# Patient Record
Sex: FEMALE | Race: WHITE | NOT HISPANIC OR LATINO | Employment: FULL TIME | URBAN - METROPOLITAN AREA
[De-identification: names, ages, dates, MRNs, and addresses within clinical notes are randomized per-mention and may not be internally consistent; named-entity substitution may affect disease eponyms.]

---

## 2017-01-04 ENCOUNTER — ROUTINE PRENATAL (OUTPATIENT)
Dept: OBGYN | Facility: CLINIC | Age: 32
End: 2017-01-04
Payer: COMMERCIAL

## 2017-01-04 VITALS — SYSTOLIC BLOOD PRESSURE: 106 MMHG | DIASTOLIC BLOOD PRESSURE: 66 MMHG | BODY MASS INDEX: 19.9 KG/M2 | WEIGHT: 116 LBS

## 2017-01-04 DIAGNOSIS — Z34.82 PRENATAL CARE, SUBSEQUENT PREGNANCY, SECOND TRIMESTER: ICD-10-CM

## 2017-01-04 PROCEDURE — 90040 PR PRENATAL FOLLOW UP: CPT | Performed by: OBSTETRICS & GYNECOLOGY

## 2017-01-04 NOTE — MR AVS SNAPSHOT
Rhea Baum   2017 3:45 PM   Routine Prenatal   MRN: [id number]    Department:  [facility]   Dept Phone:  [phone]    Description:  Female : 1985   Provider:  Joelle Stallings M.D.           Allergies as of 2017     No Known Allergies      You were diagnosed with     Prenatal care, subsequent pregnancy, second trimester   [658707]         Vital Signs     Blood Pressure Weight Last Menstrual Period Smoking Status          106/66 mmHg 52.617 kg (116 lb) 2016 Former Smoker        Basic Information     Date Of Birth Sex Race Ethnicity Preferred Language    1985 Female White Non- English      Your appointments     2017 10:30 AM   OB Follow Up with Joelle Stallings M.D.   [facility] ([address])    [address], [address]  [place] [address] [phone]            Mar 23, 2017  8:00 AM   Follow Up Visit with Neeraj Greer M.D.   [facility] (--)    [address], [address]  [place] [address] [phone]           You will be receiving a confirmation call a few days before your appointment from our automated call confirmation system.            May 30, 2017  3:40 PM   FOLLOW UP with Olaf Umana M.D.   [facility] (--)    [address]  [place] [address] [phone]              Problem List              ICD-10-CM Priority Class Noted - Resolved    Multiple sclerosis affecting pregnancy (HCC) O99.350, G35   2016 - Present    PSVT (paroxysmal supraventricular tachycardia) (HCC) I47.1   2016 - Present    Constipation due to neurogenic bowel K59.00   2016 - Present      Health Maintenance        Date Due Completion Dates    IMM DTaP/Tdap/Td Vaccine (1 - Tdap) 2005 ---    PAP SMEAR 2019            Current Immunizations     Influenza Vaccine Quad Inj (Pf) 10/6/2016  7:53 AM      Below and/or attached are the  medications your provider expects you to take. Review all of your home medications and newly ordered medications with your provider and/or pharmacist. Follow medication instructions as directed by your provider and/or pharmacist. Please keep your medication list with you and share with your provider. Update the information when medications are discontinued, doses are changed, or new medications (including over-the-counter products) are added; and carry medication information at all times in the event of emergency situations     Allergies:  No Known Allergies          Medications  Valid as of: January 04, 2017 -  3:47 PM    Generic Name Brand Name Tablet Size Instructions for use    Corn Dextrin   Take  by mouth.        Polyethylene Glycol 3350   Take  by mouth.        Prenatal Vit-Fe Fumarate-FA   Take  by mouth.        Probiotic Product   Take  by mouth.        .                 Medicines prescribed today were sent to:     [facility]/PHARMACY #[id number] - [place], [place] - [address] AT [address] [address]    Phone: [phone] Fax: [phone]    Open 24 Hours?: No      Medication refill instructions:       If your prescription bottle indicates you have medication refills left, it is not necessary to call your provider’s office. Please contact your pharmacy and they will refill your medication.    If your prescription bottle indicates you do not have any refills left, you may request refills at any time through one of the following ways: The online [employer] system (except Urgent Care), by calling your provider’s office, or by asking your pharmacy to contact your provider’s office with a refill request. Medication refills are processed only during regular business hours and may not be available until the next business day. Your provider may request additional information or to have a follow-up visit with you prior to refilling your medication.   *Please Note: Medication refills  are assigned a new Rx number when refilled electronically. Your pharmacy may indicate that no refills were authorized even though a new prescription for the same medication is available at the pharmacy. Please request the medicine by name with the pharmacy before contacting your provider for a refill.        Your To Do List     Future Labs/Procedures Complete By Expires    GLUCOSE 1HR GESTATIONAL  As directed 1/4/2018    HCT  As directed 1/5/2018    HGB  As directed 1/5/2018    T.PALLIDUM AB EIA  As directed 1/5/2018         MyChart Access Code: Activation code not generated  Current [employer] Status: Active

## 2017-01-05 NOTE — PROGRESS NOTES
31 y.o.  24w0d The patient is here for routine obstetrical followup. She is without complaints and reports good fetal movement. She denies contractions, vaginal bleeding, or leaking of fluid.  MS is stable, no flares.  Constipation doing well on Miralax.  Seen by cardiology and holter showed sinus tach, no PSVT, no meds required.     The patient's pregnancy is complicated by   Patient Active Problem List    Diagnosis Date Noted   • Multiple sclerosis affecting pregnancy ([facility]) 2016   • PSVT (paroxysmal supraventricular tachycardia) ([facility]) 2016   • Constipation due to neurogenic bowel 2016     Has f/u with Dr. Lu scheduled in March.    28 wk labs ordered.     Followup in 4 weeks   labor precautions were discussed with patient  Fetal kick counts were discussed with patient

## 2017-01-09 ENCOUNTER — TELEPHONE (OUTPATIENT)
Dept: OBGYN | Facility: CLINIC | Age: 32
End: 2017-01-09

## 2017-01-09 NOTE — TELEPHONE ENCOUNTER
Called pt back   C/o a cold   Advised pt that she can take tylenol, dimetapp, mucinex, robitussin   Pt verbalized understanding

## 2017-01-21 ENCOUNTER — HOSPITAL ENCOUNTER (OUTPATIENT)
Dept: LAB | Facility: MEDICAL CENTER | Age: 32
End: 2017-01-21
Attending: OBSTETRICS & GYNECOLOGY
Payer: COMMERCIAL

## 2017-01-21 DIAGNOSIS — Z34.82 PRENATAL CARE, SUBSEQUENT PREGNANCY, SECOND TRIMESTER: ICD-10-CM

## 2017-01-21 LAB
GLUCOSE 1H P 50 G GLC PO SERPL-MCNC: 135 MG/DL (ref 70–139)
HCT VFR BLD AUTO: 31.3 % (ref 37–47)
HGB BLD-MCNC: 10.1 G/DL (ref 12–16)
TREPONEMA PALLIDUM IGG+IGM AB [PRESENCE] IN SERUM OR PLASMA BY IMMUNOASSAY: NON REACTIVE

## 2017-01-21 PROCEDURE — 36415 COLL VENOUS BLD VENIPUNCTURE: CPT

## 2017-01-21 PROCEDURE — 85018 HEMOGLOBIN: CPT

## 2017-01-21 PROCEDURE — 86780 TREPONEMA PALLIDUM: CPT

## 2017-01-21 PROCEDURE — 85014 HEMATOCRIT: CPT

## 2017-01-21 PROCEDURE — 82950 GLUCOSE TEST: CPT

## 2017-02-06 ENCOUNTER — ROUTINE PRENATAL (OUTPATIENT)
Dept: OBGYN | Facility: CLINIC | Age: 32
End: 2017-02-06
Payer: COMMERCIAL

## 2017-02-06 VITALS — BODY MASS INDEX: 20.42 KG/M2 | DIASTOLIC BLOOD PRESSURE: 60 MMHG | WEIGHT: 119 LBS | SYSTOLIC BLOOD PRESSURE: 90 MMHG

## 2017-02-06 DIAGNOSIS — O99.350 MULTIPLE SCLEROSIS AFFECTING PREGNANCY (HCC): ICD-10-CM

## 2017-02-06 DIAGNOSIS — G35 MULTIPLE SCLEROSIS AFFECTING PREGNANCY (HCC): ICD-10-CM

## 2017-02-06 DIAGNOSIS — K59.00 CONSTIPATION DUE TO NEUROGENIC BOWEL: ICD-10-CM

## 2017-02-06 DIAGNOSIS — I47.10 PSVT (PAROXYSMAL SUPRAVENTRICULAR TACHYCARDIA): ICD-10-CM

## 2017-02-06 DIAGNOSIS — K59.2 CONSTIPATION DUE TO NEUROGENIC BOWEL: ICD-10-CM

## 2017-02-06 PROCEDURE — 90040 PR PRENATAL FOLLOW UP: CPT | Performed by: OBSTETRICS & GYNECOLOGY

## 2017-02-06 NOTE — Clinical Note
"Count Your Baby's Movements  Another step to a healthy delivery              Dept: [phone]    How Many Weeks Pregnant? 28    Date to Begin Countin17              How to use this chart    One way for your physician to keep track of your baby's health is by knowing how often the baby moves (or \"kicks\") in your womb.  You can help your physician to do this by using this chart every day.    Every day, you should see how many hours it takes for your baby to move 10 times.  Start in the morning, as soon as you get up.    · First, write down the time your baby moves until you get to 10.  · Check off one box every time your baby moves until you get to 10.  · Write down the time you finished counting in the last column.  · Total how long it took to count up all 10 movements.  · Finally, fill in the box that shows how long this took.  After counting 10 movements, you no longer have to count any more that day.  The next morning, just start counting again as soon as you get up.    What should you call a \"movement\"?  It is hard to say, because it will feel different from one mother to another and from one pregnancy to the next.  The important thing is that you count the movements the same way throughout your pregnancy.  If you have more questions, you should ask your physician.    Count carefully every day!  SAMPLE:  Week 28    How many hours did it take to feel 10 movements?       Start  Time     1     2     3     4     5     6     7     8     9     10   Finish Time   Mon 8:20 ·  ·  ·  ·  ·  ·  ·  ·  ·  ·  11:40                  Sat               Sun                 IMPORTANT: You should contact your physician if it takes more than two hours for you to feel 10 movements.  Each morning, write down the time and start to count the movements of your baby.  Keep track by checking off one box every time you feel one movement.  When you have felt 10 \"kicks\", write " down the time you finished counting in the last column.  Then fill in the   box (over the check aura) for the number of hours it took.  Be sure to read the complete instructions on the previous page.

## 2017-02-06 NOTE — MR AVS SNAPSHOT
Rhea Bautista   2017 3:45 PM   Routine Prenatal   MRN: [id number]    [facility]  [facility]   Dept Phone:  [phone]    Description:  Female : 1985   Provider:  Joelle Stallings M.D.           Allergies as of 2017     No Known Allergies      Vital Signs     Blood Pressure Weight Last Menstrual Period Smoking Status          90/60 mmHg 53.978 kg (119 lb) 2016 Former Smoker        Basic Information     Date Of Birth Sex Race Ethnicity Preferred Language    1985 Female White Non- English      Your appointments     Mar 09, 2017  3:00 PM   OB Follow Up with Joelle Stallings M.D.   [facility] ([address])    [address]  [place] [address] [phone]            Mar 23, 2017  8:00 AM   Follow Up Visit with Neeraj Greer M.D.   [facility] Neu[facility] (--)    [address], [address]  [place] [address] [phone]           You will be receiving a confirmation call a few days before your appointment from our automated call confirmation system.            May 30, 2017  3:40 PM   FOLLOW UP with Olaf Umana M.D.   [facility] (--)    [address]  [place] [address] [phone]              Problem List              ICD-10-CM Priority Class Noted - Resolved    Multiple sclerosis affecting pregnancy (CMS-HCC) O99.350, G35   2016 - Present    PSVT (paroxysmal supraventricular tachycardia) (CMS-HCC) I47.1   2016 - Present    Constipation due to neurogenic bowel K59.00   2016 - Present      Health Maintenance        Date Due Completion Dates    IMM DTaP/Tdap/Td Vaccine (1 - Tdap) 2005 ---    PAP SMEAR 2019            Current Immunizations     Influenza Vaccine Quad Inj (Pf) 10/6/2016  7:53 AM      Below and/or attached are the medications your provider expects you to take. Review all of your home medications and newly ordered  medications with your provider and/or pharmacist. Follow medication instructions as directed by your provider and/or pharmacist. Please keep your medication list with you and share with your provider. Update the information when medications are discontinued, doses are changed, or new medications (including over-the-counter products) are added; and carry medication information at all times in the event of emergency situations     Allergies:  No Known Allergies          Medications  Valid as of: February 06, 2017 -  4:28 PM    Generic Name Brand Name Tablet Size Instructions for use    Corn Dextrin   Take  by mouth.        Polyethylene Glycol 3350   Take  by mouth.        Prenatal Vit-Fe Fumarate-FA   Take  by mouth.        Probiotic Product   Take  by mouth.        .                 Medicines prescribed today were sent to:     [facility]/PHARMACY #[id number] - [place], [place] - [address] AT [address] [address]    Phone: [phone] Fax: [phone]    Open 24 Hours?: No      Medication refill instructions:       If your prescription bottle indicates you have medication refills left, it is not necessary to call your provider’s office. Please contact your pharmacy and they will refill your medication.    If your prescription bottle indicates you do not have any refills left, you may request refills at any time through one of the following ways: The online [employer] system (except Urgent Care), by calling your provider’s office, or by asking your pharmacy to contact your provider’s office with a refill request. Medication refills are processed only during regular business hours and may not be available until the next business day. Your provider may request additional information or to have a follow-up visit with you prior to refilling your medication.   *Please Note: Medication refills are assigned a new Rx number when refilled electronically. Your pharmacy may indicate that no refills  were authorized even though a new prescription for the same medication is available at the pharmacy. Please request the medicine by name with the pharmacy before contacting your provider for a refill.           MyChart Access Code: Activation code not generated  Current [employer] Status: Active

## 2017-02-07 NOTE — PROGRESS NOTES
31 y.o.  28w5d The patient is here for routine obstetrical followup. She is without complaints and reports good fetal movement. She denies contractions, vaginal bleeding, or leaking of fluid.  MS stable.  Constipation well controlled. Minimal tachycardia symptoms.     The patient's pregnancy is complicated by   Patient Active Problem List    Diagnosis Date Noted   • Multiple sclerosis affecting pregnancy (CMS-HCC) 2016   • PSVT (paroxysmal supraventricular tachycardia) (CMS-HCC) 2016   • Constipation due to neurogenic bowel 2016     S<<D.  Will call Dr. Lu's office to move up growth scan (scheduled 3/7).  Minimal weight gain - reviewed diet and iron intake.   28 wk labs reviewed - normal.          Followup in 2 weeks   labor precautions were discussed with patient  Fetal kick counts were discussed with patient

## 2017-02-07 NOTE — PROGRESS NOTES
Pt here for OB F/V. Good fetal movement. Denies LOF, VB.  28 week labs done  Info given on tdap  Kick count sheet given

## 2017-03-13 ENCOUNTER — APPOINTMENT (OUTPATIENT)
Dept: OTHER | Facility: IMAGING CENTER | Age: 32
End: 2017-03-13

## 2017-03-23 ENCOUNTER — HOSPITAL ENCOUNTER (OUTPATIENT)
Facility: MEDICAL CENTER | Age: 32
End: 2017-03-23
Attending: OBSTETRICS & GYNECOLOGY
Payer: COMMERCIAL

## 2017-03-23 ENCOUNTER — ROUTINE PRENATAL (OUTPATIENT)
Dept: OBGYN | Facility: CLINIC | Age: 32
End: 2017-03-23
Payer: COMMERCIAL

## 2017-03-23 ENCOUNTER — APPOINTMENT (OUTPATIENT)
Dept: RADIOLOGY | Facility: MEDICAL CENTER | Age: 32
End: 2017-03-23
Attending: OBSTETRICS & GYNECOLOGY
Payer: COMMERCIAL

## 2017-03-23 ENCOUNTER — HOSPITAL ENCOUNTER (OUTPATIENT)
Facility: MEDICAL CENTER | Age: 32
End: 2017-03-23
Attending: OBSTETRICS & GYNECOLOGY | Admitting: OBSTETRICS & GYNECOLOGY
Payer: COMMERCIAL

## 2017-03-23 VITALS — DIASTOLIC BLOOD PRESSURE: 66 MMHG | WEIGHT: 121 LBS | BODY MASS INDEX: 20.76 KG/M2 | SYSTOLIC BLOOD PRESSURE: 98 MMHG

## 2017-03-23 VITALS
RESPIRATION RATE: 16 BRPM | TEMPERATURE: 98 F | DIASTOLIC BLOOD PRESSURE: 69 MMHG | SYSTOLIC BLOOD PRESSURE: 112 MMHG | BODY MASS INDEX: 20.76 KG/M2 | WEIGHT: 121 LBS | HEART RATE: 71 BPM

## 2017-03-23 DIAGNOSIS — G35 MULTIPLE SCLEROSIS AFFECTING PREGNANCY (HCC): ICD-10-CM

## 2017-03-23 DIAGNOSIS — O26.843 UTERINE SIZE DATE DISCREPANCY PREGNANCY, THIRD TRIMESTER: ICD-10-CM

## 2017-03-23 DIAGNOSIS — O99.350 MULTIPLE SCLEROSIS AFFECTING PREGNANCY (HCC): ICD-10-CM

## 2017-03-23 LAB
NST ACOUSTIC STIMULATION: ABNORMAL
NST ACTION NECESSARY: ABNORMAL
NST ASSESSMENT: ABNORMAL
NST BASELINE: ABNORMAL
NST INDICATIONS: ABNORMAL
NST OTHER DATA: ABNORMAL
NST READ BY: ABNORMAL
NST RETURN: ABNORMAL
NST UTERINE ACTIVITY: ABNORMAL

## 2017-03-23 PROCEDURE — 59025 FETAL NON-STRESS TEST: CPT | Performed by: OBSTETRICS & GYNECOLOGY

## 2017-03-23 PROCEDURE — 87653 STREP B DNA AMP PROBE: CPT

## 2017-03-23 PROCEDURE — 76805 OB US >/= 14 WKS SNGL FETUS: CPT

## 2017-03-23 PROCEDURE — 76819 FETAL BIOPHYS PROFIL W/O NST: CPT

## 2017-03-23 PROCEDURE — 90040 PR PRENATAL FOLLOW UP: CPT | Performed by: OBSTETRICS & GYNECOLOGY

## 2017-03-23 NOTE — MR AVS SNAPSHOT
Rhea Baum   3/23/2017 4:00 PM   Routine Prenatal   MRN: [id number]    [facility]  [facility]   Dept Phone:  [phone]    Description:  Female : 1985   Provider:  ASHLIE CABRALES ROOM           Allergies as of 3/23/2017     No Known Allergies      You were diagnosed with     Uterine size date discrepancy pregnancy, third trimester   [694163]         Vital Signs     Last Menstrual Period Smoking Status                2016 Former Smoker          Basic Information     Date Of Birth Sex Race Ethnicity Preferred Language    1985 Female White Non- English      Your appointments     Mar 29, 2017 10:00 AM   OB Follow Up with Joelle Stallings M.D.   [facility] ([address])    [address] [place] [address], [address]  [facility] [address] [phone]            2017  9:00 AM   OB Follow Up with Joelle Stallings M.D.   [facility] ([address])    [address]  [place] [address] [phone]            2017  9:00 AM   OB Follow Up with Joelle Stallings M.D.   [facility] ([address])    [address]  [place] [address] [phone]            2017  9:00 AM   OB Follow Up with Joelle Stallings M.D.   [facility] ([address])    [address] [place] [address], [address]  [place] [address] [phone]            May 30, 2017  3:40 PM   FOLLOW UP with Olaf Umana M.D.   [facility] (--)    [address]  [place] [address] [phone]              Problem List              ICD-10-CM Priority Class Noted - Resolved    Multiple sclerosis affecting pregnancy (CMS-HCC) O99.350, G35   2016 - Present    PSVT (paroxysmal supraventricular tachycardia) (CMS-HCC) I47.1   2016 - Present    Constipation due to neurogenic bowel K59.00   2016 - Present    SGA (small for gestational age) P05.00    3/23/2017 - Present      Health Maintenance        Date Due Completion Dates    IMM DTaP/Tdap/Td Vaccine (1 - Tdap) 11/17/2005 ---    PAP SMEAR 11/4/2019 11/4/2016            Results       Current Immunizations     Influenza Vaccine Quad Inj (Pf) 10/6/2016  7:53 AM      Below and/or attached are the medications your provider expects you to take. Review all of your home medications and newly ordered medications with your provider and/or pharmacist. Follow medication instructions as directed by your provider and/or pharmacist. Please keep your medication list with you and share with your provider. Update the information when medications are discontinued, doses are changed, or new medications (including over-the-counter products) are added; and carry medication information at all times in the event of emergency situations     Allergies:  No Known Allergies          Medications  Valid as of: March 23, 2017 -  4:39 PM    Generic Name Brand Name Tablet Size Instructions for use    Corn Dextrin   Take  by mouth.        Polyethylene Glycol 3350   Take  by mouth.        Prenatal Vit-Fe Fumarate-FA   Take  by mouth.        Probiotic Product   Take  by mouth.        .                 Medicines prescribed today were sent to:     [facility]/PHARMACY #[id number] - [place], [place] - [address] [place] [address] AT [address] [address]    Phone: [phone] Fax: [phone]    Open 24 Hours?: No      Medication refill instructions:       If your prescription bottle indicates you have medication refills left, it is not necessary to call your provider’s office. Please contact your pharmacy and they will refill your medication.    If your prescription bottle indicates you do not have any refills left, you may request refills at any time through one of the following ways: The online [employer] system (except Urgent Care), by calling your provider’s office, or by asking your pharmacy to contact your provider’s office with  a refill request. Medication refills are processed only during regular business hours and may not be available until the next business day. Your provider may request additional information or to have a follow-up visit with you prior to refilling your medication.   *Please Note: Medication refills are assigned a new Rx number when refilled electronically. Your pharmacy may indicate that no refills were authorized even though a new prescription for the same medication is available at the pharmacy. Please request the medicine by name with the pharmacy before contacting your provider for a refill.           [employer] Access Code: Activation code not generated  Current [employer] Status: Active

## 2017-03-23 NOTE — PROGRESS NOTES
31 y.o.  35w1d The patient is here for routine obstetrical followup. She reports good fetal movement. She denies contractions, vaginal bleeding, or leaking of fluid.  She missed several appointments due to not having a ride.  She reports intermittent abdominal pains, which is usual for her MS.  No constipation.  No cardiac symptoms.    The patient's pregnancy is complicated by   Patient Active Problem List    Diagnosis Date Noted   • Multiple sclerosis affecting pregnancy (CMS-HCC) 2016   • PSVT (paroxysmal supraventricular tachycardia) (CMS-HCC) 2016   • Constipation due to neurogenic bowel 2016      Weight gain only 12 lbs (2 lbs in last 7 wks).      Limited US performed for PAULIE.  PAULIE = 11.1 cm  NST today.   Has F/U US scheduled with Dr. Lu in 2 weeks.  GBS done.    NST today  [place] - intermittent contractions  EFM - 120s, moderate variability, not reactive despite VAS.    To L&D for BPP, growth scan, and dopplers.

## 2017-03-23 NOTE — MR AVS SNAPSHOT
Rhea Baum   3/23/2017 3:30 PM   Routine Prenatal   MRN: [id number]    [facility]  [facility]   Dept Phone:  [phone]    Description:  Female : 1985   Provider:  Joelle Stallings M.D.           Allergies as of 3/23/2017     No Known Allergies      You were diagnosed with     Multiple sclerosis affecting pregnancy (CMS-HCC)   [3818756]       SGA (small for gestational age)   [670308]         Vital Signs     Blood Pressure Weight Last Menstrual Period Smoking Status          98/66 mmHg 54.885 kg (121 lb) 2016 Former Smoker        Basic Information     Date Of Birth Sex Race Ethnicity Preferred Language    1985 Female White Non- English      Your appointments     Mar 29, 2017 10:00 AM   OB Follow Up with Joelle Stallings M.D.   [facility] ([address])    [address] [place] [address], [facility] [address]  [facility] [address] [phone]            2017  9:00 AM   OB Follow Up with Joelle Stallings M.D.   [facility] ([address])    [address] [place] [address], [facility] [address]  [facility] [address] [phone]            2017  9:00 AM   OB Follow Up with Joelle Stallings M.D.   [facility] ([address])    [address] [place] [address], [address]  [facility] [address]   [phone]            2017  9:00 AM   OB Follow Up with Joelle Stallings M.D.   [facility] ([address])    [address] [place] [address], [address]  [facility] [address]   [phone]            May 30, 2017  3:40 PM   FOLLOW UP with Olaf Umana M.D.   [facility] (--)    [address]  [place] [address] [phone]              Problem List              ICD-10-CM Priority Class Noted - Resolved    Multiple sclerosis affecting pregnancy (CMS-HCC) O99.350, G35   2016 - Present    PSVT (paroxysmal supraventricular tachycardia) (CMS-HCC) I47.1   2016 - Present    Constipation due to  neurogenic bowel K59.00   12/1/2016 - Present    SGA (small for gestational age) P05.00   3/23/2017 - Present      Health Maintenance        Date Due Completion Dates    IMM DTaP/Tdap/Td Vaccine (1 - Tdap) 11/17/2005 ---    PAP SMEAR 11/4/2019 11/4/2016            Current Immunizations     Influenza Vaccine Quad Inj (Pf) 10/6/2016  7:53 AM      Below and/or attached are the medications your provider expects you to take. Review all of your home medications and newly ordered medications with your provider and/or pharmacist. Follow medication instructions as directed by your provider and/or pharmacist. Please keep your medication list with you and share with your provider. Update the information when medications are discontinued, doses are changed, or new medications (including over-the-counter products) are added; and carry medication information at all times in the event of emergency situations     Allergies:  No Known Allergies          Medications  Valid as of: March 23, 2017 -  4:40 PM    Generic Name Brand Name Tablet Size Instructions for use    Corn Dextrin   Take  by mouth.        Polyethylene Glycol 3350   Take  by mouth.        Prenatal Vit-Fe Fumarate-FA   Take  by mouth.        Probiotic Product   Take  by mouth.        .                 Medicines prescribed today were sent to:     [facility]/PHARMACY #[id number] - [place], [place] - [address] [place] [address]    Phone: [phone] Fax: [phone]    Open 24 Hours?: No      Medication refill instructions:       If your prescription bottle indicates you have medication refills left, it is not necessary to call your provider’s office. Please contact your pharmacy and they will refill your medication.    If your prescription bottle indicates you do not have any refills left, you may request refills at any time through one of the following ways: The online [employer] system (except Urgent Care), by calling your  provider’s office, or by asking your pharmacy to contact your provider’s office with a refill request. Medication refills are processed only during regular business hours and may not be available until the next business day. Your provider may request additional information or to have a follow-up visit with you prior to refilling your medication.   *Please Note: Medication refills are assigned a new Rx number when refilled electronically. Your pharmacy may indicate that no refills were authorized even though a new prescription for the same medication is available at the pharmacy. Please request the medicine by name with the pharmacy before contacting your provider for a refill.        Your To Do List     Future Labs/Procedures Complete By Expires    GRP B STREP, BY PCR (MEYER BROTH)  As directed 3/24/2018         [employer] Access Code: Activation code not generated  Current [employer] Status: Active

## 2017-03-24 NOTE — PROGRESS NOTES
1850 Report received from Jovanna CORONA, POC discussed, will updated Dr. Jesus on pt status ASAP.    1918 Dr. Ruiz updated on pt status, test results reviewed. Orders to discharge pt home with instructions to follow up with Dr. Stallings at [facility] for biweekly NST.     1930 Pt discharged home in stable condition, all questions answered, pt verbalized understanding and will follow up with Dr. Stallings for NST.

## 2017-03-24 NOTE — PROGRESS NOTES
EDC- , EGA- 35.1    1715- Pt arrived to L&D from Dr. Stallings's office for non-reactive tracing.  Mak for BPP, PAULIE, and dopplers received.  Pt denies LOF, VB, UC's, reports +FM.  1745- Ultrasound at bs.  1850- Report to CHLOE Salcido.

## 2017-03-25 LAB — GP B STREP DNA SPEC QL NAA+PROBE: NEGATIVE

## 2017-03-29 ENCOUNTER — ROUTINE PRENATAL (OUTPATIENT)
Dept: OBGYN | Facility: CLINIC | Age: 32
End: 2017-03-29
Payer: COMMERCIAL

## 2017-03-29 VITALS — SYSTOLIC BLOOD PRESSURE: 100 MMHG | BODY MASS INDEX: 20.76 KG/M2 | DIASTOLIC BLOOD PRESSURE: 64 MMHG | WEIGHT: 121 LBS

## 2017-03-29 DIAGNOSIS — G35 MULTIPLE SCLEROSIS AFFECTING PREGNANCY (HCC): ICD-10-CM

## 2017-03-29 DIAGNOSIS — O26.843 UTERINE SIZE DATE DISCREPANCY PREGNANCY, THIRD TRIMESTER: ICD-10-CM

## 2017-03-29 DIAGNOSIS — O99.350 MULTIPLE SCLEROSIS AFFECTING PREGNANCY (HCC): ICD-10-CM

## 2017-03-29 LAB
NST ACOUSTIC STIMULATION: NORMAL
NST ACTION NECESSARY: NORMAL
NST ASSESSMENT: NORMAL
NST BASELINE: NORMAL
NST INDICATIONS: NORMAL
NST OTHER DATA: NORMAL
NST READ BY: NORMAL
NST RETURN: NORMAL
NST UTERINE ACTIVITY: NORMAL

## 2017-03-29 PROCEDURE — 59025 FETAL NON-STRESS TEST: CPT | Performed by: OBSTETRICS & GYNECOLOGY

## 2017-03-29 PROCEDURE — 90040 PR PRENATAL FOLLOW UP: CPT | Performed by: OBSTETRICS & GYNECOLOGY

## 2017-03-29 NOTE — MR AVS SNAPSHOT
Rhea Baum   3/29/2017 10:30 AM   Routine Prenatal   MRN: [id number]    Department:  [facility]   Dept Phone:  [phone]    Description:  Female : 1985   Provider:  ASHLIE VANG NST ROOM           Allergies as of 3/29/2017     No Known Allergies      You were diagnosed with     Uterine size date discrepancy pregnancy, third trimester   [497921]         Vital Signs     Last Menstrual Period Smoking Status                2016 Former Smoker          Basic Information     Date Of Birth Sex Race Ethnicity Preferred Language    1985 Female White Non- English      Your appointments     2017  8:45 AM   Fetal Non-Stress Test with ASHLIE VANG NST ROOM   [facility] ([address])    [address] [place] [address], [address]  [place] [address] [phone]            2017  9:00 AM   OB Follow Up with Joelle Stallings M.D.   [facility] ([address])    [address], [address]  [place] [address] [phone]            2017  9:00 AM   OB Follow Up with Joelle Stallings M.D.   [facility] ([address])    [address], [address]  [place] [address] [phone]            2017  9:00 AM   OB Follow Up with Joelle Stallings M.D.   [facility] ([address])    [address] [place] [address], [address]  [place] [address] [phone]            May 30, 2017  3:40 PM   FOLLOW UP with Olaf Umana M.D.   [facility] (--)    [address]  [place] [address] [phone]              Problem List              ICD-10-CM Priority Class Noted - Resolved    Multiple sclerosis affecting pregnancy (CMS-HCC) O99.350, G35   2016 - Present    PSVT (paroxysmal supraventricular tachycardia) (CMS-HCC) I47.1   2016 - Present    Constipation due to neurogenic bowel K59.00   2016 - Present    SGA (small for gestational age)  P05.00   3/23/2017 - Present      Health Maintenance        Date Due Completion Dates    IMM DTaP/Tdap/Td Vaccine (1 - Tdap) 11/17/2005 ---    PAP SMEAR 11/4/2019 11/4/2016            Results       Current Immunizations     Influenza Vaccine Quad Inj (Pf) 10/6/2016  7:53 AM      Below and/or attached are the medications your provider expects you to take. Review all of your home medications and newly ordered medications with your provider and/or pharmacist. Follow medication instructions as directed by your provider and/or pharmacist. Please keep your medication list with you and share with your provider. Update the information when medications are discontinued, doses are changed, or new medications (including over-the-counter products) are added; and carry medication information at all times in the event of emergency situations     Allergies:  No Known Allergies          Medications  Valid as of: March 29, 2017 - 11:15 AM    Generic Name Brand Name Tablet Size Instructions for use    Corn Dextrin   Take  by mouth.        Polyethylene Glycol 3350   Take  by mouth.        Prenatal Vit-Fe Fumarate-FA   Take  by mouth.        Probiotic Product   Take  by mouth.        .                 Medicines prescribed today were sent to:     [facility]/PHARMACY #[id number] - [place], [place] - [address] [address]    Phone: [phone] Fax: [phone]    Open 24 Hours?: No      Medication refill instructions:       If your prescription bottle indicates you have medication refills left, it is not necessary to call your provider’s office. Please contact your pharmacy and they will refill your medication.    If your prescription bottle indicates you do not have any refills left, you may request refills at any time through one of the following ways: The online [employer] system (except Urgent Care), by calling your provider’s office, or by asking your pharmacy to contact your provider’s  office with a refill request. Medication refills are processed only during regular business hours and may not be available until the next business day. Your provider may request additional information or to have a follow-up visit with you prior to refilling your medication.   *Please Note: Medication refills are assigned a new Rx number when refilled electronically. Your pharmacy may indicate that no refills were authorized even though a new prescription for the same medication is available at the pharmacy. Please request the medicine by name with the pharmacy before contacting your provider for a refill.           [employer] Access Code: Activation code not generated  Current [employer] Status: Active

## 2017-03-29 NOTE — PROGRESS NOTES
31 y.o.  36w0d The patient is here for routine obstetrical followup. She is without complaints and reports good fetal movement. She denies contractions, vaginal bleeding, or leaking of fluid.  No constipation. MS stable.  No cardiac symptoms.     The patient's pregnancy is complicated by   Patient Active Problem List    Diagnosis Date Noted   • SGA (small for gestational age) 2017   • Multiple sclerosis affecting pregnancy (CMS-HCC) 2016   • PSVT (paroxysmal supraventricular tachycardia) (CMS-HCC) 2016   • Constipation due to neurogenic bowel 2016     Seen on L&D for US growth, BPP, dopplers.  Size 19 days less than SHANNAN. Fluid, dopplers, BPP reassuring.    NST reactive today.  Continue with twice weekly NSTs.   Pt states she has f/u with Dr. Lu on .  Requested appointment be moved earlier, as I am concerned about the growth.  Will place order for IOL at or near 39 wks.     Followup in 1 week   labor precautions were discussed with patient  Fetal kick counts were discussed with patient

## 2017-03-29 NOTE — MR AVS SNAPSHOT
Rhea Baum   3/29/2017 10:00 AM   Routine Prenatal   MRN: [id number]    [facility]   Dept Phone:  [phone]    Description:  Female : 1985   Provider:  Joelle Stallings M.D.           Allergies as of 3/29/2017     No Known Allergies      You were diagnosed with     Multiple sclerosis affecting pregnancy (CMS-HCC)   [0470976]         Vital Signs     Blood Pressure Weight Last Menstrual Period Smoking Status          100/64 mmHg 54.885 kg (121 lb) 2016 Former Smoker        Basic Information     Date Of Birth Sex Race Ethnicity Preferred Language    1985 Female White Non- English      Your appointments     2017  8:45 AM   Fetal Non-Stress Test with OBGYN E2 NST ROOM   [facility] ([address])    [address] [place] [address], [facility] [address]  [facility] [address] [phone]            2017  9:00 AM   OB Follow Up with Joelle Stallings M.D.   [facility] ([address])    [address] [place] [address], [address]  [facility] [address] [phone]            2017  9:00 AM   OB Follow Up with Joelle Stallings M.D.   [facility] ([address])    [address] [place] [address], [address]  [facility] [address] [phone]            2017  9:00 AM   OB Follow Up with Joelle Stallings M.D.   [facility] ([address])    [address] [place] [address], [address]  [facility] [address] [phone]            May 30, 2017  3:40 PM   FOLLOW UP with Olaf Umana M.D.   [facility] (--)    [address] [place] [address] [address]  [place] [address] [phone]              Problem List              ICD-10-CM Priority Class Noted - Resolved    Multiple sclerosis affecting pregnancy (CMS-HCC) O99.350, G35   2016 - Present    PSVT (paroxysmal supraventricular tachycardia) (CMS-[facility]) I47.1   2016 - Present    Constipation due to neurogenic bowel K59.00   2016 -  Present    SGA (small for gestational age) P05.00   3/23/2017 - Present      Health Maintenance        Date Due Completion Dates    IMM DTaP/Tdap/Td Vaccine (1 - Tdap) 11/17/2005 ---    PAP SMEAR 11/4/2019 11/4/2016            Current Immunizations     Influenza Vaccine Quad Inj (Pf) 10/6/2016  7:53 AM      Below and/or attached are the medications your provider expects you to take. Review all of your home medications and newly ordered medications with your provider and/or pharmacist. Follow medication instructions as directed by your provider and/or pharmacist. Please keep your medication list with you and share with your provider. Update the information when medications are discontinued, doses are changed, or new medications (including over-the-counter products) are added; and carry medication information at all times in the event of emergency situations     Allergies:  No Known Allergies          Medications  Valid as of: March 29, 2017 - 11:18 AM    Generic Name Brand Name Tablet Size Instructions for use    Corn Dextrin   Take  by mouth.        Polyethylene Glycol 3350   Take  by mouth.        Prenatal Vit-Fe Fumarate-FA   Take  by mouth.        Probiotic Product   Take  by mouth.        .                 Medicines prescribed today were sent to:     [facility]/PHARMACY #[id number] [place], [place] - [address] [place] [address] AT [address] [address]    Phone: [phone] Fax: [phone]    Open 24 Hours?: No      Medication refill instructions:       If your prescription bottle indicates you have medication refills left, it is not necessary to call your provider’s office. Please contact your pharmacy and they will refill your medication.    If your prescription bottle indicates you do not have any refills left, you may request refills at any time through one of the following ways: The online [employer] system (except Urgent Care), by calling your provider’s office, or by asking your pharmacy  to contact your provider’s office with a refill request. Medication refills are processed only during regular business hours and may not be available until the next business day. Your provider may request additional information or to have a follow-up visit with you prior to refilling your medication.   *Please Note: Medication refills are assigned a new Rx number when refilled electronically. Your pharmacy may indicate that no refills were authorized even though a new prescription for the same medication is available at the pharmacy. Please request the medicine by name with the pharmacy before contacting your provider for a refill.           [employer] Access Code: Activation code not generated  Current [employer] Status: Active

## 2017-04-04 ENCOUNTER — ROUTINE PRENATAL (OUTPATIENT)
Dept: OBGYN | Facility: CLINIC | Age: 32
End: 2017-04-04
Payer: COMMERCIAL

## 2017-04-04 DIAGNOSIS — O28.8 NST (NON-STRESS TEST) NONREACTIVE: ICD-10-CM

## 2017-04-04 DIAGNOSIS — O26.843 UTERINE SIZE DATE DISCREPANCY PREGNANCY, THIRD TRIMESTER: ICD-10-CM

## 2017-04-04 LAB
NST ACOUSTIC STIMULATION: YES
NST ACTION NECESSARY: ABNORMAL
NST ASSESSMENT: NONREACTIVE
NST BASELINE: 150
NST INDICATIONS: ABNORMAL
NST OTHER DATA: ABNORMAL
NST READ BY: ABNORMAL
NST RETURN: ABNORMAL
NST UTERINE ACTIVITY: ABNORMAL

## 2017-04-04 PROCEDURE — 90040 PR PRENATAL FOLLOW UP: CPT | Performed by: OBSTETRICS & GYNECOLOGY

## 2017-04-04 PROCEDURE — 76815 OB US LIMITED FETUS(S): CPT | Performed by: OBSTETRICS & GYNECOLOGY

## 2017-04-04 NOTE — MR AVS SNAPSHOT
Rhea Baum   2017 8:45 AM   Routine Prenatal   MRN: [id number]    Department:  [facility]   Dept Phone:  [phone]    Description:  Female : 1985   Provider:  ASHLIE VANG NST ROOM           Allergies as of 2017     No Known Allergies      You were diagnosed with     Uterine size date discrepancy pregnancy, third trimester   [769239]         Vital Signs     Last Menstrual Period Smoking Status                2016 Former Smoker          Basic Information     Date Of Birth Sex Race Ethnicity Preferred Language    1985 Female White Non- English      Your appointments     2017  8:45 AM   Fetal Non-Stress Test with ASHLIE VANG NST ROOM   [facility] ([address])    [address] [facility], [address]  [facility] [address] [phone]            2017  9:00 AM   OB Follow Up with Joelle Stallings M.D.   [facility] ([address])    [address] [facility], [address]  [facility] [address] [phone]            2017  9:00 AM   OB Follow Up with Joelle Stallings M.D.   [facility] ([address])    [address] [facility], [address]  [facility] [address] [phone]            2017  8:00 AM   TPC INDUCTION OF LABOR with NON-SURGICAL L&D   LABOR & DELIVERY [facility] (--)    [address] [facility] [address] [phone]            2017  9:00 AM   OB Follow Up with Joelle Stallings M.D.   [facility] ([address])    [address] [facility], [address]  [facility] [address] [phone]            May 30, 2017  3:40 PM   FOLLOW UP with Olaf Umana M.D.   [facility] (--)    [address], [facility] [address]  [facility] [address] [phone]              Problem List              ICD-10-CM Priority Class Noted - Resolved    Multiple sclerosis affecting pregnancy (CMS-HCC) O99.350, G35   2016 - Present    PSVT (paroxysmal  supraventricular tachycardia) (CMS-[facility]) I47.1   12/1/2016 - Present    Constipation due to neurogenic bowel K59.00   12/1/2016 - Present    SGA (small for gestational age) P05.00   3/23/2017 - Present      Health Maintenance        Date Due Completion Dates    IMM DTaP/Tdap/Td Vaccine (1 - Tdap) 11/17/2005 ---    PAP SMEAR 11/4/2019 11/4/2016            Results       Current Immunizations     Influenza Vaccine Quad Inj (Pf) 10/6/2016  7:53 AM      Below and/or attached are the medications your provider expects you to take. Review all of your home medications and newly ordered medications with your provider and/or pharmacist. Follow medication instructions as directed by your provider and/or pharmacist. Please keep your medication list with you and share with your provider. Update the information when medications are discontinued, doses are changed, or new medications (including over-the-counter products) are added; and carry medication information at all times in the event of emergency situations     Allergies:  No Known Allergies          Medications  Valid as of: April 04, 2017 -  9:46 AM    Generic Name Brand Name Tablet Size Instructions for use    Corn Dextrin   Take  by mouth.        Polyethylene Glycol 3350   Take  by mouth.        Prenatal Vit-Fe Fumarate-FA   Take  by mouth.        Probiotic Product   Take  by mouth.        .                 Medicines prescribed today were sent to:     [facility]/PHARMACY #[id number]  [place] - [address] [place] [address]    Phone: [phone] Fax: [phone]    Open 24 Hours?: No      Medication refill instructions:       If your prescription bottle indicates you have medication refills left, it is not necessary to call your provider’s office. Please contact your pharmacy and they will refill your medication.    If your prescription bottle indicates you do not have any refills left, you may request refills at any time  through one of the following ways: The online [employer] system (except Urgent Care), by calling your provider’s office, or by asking your pharmacy to contact your provider’s office with a refill request. Medication refills are processed only during regular business hours and may not be available until the next business day. Your provider may request additional information or to have a follow-up visit with you prior to refilling your medication.   *Please Note: Medication refills are assigned a new Rx number when refilled electronically. Your pharmacy may indicate that no refills were authorized even though a new prescription for the same medication is available at the pharmacy. Please request the medicine by name with the pharmacy before contacting your provider for a refill.           [employer] Access Code: Activation code not generated  Current [employer] Status: Active

## 2017-04-04 NOTE — PROGRESS NOTES
NST nonreactive but no decelerations    Limited OB ultrasound-as performed and read by myself; biophysical profile 8/8 positive for gross fetal movements, positive for fine motor movements, positive for adequate PAULIE, positive for fetal breathing movements, cephalic presentation, grade 2 placenta, PAULIE 12.3

## 2017-04-07 ENCOUNTER — ROUTINE PRENATAL (OUTPATIENT)
Dept: OBGYN | Facility: CLINIC | Age: 32
End: 2017-04-07
Payer: COMMERCIAL

## 2017-04-07 DIAGNOSIS — O36.5930 IUGR (INTRAUTERINE GROWTH RESTRICTION) AFFECTING CARE OF MOTHER, THIRD TRIMESTER, NOT APPLICABLE OR UNSPECIFIED FETUS: ICD-10-CM

## 2017-04-11 ENCOUNTER — ROUTINE PRENATAL (OUTPATIENT)
Dept: OBGYN | Facility: CLINIC | Age: 32
End: 2017-04-11
Payer: COMMERCIAL

## 2017-04-11 VITALS — WEIGHT: 122 LBS | BODY MASS INDEX: 20.93 KG/M2 | SYSTOLIC BLOOD PRESSURE: 90 MMHG | DIASTOLIC BLOOD PRESSURE: 62 MMHG

## 2017-04-11 DIAGNOSIS — G35 MULTIPLE SCLEROSIS AFFECTING PREGNANCY (HCC): ICD-10-CM

## 2017-04-11 DIAGNOSIS — I47.10 PSVT (PAROXYSMAL SUPRAVENTRICULAR TACHYCARDIA): ICD-10-CM

## 2017-04-11 DIAGNOSIS — O99.350 MULTIPLE SCLEROSIS AFFECTING PREGNANCY (HCC): ICD-10-CM

## 2017-04-11 DIAGNOSIS — O36.5990 IUGR, ANTENATAL: ICD-10-CM

## 2017-04-11 DIAGNOSIS — K59.00 CONSTIPATION DUE TO NEUROGENIC BOWEL: ICD-10-CM

## 2017-04-11 DIAGNOSIS — K59.2 CONSTIPATION DUE TO NEUROGENIC BOWEL: ICD-10-CM

## 2017-04-11 LAB
NST ACOUSTIC STIMULATION: NORMAL
NST ACTION NECESSARY: NORMAL
NST ASSESSMENT: NORMAL
NST BASELINE: 130
NST INDICATIONS: NORMAL
NST OTHER DATA: NORMAL
NST READ BY: NORMAL
NST RETURN: NORMAL
NST UTERINE ACTIVITY: NORMAL

## 2017-04-11 PROCEDURE — 90040 PR PRENATAL FOLLOW UP: CPT | Performed by: OBSTETRICS & GYNECOLOGY

## 2017-04-11 NOTE — MR AVS SNAPSHOT
Rhea Baum   2017 9:00 AM   Routine Prenatal   MRN: [id number]    [facility]   Dept Phone:  [phone]    Description:  Female : 1985   Provider:  Joelle Stallings M.D.           Allergies as of 2017     No Known Allergies      You were diagnosed with     Multiple sclerosis affecting pregnancy (CMS-HCC)   [5962226]         Vital Signs     Blood Pressure Weight Last Menstrual Period Smoking Status          90/62 mmHg 55.339 kg (122 lb) 2016 Former Smoker        Basic Information     Date Of Birth Sex Race Ethnicity Preferred Language    1985 Female White Non- English      Your appointments     2017  9:00 AM   OB Follow Up with Joelle Stallings M.D.   [facility] ([address])    [address] [place] [address], [facility] [address]  [facility] [address] [phone]            2017  8:00 AM   TPC INDUCTION OF LABOR with NON-SURGICAL L&D   LABOR & DELIVERY [facility] (--)    [address] [facility] [address]   [phone]            2017  9:00 AM   OB Follow Up with Joelle Stallings M.D.   [facility] ([address])    [address] [place] [address], [facility] [address]  [facility] [address] [phone]            May 30, 2017  3:40 PM   FOLLOW UP with Olaf Umana M.D.   [facility] (--)    [address] [facility], [facility] [address]  [facility] [address] [phone]              Problem List              ICD-10-CM Priority Class Noted - Resolved    Multiple sclerosis affecting pregnancy (CMS-HCC) O99.350, G35   2016 - Present    PSVT (paroxysmal supraventricular tachycardia) (CMS-HCC) I47.1   2016 - Present    Constipation due to neurogenic bowel K59.00   2016 - Present    SGA (small for gestational age) P05.00   3/23/2017 - Present      Health Maintenance        Date Due Completion Dates    IMM DTaP/Tdap/Td Vaccine (1 - Tdap) 2005 ---    PAP SMEAR 2019            Current  Immunizations     Influenza Vaccine Quad Inj (Pf) 10/6/2016  7:53 AM      Below and/or attached are the medications your provider expects you to take. Review all of your home medications and newly ordered medications with your provider and/or pharmacist. Follow medication instructions as directed by your provider and/or pharmacist. Please keep your medication list with you and share with your provider. Update the information when medications are discontinued, doses are changed, or new medications (including over-the-counter products) are added; and carry medication information at all times in the event of emergency situations     Allergies:  No Known Allergies          Medications  Valid as of: April 11, 2017 - 10:03 AM    Generic Name Brand Name Tablet Size Instructions for use    Corn Dextrin   Take  by mouth.        Polyethylene Glycol 3350   Take  by mouth.        Prenatal Vit-Fe Fumarate-FA   Take  by mouth.        Probiotic Product   Take  by mouth.        .                 Medicines prescribed today were sent to:     [facility]/PHARMACY #[id number] - [place], [place] - [address] [facility] AT [address] [address]    Phone: [phone] Fax: [phone]    Open 24 Hours?: No      Medication refill instructions:       If your prescription bottle indicates you have medication refills left, it is not necessary to call your provider’s office. Please contact your pharmacy and they will refill your medication.    If your prescription bottle indicates you do not have any refills left, you may request refills at any time through one of the following ways: The online [employer] system (except Urgent Care), by calling your provider’s office, or by asking your pharmacy to contact your provider’s office with a refill request. Medication refills are processed only during regular business hours and may not be available until the next business day. Your provider may request additional information or to  have a follow-up visit with you prior to refilling your medication.   *Please Note: Medication refills are assigned a new Rx number when refilled electronically. Your pharmacy may indicate that no refills were authorized even though a new prescription for the same medication is available at the pharmacy. Please request the medicine by name with the pharmacy before contacting your provider for a refill.           [employer] Access Code: Activation code not generated  Current [employer] Status: Active

## 2017-04-11 NOTE — PROGRESS NOTES
31 y.o.  37w6d The patient is here for routine obstetrical followup. She reports good fetal movement. She denies contractions, vaginal bleeding, or leaking of fluid.  She is having trouble eating.  Only 1 lb weight gain last week.      The patient's pregnancy is complicated by   Patient Active Problem List    Diagnosis Date Noted   • SGA (small for gestational age) 2017   • Multiple sclerosis affecting pregnancy (CMS-HCC) 2016   • PSVT (paroxysmal supraventricular tachycardia) (CMS-HCC) 2016   • Constipation due to neurogenic bowel 2016     Seen by Dr. Lu, who reports EFW at 2537 grams, 17% tile with slowing of fetal growth.  She recommends IOL after 38 wks. I had originally scheduled Rhea for IOL at 39+1 when I am on next.  Offered keeping date with twice weekly NSTs or moving IOL earlier.  She would like to proceed tomorrow.  We discussed the risks of prolonged labor, increased risk of infection, failed IOL with subsequent C/S.  She understands the risks and desires to proceed.     NST cat 1 today.      Followup tomorrow for IOL at 0800  Labor precautions were discussed with patient  Fetal kick counts were discussed with patient

## 2017-04-11 NOTE — PROGRESS NOTES
Pt here for OB F/V. Good fetal movement. Denies LOF, VB.  Aly 4/3 in media  C sec 4/20/17 8 am  GBS NEG

## 2017-04-12 ENCOUNTER — HOSPITAL ENCOUNTER (INPATIENT)
Facility: MEDICAL CENTER | Age: 32
LOS: 2 days | End: 2017-04-14
Attending: OBSTETRICS & GYNECOLOGY | Admitting: OBSTETRICS & GYNECOLOGY
Payer: COMMERCIAL

## 2017-04-12 ENCOUNTER — APPOINTMENT (OUTPATIENT)
Dept: OBGYN | Facility: MEDICAL CENTER | Age: 32
End: 2017-04-12
Attending: OBSTETRICS & GYNECOLOGY
Payer: COMMERCIAL

## 2017-04-12 PROBLEM — O36.5990 IUGR (INTRAUTERINE GROWTH RESTRICTION) AFFECTING CARE OF MOTHER: Status: ACTIVE | Noted: 2017-04-12

## 2017-04-12 LAB
BASOPHILS # BLD AUTO: 0.4 % (ref 0–1.8)
BASOPHILS # BLD: 0.04 K/UL (ref 0–0.12)
EOSINOPHIL # BLD AUTO: 0.12 K/UL (ref 0–0.51)
EOSINOPHIL NFR BLD: 1.2 % (ref 0–6.9)
ERYTHROCYTE [DISTWIDTH] IN BLOOD BY AUTOMATED COUNT: 43.7 FL (ref 35.9–50)
HCT VFR BLD AUTO: 33.3 % (ref 37–47)
HGB BLD-MCNC: 11.4 G/DL (ref 12–16)
HOLDING TUBE BB 8507: NORMAL
IMM GRANULOCYTES # BLD AUTO: 0.13 K/UL (ref 0–0.11)
IMM GRANULOCYTES NFR BLD AUTO: 1.3 % (ref 0–0.9)
LYMPHOCYTES # BLD AUTO: 1.59 K/UL (ref 1–4.8)
LYMPHOCYTES NFR BLD: 15.6 % (ref 22–41)
MCH RBC QN AUTO: 32.3 PG (ref 27–33)
MCHC RBC AUTO-ENTMCNC: 34.2 G/DL (ref 33.6–35)
MCV RBC AUTO: 94.3 FL (ref 81.4–97.8)
MONOCYTES # BLD AUTO: 0.51 K/UL (ref 0–0.85)
MONOCYTES NFR BLD AUTO: 5 % (ref 0–13.4)
NEUTROPHILS # BLD AUTO: 7.78 K/UL (ref 2–7.15)
NEUTROPHILS NFR BLD: 76.5 % (ref 44–72)
NRBC # BLD AUTO: 0 K/UL
NRBC BLD AUTO-RTO: 0 /100 WBC
PLATELET # BLD AUTO: 182 K/UL (ref 164–446)
PMV BLD AUTO: 11.5 FL (ref 9–12.9)
RBC # BLD AUTO: 3.53 M/UL (ref 4.2–5.4)
WBC # BLD AUTO: 10.2 K/UL (ref 4.8–10.8)

## 2017-04-12 PROCEDURE — 4A1H74Z MONITORING OF PRODUCTS OF CONCEPTION, CARDIAC ELECTRICAL ACTIVITY, VIA NATURAL OR ARTIFICIAL OPENING: ICD-10-PCS | Performed by: OBSTETRICS & GYNECOLOGY

## 2017-04-12 PROCEDURE — 700101 HCHG RX REV CODE 250: Performed by: OBSTETRICS & GYNECOLOGY

## 2017-04-12 PROCEDURE — 700101 HCHG RX REV CODE 250

## 2017-04-12 PROCEDURE — 10907ZC DRAINAGE OF AMNIOTIC FLUID, THERAPEUTIC FROM PRODUCTS OF CONCEPTION, VIA NATURAL OR ARTIFICIAL OPENING: ICD-10-PCS | Performed by: OBSTETRICS & GYNECOLOGY

## 2017-04-12 PROCEDURE — 700111 HCHG RX REV CODE 636 W/ 250 OVERRIDE (IP): Performed by: OBSTETRICS & GYNECOLOGY

## 2017-04-12 PROCEDURE — 85025 COMPLETE CBC W/AUTO DIFF WBC: CPT

## 2017-04-12 PROCEDURE — 700111 HCHG RX REV CODE 636 W/ 250 OVERRIDE (IP)

## 2017-04-12 PROCEDURE — 770002 HCHG ROOM/CARE - OB PRIVATE (112)

## 2017-04-12 PROCEDURE — 3E033VJ INTRODUCTION OF OTHER HORMONE INTO PERIPHERAL VEIN, PERCUTANEOUS APPROACH: ICD-10-PCS | Performed by: OBSTETRICS & GYNECOLOGY

## 2017-04-12 PROCEDURE — 10H07YZ INSERTION OF OTHER DEVICE INTO PRODUCTS OF CONCEPTION, VIA NATURAL OR ARTIFICIAL OPENING: ICD-10-PCS | Performed by: OBSTETRICS & GYNECOLOGY

## 2017-04-12 RX ORDER — ONDANSETRON 4 MG/1
4 TABLET, ORALLY DISINTEGRATING ORAL EVERY 6 HOURS PRN
Status: DISCONTINUED | OUTPATIENT
Start: 2017-04-12 | End: 2017-04-13

## 2017-04-12 RX ORDER — CARBOPROST TROMETHAMINE 250 UG/ML
250 INJECTION, SOLUTION INTRAMUSCULAR
Status: DISCONTINUED | OUTPATIENT
Start: 2017-04-12 | End: 2017-04-13 | Stop reason: HOSPADM

## 2017-04-12 RX ORDER — SODIUM CHLORIDE, SODIUM LACTATE, POTASSIUM CHLORIDE, CALCIUM CHLORIDE 600; 310; 30; 20 MG/100ML; MG/100ML; MG/100ML; MG/100ML
INJECTION, SOLUTION INTRAVENOUS CONTINUOUS
Status: DISCONTINUED | OUTPATIENT
Start: 2017-04-12 | End: 2017-04-13

## 2017-04-12 RX ORDER — MISOPROSTOL 200 UG/1
800 TABLET ORAL
Status: DISCONTINUED | OUTPATIENT
Start: 2017-04-12 | End: 2017-04-13 | Stop reason: HOSPADM

## 2017-04-12 RX ORDER — ONDANSETRON 2 MG/ML
4 INJECTION INTRAMUSCULAR; INTRAVENOUS EVERY 6 HOURS PRN
Status: DISCONTINUED | OUTPATIENT
Start: 2017-04-12 | End: 2017-04-13

## 2017-04-12 RX ORDER — SODIUM CHLORIDE, SODIUM LACTATE, POTASSIUM CHLORIDE, CALCIUM CHLORIDE 600; 310; 30; 20 MG/100ML; MG/100ML; MG/100ML; MG/100ML
INJECTION, SOLUTION INTRAVENOUS CONTINUOUS
Status: DISPENSED | OUTPATIENT
Start: 2017-04-12 | End: 2017-04-12

## 2017-04-12 RX ORDER — DEXTROSE, SODIUM CHLORIDE, SODIUM LACTATE, POTASSIUM CHLORIDE, AND CALCIUM CHLORIDE 5; .6; .31; .03; .02 G/100ML; G/100ML; G/100ML; G/100ML; G/100ML
INJECTION, SOLUTION INTRAVENOUS CONTINUOUS
Status: DISCONTINUED | OUTPATIENT
Start: 2017-04-12 | End: 2017-04-13 | Stop reason: HOSPADM

## 2017-04-12 RX ORDER — METHYLERGONOVINE MALEATE 0.2 MG/ML
0.2 INJECTION INTRAVENOUS
Status: DISCONTINUED | OUTPATIENT
Start: 2017-04-12 | End: 2017-04-13 | Stop reason: HOSPADM

## 2017-04-12 RX ORDER — ROPIVACAINE HYDROCHLORIDE 2 MG/ML
INJECTION, SOLUTION EPIDURAL; INFILTRATION; PERINEURAL
Status: COMPLETED
Start: 2017-04-12 | End: 2017-04-12

## 2017-04-12 RX ORDER — BUPIVACAINE HYDROCHLORIDE 2.5 MG/ML
INJECTION, SOLUTION EPIDURAL; INFILTRATION; INTRACAUDAL
Status: ACTIVE
Start: 2017-04-12 | End: 2017-04-13

## 2017-04-12 RX ADMIN — ONDANSETRON 4 MG: 2 INJECTION INTRAMUSCULAR; INTRAVENOUS at 21:13

## 2017-04-12 RX ADMIN — DINOPROSTONE 5 MG: 20 SUPPOSITORY VAGINAL at 11:21

## 2017-04-12 RX ADMIN — Medication 2 MILLI-UNITS/MIN: at 21:02

## 2017-04-12 RX ADMIN — SODIUM CHLORIDE, POTASSIUM CHLORIDE, SODIUM LACTATE AND CALCIUM CHLORIDE: 600; 310; 30; 20 INJECTION, SOLUTION INTRAVENOUS at 19:24

## 2017-04-12 RX ADMIN — ROPIVACAINE HYDROCHLORIDE 10 ML/HR: 2 INJECTION, SOLUTION EPIDURAL; INFILTRATION at 19:48

## 2017-04-12 RX ADMIN — FENTANYL CITRATE 100 MCG: 50 INJECTION INTRAMUSCULAR; INTRAVENOUS at 18:48

## 2017-04-12 RX ADMIN — SODIUM CHLORIDE, POTASSIUM CHLORIDE, SODIUM LACTATE AND CALCIUM CHLORIDE: 600; 310; 30; 20 INJECTION, SOLUTION INTRAVENOUS at 19:59

## 2017-04-12 ASSESSMENT — COPD QUESTIONNAIRES
DURING THE PAST 4 WEEKS HOW MUCH DID YOU FEEL SHORT OF BREATH: NONE/LITTLE OF THE TIME
COPD SCREENING SCORE: 0
DO YOU EVER COUGH UP ANY MUCUS OR PHLEGM?: NO/ONLY WITH OCCASIONAL COLDS OR INFECTIONS
HAVE YOU SMOKED AT LEAST 100 CIGARETTES IN YOUR ENTIRE LIFE: NO/DON'T KNOW

## 2017-04-12 ASSESSMENT — LIFESTYLE VARIABLES
DO YOU DRINK ALCOHOL: NO
ALCOHOL_USE: NO
DO YOU DRINK ALCOHOL: NO
EVER_SMOKED: YES

## 2017-04-12 NOTE — PROGRESS NOTES
0800 Pt here for IOL for IUGR. Pt to 222 oriented to room and call system. External monitors placed and WNL. VS taken and WNL. MD notified of pt arrival and current status.  0900 Dr Jesus in to see pt- SVE done 1cm thick 1- to 0st. Induction options discussed. Will obtain an Reactive NST and place prostin gel.  1121 Prostin gel placed by MD.

## 2017-04-12 NOTE — IP AVS SNAPSHOT
Home Care Instructions                                                                                                                Rhea Baum   MRN: [id number]    Department:  POST PARTUM 31   2017           Your appointments     May 30, 2017  3:40 PM   FOLLOW UP with Olaf Umana M.D.   [facility] (--)    [address], [address]  [place] [id number] [phone]              Follow-up Information     1. Follow up with Joelle Stallings M.D.. Schedule an appointment as soon as possible for a visit in 6 weeks.    Specialty:  OB/Gyn    Contact information    [address]  [address]  [place] [id number] [phone]         I assume responsibility for securing a follow-up  Screening blood test on my baby within the specified date range.    -                  Discharge Instructions       POSTPARTUM DISCHARGE INSTRUCTIONS FOR MOM    YOB: 1985   Age: 31 y.o.               Admit Date: 2017     Discharge Date: 2017  Attending Doctor:  Joelle Stallings M.D.                  Allergies:  Review of patient's allergies indicates no known allergies.    Discharged to home by car. Discharged via wheelchair, hospital escort: Yes.  Special equipment needed: Not Applicable  Belongings with: Personal  Be sure to schedule a follow-up appointment with your primary care doctor or any specialists as instructed.     Discharge Plan:   Diet Plan: Discussed  Activity Level: Discussed  Confirmed Follow up Appointment: Patient to Call and Schedule Appointment  Confirmed Symptoms Management: Discussed  Medication Reconciliation Updated: Yes    REASONS TO CALL YOUR OBSTETRICIAN:  1.   Persistent fever or shaking chills (Temperature higher than 100.4)  2.   Heavy bleeding (soaking more than 1 pad per hour); Passing clots  3.   Foul odor from vagina  4.   Mastitis (Breast infection; breast pain, chills, fever, redness)  5.   Urinary pain, burning or frequency  6.   Episiotomy  "infection    8.   Severe depression longer than 24 hours    HAND WASHING  · Prior to handling the baby.  · Before breastfeeding or bottle feeding baby.  · After using the bathroom or changing the baby's diaper.    VAGINAL CARE  · Nothing inside vagina for 6 weeks: no sexual intercourse, tampons or douching.  · Bleeding may continue for 2-4 weeks.  Amount may vary.    · Call your physician for heavy bleeding which means soaking more than 1 pad per hour    BIRTH CONTROL  · It is possible to become pregnant at any time after delivery and while breastfeeding.  · Plan to discuss a method of birth control with your physician at your follow up visit. visit.    DIET AND ELIMINATION  · Eating more fiber (bran cereal, fruits, and vegetables) and drinking plenty of fluids will help to avoid constipation.  · Urinary frequency after childbirth is normal.    POSTPARTUM BLUES  During the first few days after birth, you may experience a sense of the \"blues\" which may include impatience, irritability or even crying.  These feeling come and go quickly.  However, as many as 1 in 10 women experience emotional symptoms known as postpartum depression.    Postpartum depression:  May start as early as the second or third day after delivery or take several weeks or months to develop.  Symptoms of \"blues\" are present, but are more intense:  Crying spells; loss of appetite; feelings of hopelessness or loss of control; fear of touching the baby; over concern or no concern at all about the baby; little or no concern about your own appearance/caring for yourself; and/or inability to sleep or excessive sleeping.  Contact your physician if you are experiencing any of these symptoms.    Crisis Hotline:  · [place] Crisis Hotline:  3-988-YXATRMI  Or 1-520.145.2793  · [place] Crisis Hotline:  1-997.794.6904  Or 316-882-4127    PREVENTING SHAKEN BABY:  If you are angry or stressed, PUT THE BABY IN THE CRIB, step away, take some deep breaths, and wait " "until you are calm to care for the baby.  DO NOT SHAKE THE BABY.  You are not alone, call a supporter for help.    · Crisis Call Center 24/7 crisis line 404-048-4949 or 1-619.433.2407  · You can also text them, text \"ANSWER\" to 075797    QUIT SMOKING/TOBACCO USE:  I understand the use of any tobacco products increases my chance of suffering from future heart disease and could cause other illnesses which may shorten my life. Quitting the use of tobacco products is the single most important thing I can do to improve my health. For further information on smoking / tobacco cessation call a Toll Free Quit Line at 1-704.526.1863 (*[employer] [place]) or 1-444.147.7769 (American Lung Association) or you can access the web based program at www.lungusa.org.    · [place] Tobacco Users Help Line:  (769) 604-2878       Toll Free: 1-949.418.4273  · Quit Tobacco Program [facility] (983)059-4005    DEPRESSION / SUICIDE RISK:  As you are discharged from this [facility], it is important to learn how to keep safe from harming yourself.    Recognize the warning signs:  · Abrupt changes in personality, positive or negative- including increase in energy   · Giving away possessions  · Change in eating patterns- significant weight changes-  positive or negative  · Change in sleeping patterns- unable to sleep or sleeping all the time   · Unwillingness or inability to communicate  · Depression  · Unusual sadness, discouragement and loneliness  · Talk of wanting to die  · Neglect of personal appearance   · Rebelliousness- reckless behavior  · Withdrawal from people/activities they love  · Confusion- inability to concentrate     If you or a loved one observes any of these behaviors or has concerns about self-harm, here's what you can do:  · Talk about it- your feelings and reasons for harming yourself  · Remove any means that you might use to hurt yourself (examples: pills, rope, extension cords, " firearm)  · Get professional help from the community (Mental Health, Substance Abuse, psychological counseling)  · Do not be alone:Call your Safe Contact- someone whom you trust who will be there for you.  · Call your local CRISIS HOTLINE 932-7707 or 677-502-9060  · Call your local Children's Mobile Crisis Response Team [place] (696) 196-3048 or www.[employer] [place] [employer]  · Call the toll free National Suicide Prevention Hotlines   · National Suicide Prevention Lifeline 007-668-NAMA (5141)  · [employer] Hope Line Network 800-SUICIDE (593-0000)    DISCHARGE SURVEY:  Thank you for choosing [facility].  We hope we provided you with very good care.  You may be receiving a survey in the mail.  Please fill it out.  Your opinion is valuable to us.    ADDITIONAL EDUCATIONAL MATERIALS GIVEN TO PATIENT:        My signature on this form indicates that:  1.  I have reviewed and understand the above information  2.  My questions regarding this information have been answered to my satisfaction.  3.  I have formulated a plan with my discharge nurse to obtain my prescribed medication for home.         Discharge Medication Instructions:    Below are the medications your physician expects you to take upon discharge:    Review all your home medications and newly ordered medications with your doctor and/or pharmacist. Follow medication instructions as directed by your doctor and/or pharmacist.    Please keep your medication list with you and share with your physician.               Medication List      START taking these medications        Instructions    ibuprofen 600 MG Tabs   Last time this was given:  600 mg on 4/14/2017  8:07 AM   Commonly known as:  MOTRIN    Take 1 Tab by mouth every 6 hours as needed for Moderate Pain.   Dose:  600 mg         CONTINUE taking these medications        Instructions    EQL FIBER SUPPLEMENT PO    Take  by mouth.       MIRALAX PO    Take  by mouth.       PRENATAL VITAMIN PO    Take  by mouth.        PROBIOTIC & ACIDOPHILUS EX ST PO    Take  by mouth.               Crisis Hotline:     [place] Crisis Hotline:  3-658-ACCYASZ or 1-632.620.1801    [place] Crisis Hotline:    1-910.875.7100 or 664-897-4309        Disclaimer           _____________________________________                     __________       ________       Patient/Mother Signature or Legal                          Date                   Time

## 2017-04-12 NOTE — IP AVS SNAPSHOT
4/14/2017    Rhea Baum  [address]  [place] [address]    Dear Rhea:    [facility] wants to ensure your discharge home is safe and you or your loved ones have had all of your questions answered regarding your care after you leave the hospital.    Below is a list of resources and contact information should you have any questions regarding your hospital stay, follow-up instructions, or active medical symptoms.    Questions or Concerns Regarding… Contact   Medical Questions Related to Your Discharge  (7 days a week, 8am-5pm) Contact a Nurse Care Coordinator   [phone]   Medical Questions Not Related to Your Discharge  (24 hours a day / 7 days a week)  Contact the Nurse Health Line   [phone]    Medications or Discharge Instructions Refer to your discharge packet   or contact your [facility] Primary Care Provider   [phone]   Follow-up Appointment(s) Schedule your appointment via Bioniz   or contact Scheduling [phone]   Billing Review your statement via Bioniz  or contact Billing [phone]   Medical Records Review your records via Bioniz   or contact Medical Records [phone]     You may receive a telephone call within two days of discharge. This call is to make certain you understand your discharge instructions and have the opportunity to have any questions answered. You can also easily access your medical information, test results and upcoming appointments via the Bioniz free online health management tool. You can learn more and sign up at Plerts/Bioniz. For assistance setting up your Bioniz account, please call [phone].    Once again, we want to ensure your discharge home is safe and that you have a clear understanding of any next steps in your care. If you have any questions or concerns, please do not hesitate to contact us, we are here for you. Thank you for choosing [facility] for your healthcare needs.    Sincerely,    Your [facility] Healthcare Team

## 2017-04-12 NOTE — IP AVS SNAPSHOT
Armor5 Access Code: Activation code not generated  Current Armor5 Status: Active    [email or web address]  A secure, online tool to manage your health information     [employer]’s Armor5® is a secure, online tool that connects you to your personalized health information from the privacy of your home -- day or night - making it very easy for you to manage your healthcare. Once the activation process is completed, you can even access your medical information using the Armor5 lupe, which is available for free in the Apple Lupe store or Google Play store.     Armor5 provides the following levels of access (as shown below):   My Chart Features   [facility] Primary Care Doctor [facility]  Specialists [facility]  Urgent  Care Non-[facility]  Primary Care  Doctor   Email your healthcare team securely and privately 24/7 X X X X   Manage appointments: schedule your next appointment; view details of past/upcoming appointments X      Request prescription refills. X      View recent personal medical records, including lab and immunizations X X X X   View health record, including health history, allergies, medications X X X X   Read reports about your outpatient visits, procedures, consult and ER notes X X X X   See your discharge summary, which is a recap of your hospital and/or ER visit that includes your diagnosis, lab results, and care plan. X X       How to register for Armor5:  1. Go to  [email or web address].  2. Click on the Sign Up Now box, which takes you to the New Member Sign Up page. You will need to provide the following information:  a. Enter your Armor5 Access Code exactly as it appears at the top of this page. (You will not need to use this code after you’ve completed the sign-up process. If you do not sign up before the expiration date, you must request a new code.)   b. Enter your date of birth.   c. Enter your home email address.   d. Click Submit, and follow the next screen’s instructions.  3. Create a Armor5 ID. This will  be your BlueLithium login ID and cannot be changed, so think of one that is secure and easy to remember.  4. Create a BlueLithium password. You can change your password at any time.  5. Enter your Password Reset Question and Answer. This can be used at a later time if you forget your password.   6. Enter your e-mail address. This allows you to receive e-mail notifications when new information is available in BlueLithium.  7. Click Sign Up. You can now view your health information.    For assistance activating your BlueLithium account, call [phone]

## 2017-04-12 NOTE — H&P
History and Physical      hRea Baum is a 31 y.o. female  at 38w0d who presents for IOL due to IUGR    Subjective:   negative  For CTXS.   negative Feels pain   negative for LOF  positive for vaginal bleeding.   positive for fetal movement    ROS: A comprehensive review of systems was negative.    Past Medical History   Diagnosis Date   • Multiple sclerosis during pregnancy (CMS-HCC)    • PSVT (paroxysmal supraventricular tachycardia) (CMS-HCC)    • Anxiety    • GERD (gastroesophageal reflux disease)      No past surgical history on file.  OB History    Para Term  AB SAB TAB Ectopic Multiple Living   1               # Outcome Date GA Lbr Thomas/2nd Weight Sex Delivery Anes PTL Lv   1 Current                 Social History     Social History   • Marital Status:      Spouse Name: N/A   • Number of Children: N/A   • Years of Education: N/A     Occupational History   • Not on file.     Social History Main Topics   • Smoking status: Former Smoker   • Smokeless tobacco: Never Used   • Alcohol Use: No   • Drug Use: No   • Sexual Activity:     Partners: Male     Other Topics Concern   • Not on file     Social History Narrative     Allergies: Review of patient's allergies indicates no known allergies.  No current facility-administered medications for this encounter.    Prenatal care with Dr Stallings starting at 12 weeks with following problems:  Patient Active Problem List    Diagnosis Date Noted   • IUGR (intrauterine growth restriction) affecting care of mother 2017   • SGA (small for gestational age) 2017   • Multiple sclerosis affecting pregnancy (CMS-HCC) 2016   • PSVT (paroxysmal supraventricular tachycardia) (CMS-HCC) 2016   • Constipation due to neurogenic bowel 2016               Objective:      Last menstrual period 2016.    General:   no acute distress   Skin:   normal   HEENT:  Neck supple with midline trachea   Lungs:   CTA bilateral   Heart:   S1, S2  normal, no murmur, click, rub or gallop, regular rate and rhythm, brisk carotid upstroke without bruits, peripheral pulses very brisk, chest is clear without rales or wheezing, no pedal edema, no JVD, no hepatosplenomegaly   Abdomen:   gravid, NT   EFW:  5 lbs   Pelvis:  adequate with gynecoid pelvis   FHT:  130 BPM   Uterine Size: S<D   Presentations: Cephalic   Cervix:     Dilation: 1cm    Effacement: 25%    Station:  -1    Consistency: Medium    Position: Middle     Lab Review  Lab:   Blood type: O     Recent Results (from the past 5880 hour(s))   CBC WITH DIFFERENTIAL    Collection Time: 09/15/16  4:06 PM   Result Value Ref Range    WBC 7.9 4.8 - 10.8 K/uL    RBC 4.01 (L) 4.20 - 5.40 M/uL    Hemoglobin 12.5 12.0 - 16.0 g/dL    Hematocrit 37.6 37.0 - 47.0 %    MCV 93.8 81.4 - 97.8 fL    MCH 31.2 27.0 - 33.0 pg    MCHC 33.2 (L) 33.6 - 35.0 g/dL    RDW 40.9 35.9 - 50.0 fL    Platelet Count 263 164 - 446 K/uL    MPV 11.8 9.0 - 12.9 fL    Neutrophils-Polys 60.60 44.00 - 72.00 %    Lymphocytes 28.60 22.00 - 41.00 %    Monocytes 8.00 0.00 - 13.40 %    Eosinophils 1.90 0.00 - 6.90 %    Basophils 0.50 0.00 - 1.80 %    Immature Granulocytes 0.40 0.00 - 0.90 %    Nucleated RBC 0.00 /100 WBC    Neutrophils (Absolute) 4.79 2.00 - 7.15 K/uL    Lymphs (Absolute) 2.26 1.00 - 4.80 K/uL    Monos (Absolute) 0.63 0.00 - 0.85 K/uL    Eos (Absolute) 0.15 0.00 - 0.51 K/uL    Baso (Absolute) 0.04 0.00 - 0.12 K/uL    Immature Granulocytes (abs) 0.03 0.00 - 0.11 K/uL    NRBC (Absolute) 0.00 K/uL   COMP METABOLIC PANEL    Collection Time: 09/15/16  4:06 PM   Result Value Ref Range    Sodium 136 135 - 145 mmol/L    Potassium 4.2 3.6 - 5.5 mmol/L    Chloride 107 96 - 112 mmol/L    Co2 23 20 - 33 mmol/L    Anion Gap 6.0 0.0 - 11.9    Glucose 75 65 - 99 mg/dL    Bun 7 (L) 8 - 22 mg/dL    Creatinine 0.59 0.50 - 1.40 mg/dL    Calcium 9.3 8.5 - 10.5 mg/dL    AST(SGOT) 12 12 - 45 U/L    ALT(SGPT) 15 2 - 50 U/L    Alkaline Phosphatase 44 30 - 99  U/L    Total Bilirubin 0.6 0.1 - 1.5 mg/dL    Albumin 4.2 3.2 - 4.9 g/dL    Total Protein 6.7 6.0 - 8.2 g/dL    Globulin 2.5 1.9 - 3.5 g/dL    A-G Ratio 1.7 g/dL   ESTIMATED GFR    Collection Time: 09/15/16  4:06 PM   Result Value Ref Range    GFR If African American >60 >60 mL/min/1.73 m 2    GFR If Non African American >60 >60 mL/min/1.73 m 2   POCT US - In Clinic OB Scan    Collection Time: 10/13/16  1:18 PM   Result Value Ref Range    In Clinic OB Scan     SEQUENTIAL 1    Collection Time: 10/20/16  3:54 PM   Result Value Ref Range    Results Report     Test Results SEE BELOW     Submit Part 2 Sample Using: Date:     Crown Rump Length 60.4 mm    CRL Scan Date Date:     Sonographer ID: [id number]     Gest. Age on Collection Date 12.4 weeks    Maternal [age] At SHANNAN 30.5 years    Race      Weight 1st 109 lbs    Number of Fetuses 1     Nuchal Translucency 1.3 mm    NT MoM 0.81     Additional US SEE BELOW     Hcg Value 78.1 IU/mL    Hcg Mom 0.66     PANCHO-A Value 1290.3 ng/mL    PANCHO-A MoM 0.94     Down Syndrome Screening Risk:     Dsr By Age Age Risk:     Trisomy 18 Screening Risk:     T18 By Age Age Risk:     Interpretation SEE BELOW     T18 Interpretation SEE BELOW     Comment: SEE BELOW     Note: SEE BELOW    EKG    Collection Time: 16  3:49 PM   Result Value Ref Range    Report       [facility] [place] [facility]    Test Date:  2016  Pt Name:    FLORENCIO SINCLAIR                Department: [facility]  MRN:        [id number]                      Room:  Gender:     F                            Technician: ALETHA  :        1985                   Requested By:OLAF DEL RIO  Order #:    [id number]                    Reading MD: Olaf Del Rio MD    Measurements  Intervals                                Axis  Rate:       77                           P:          23  NV:         140                          QRS:        68  QRSD:       70                           T:          65  QT:         364  QTc:         412    Interpretive Statements  SINUS RHYTHM  BORDERLINE T ABNORMALITIES, ANT-LAT LEADS  No previous ECG available for comparison    Electronically Signed On 11-3-2016 16:34:02 PDT by Olaf Umana MD     THINPREP RFLX HPV ASCUS W/CTNG    Collection Time: 11/04/16 11:46 AM   Result Value Ref Range    Cytology Reg See Path Report     Source Genital     C. trachomatis by PCR Negative Negative    N. gonorrhoeae by PCR Negative Negative   SEQUENTIAL 2    Collection Time: 11/19/16 10:35 AM   Result Value Ref Range    Results Report     Results SEE BELOW     Crown Rump Length 60.4 mm    CRL Scan Date Date:     Sonographer ID: [id number]     Collected On (1st) Date:     Gest. Age on Collection Date 12.4 weeks    Weight 1st 109 lbs    Collected On (2nd) Date:     Gestational Age 16.7 weeks    Weight (2nd) 109 lbs    Maternal Age At SHANNAN 30.5 years    Race      Insulin Dependent Diabetes No     Number of Fetuses 1     Nuchal Translucency 1.3 mm    NT MoM 0.81     Additional US SEE BELOW     PANCHO-A Value 1290.3 ng/mL    PANCHO-A MoM 0.94     AFP Value -Eia 56.9 ng/mL    AFP MOM Value 1.43     Hcg Value 25.2 IU/mL    Hcg Mom 0.71     Ue3 Value 0.84 ng/mL    Ue3 Mom 0.79     Surekha Value -Eia 438.7 pg/mL    Surekha Mom Value 2.07     Open Spina Bifida Screening Risk:     Down Syndrome Screening Risk:     Dsr By Age Age Risk:     Trisomy 18 Screening Risk:     T18 By Age Age Risk:     OSB Interpretation SEE BELOW     Interpretation SEE BELOW     T18 Interpretation SEE BELOW     Comment: SEE BELOW     Note: SEE BELOW    PRENATAL PANEL 3+HIV+UACXI    Collection Time: 11/19/16 10:36 AM   Result Value Ref Range    Micro Urine Req Microscopic     Color Yellow     Character Sl Cloudy (A)     Specific Gravity 1.013 <1.035    Ph 7.0 5.0-8.0    Glucose Negative Negative mg/dL    Ketones Negative Negative mg/dL    Protein Negative Negative mg/dL    Bilirubin Negative Negative    Nitrite Negative Negative    Leukocyte Esterase Negative Negative     Occult Blood Negative Negative    Culture Indicated No UA Culture    WBC 7.7 4.8 - 10.8 K/uL    RBC 3.37 (L) 4.20 - 5.40 M/uL    Hemoglobin 11.0 (L) 12.0 - 16.0 g/dL    Hematocrit 33.0 (L) 37.0 - 47.0 %    MCV 97.9 (H) 81.4 - 97.8 fL    MCH 32.6 27.0 - 33.0 pg    MCHC 33.3 (L) 33.6 - 35.0 g/dL    RDW 46.0 35.9 - 50.0 fL    Platelet Count 239 164 - 446 K/uL    MPV 10.9 9.0 - 12.9 fL    Neutrophils-Polys 70.70 44.00 - 72.00 %    Lymphocytes 21.10 (L) 22.00 - 41.00 %    Monocytes 5.40 0.00 - 13.40 %    Eosinophils 1.70 0.00 - 6.90 %    Basophils 0.60 0.00 - 1.80 %    Immature Granulocytes 0.50 0.00 - 0.90 %    Nucleated RBC 0.00 /100 WBC    Neutrophils (Absolute) 5.47 2.00 - 7.15 K/uL    Lymphs (Absolute) 1.63 1.00 - 4.80 K/uL    Monos (Absolute) 0.42 0.00 - 0.85 K/uL    Eos (Absolute) 0.13 0.00 - 0.51 K/uL    Baso (Absolute) 0.05 0.00 - 0.12 K/uL    Immature Granulocytes (abs) 0.04 0.00 - 0.11 K/uL    NRBC (Absolute) 0.00 K/uL    Rubella IgG Antibody 39.30 IU/mL    Syphilis, Treponemal Qual Non Reactive Non Reactive    Hepatitis B Surface Antigen Negative Negative   HIV ANTIBODIES    Collection Time: 16 10:36 AM   Result Value Ref Range    HIV Ag/Ab Combo Assay Non Reactive Non Reactive   OP PRENATAL PANEL-BLOOD BANK    Collection Time: 16 10:36 AM   Result Value Ref Range    ABO Grouping Only O     Rh Grouping Only POS     Antibody Screen Scrn NEG    URINE MICROSCOPIC (W/UA)    Collection Time: 16 10:36 AM   Result Value Ref Range    WBC 0-2 /hpf    RBC 0-2 /hpf    Bacteria Few (A) None /hpf    Epithelial Cells Moderate (A) /hpf    Mucous Threads Few /hpf   EKG    Collection Time: 16  5:25 PM   Result Value Ref Range    Report       [facility] [place] [facility]    Test Date:  2016  Pt Name:    FLORENCIO SINCLAIR                Department: [facility]  MRN:        [id number]                      Room:  Gender:     F                            Technician: STACEY  :        1985                    Requested By:SUZETTE DEL RIO  Order #:    [id number]                    Reading MD: Mirian Coyle MD    Measurements  Intervals                                Axis  Rate:       78                           P:          61  MI:         148                          QRS:        64  QRSD:       76                           T:          54  QT:         368  QTc:        420    Interpretive Statements  SINUS RHYTHM  Compared to ECG 11/03/2016 15:49:34  NO SIGNIFICANT CHANGE    Electronically Signed On 12- 10:16:43 PST by Mirian Coyle MD     HGB    Collection Time: 01/21/17 11:13 AM   Result Value Ref Range    Hemoglobin 10.1 (L) 12.0 - 16.0 g/dL   HCT    Collection Time: 01/21/17 11:13 AM   Result Value Ref Range    Hematocrit 31.3 (L) 37.0 - 47.0 %   T.PALLIDUM AB EIA    Collection Time: 01/21/17 11:13 AM   Result Value Ref Range    Syphilis, Treponemal Qual Non Reactive Non Reactive   GLUCOSE 1HR GESTATIONAL    Collection Time: 01/21/17 11:13 AM   Result Value Ref Range    Glucose, Post Dose 135 70 - 139 mg/dL   POCT Fetal Nonstress Test    Collection Time: 03/23/17  4:01 PM   Result Value Ref Range    NST Indications S<D     NST Baseline 140s     NST Uterine Activity none     NST Acoustic Stimulation      NST Assessment not reactive     NST Action Necessary to L&D for BPP     NST Other Data      NST Return twice weekly     NST Read By Dogeo    GRP B STREP, BY PCR (MEYER BROTH)    Collection Time: 03/23/17  4:43 PM   Result Value Ref Range    Strep Gp B DNA PCR Negative Negative   POCT Fetal Nonstress Test    Collection Time: 03/29/17 10:25 AM   Result Value Ref Range    NST Indications S<D     NST Baseline 140s     NST Uterine Activity none     NST Acoustic Stimulation      NST Assessment Cat 1     NST Action Necessary      NST Other Data      NST Return twice weekly     NST Read By Haylie    POCT Fetal Nonstress Test    Collection Time: 04/04/17  8:49 AM   Result Value Ref Range    NST Indications IUGR     NST  Baseline 150     NST Uterine Activity none     NST Acoustic Stimulation yes     NST Assessment nonreactive     NST Action Necessary needs BPP     NST Other Data      NST Return      NST Read By     POCT Fetal Nonstress Test    Collection Time: 04/07/17  8:52 AM   Result Value Ref Range    NST Indications SGA     NST Baseline 130     NST Uterine Activity None     NST Acoustic Stimulation      NST Assessment Cat 1     NST Action Necessary      NST Other Data      NST Return      NST Read By Lacey         Assessment:   Rhea Bamu at 38w0d  Labor status: Not in labor.  Obstetrical history significant for   Patient Active Problem List    Diagnosis Date Noted   • IUGR (intrauterine growth restriction) affecting care of mother 04/12/2017   • SGA (small for gestational age) 03/23/2017   • Multiple sclerosis affecting pregnancy (CMS-[facility]) 12/01/2016   • PSVT (paroxysmal supraventricular tachycardia) (CMS-HCC) 12/01/2016   • Constipation due to neurogenic bowel 12/01/2016   .      Plan:     Admit to L&D  GBS negative

## 2017-04-13 PROCEDURE — 90471 IMMUNIZATION ADMIN: CPT

## 2017-04-13 PROCEDURE — 700112 HCHG RX REV CODE 229

## 2017-04-13 PROCEDURE — A9270 NON-COVERED ITEM OR SERVICE: HCPCS | Performed by: OBSTETRICS & GYNECOLOGY

## 2017-04-13 PROCEDURE — 36415 COLL VENOUS BLD VENIPUNCTURE: CPT

## 2017-04-13 PROCEDURE — 700111 HCHG RX REV CODE 636 W/ 250 OVERRIDE (IP)

## 2017-04-13 PROCEDURE — 303615 HCHG EPIDURAL/SPINAL ANESTHESIA FOR LABOR

## 2017-04-13 PROCEDURE — 700111 HCHG RX REV CODE 636 W/ 250 OVERRIDE (IP): Performed by: OBSTETRICS & GYNECOLOGY

## 2017-04-13 PROCEDURE — 700102 HCHG RX REV CODE 250 W/ 637 OVERRIDE(OP): Performed by: OBSTETRICS & GYNECOLOGY

## 2017-04-13 PROCEDURE — 770002 HCHG ROOM/CARE - OB PRIVATE (112)

## 2017-04-13 PROCEDURE — 0KQM0ZZ REPAIR PERINEUM MUSCLE, OPEN APPROACH: ICD-10-PCS | Performed by: OBSTETRICS & GYNECOLOGY

## 2017-04-13 PROCEDURE — 90715 TDAP VACCINE 7 YRS/> IM: CPT

## 2017-04-13 PROCEDURE — 304965 HCHG RECOVERY SERVICES

## 2017-04-13 PROCEDURE — 59409 OBSTETRICAL CARE: CPT

## 2017-04-13 PROCEDURE — 3E0234Z INTRODUCTION OF SERUM, TOXOID AND VACCINE INTO MUSCLE, PERCUTANEOUS APPROACH: ICD-10-PCS | Performed by: OBSTETRICS & GYNECOLOGY

## 2017-04-13 RX ORDER — IBUPROFEN 600 MG/1
600 TABLET ORAL EVERY 6 HOURS PRN
Status: DISCONTINUED | OUTPATIENT
Start: 2017-04-13 | End: 2017-04-14 | Stop reason: HOSPADM

## 2017-04-13 RX ORDER — ROPIVACAINE HYDROCHLORIDE 2 MG/ML
INJECTION, SOLUTION EPIDURAL; INFILTRATION; PERINEURAL
Status: COMPLETED
Start: 2017-04-13 | End: 2017-04-13

## 2017-04-13 RX ORDER — OXYCODONE HYDROCHLORIDE AND ACETAMINOPHEN 5; 325 MG/1; MG/1
1 TABLET ORAL EVERY 4 HOURS PRN
Status: DISCONTINUED | OUTPATIENT
Start: 2017-04-13 | End: 2017-04-14 | Stop reason: HOSPADM

## 2017-04-13 RX ORDER — BUPIVACAINE HYDROCHLORIDE 2.5 MG/ML
INJECTION, SOLUTION EPIDURAL; INFILTRATION; INTRACAUDAL
Status: DISCONTINUED
Start: 2017-04-13 | End: 2017-04-13

## 2017-04-13 RX ORDER — DOCUSATE SODIUM 100 MG/1
100 CAPSULE, LIQUID FILLED ORAL 2 TIMES DAILY PRN
Status: DISCONTINUED | OUTPATIENT
Start: 2017-04-13 | End: 2017-04-14 | Stop reason: HOSPADM

## 2017-04-13 RX ORDER — MISOPROSTOL 200 UG/1
600 TABLET ORAL
Status: DISCONTINUED | OUTPATIENT
Start: 2017-04-13 | End: 2017-04-14 | Stop reason: HOSPADM

## 2017-04-13 RX ORDER — SODIUM CHLORIDE, SODIUM LACTATE, POTASSIUM CHLORIDE, CALCIUM CHLORIDE 600; 310; 30; 20 MG/100ML; MG/100ML; MG/100ML; MG/100ML
INJECTION, SOLUTION INTRAVENOUS PRN
Status: DISCONTINUED | OUTPATIENT
Start: 2017-04-13 | End: 2017-04-14 | Stop reason: HOSPADM

## 2017-04-13 RX ORDER — METHYLERGONOVINE MALEATE 0.2 MG/ML
0.2 INJECTION INTRAVENOUS
Status: DISCONTINUED | OUTPATIENT
Start: 2017-04-13 | End: 2017-04-14 | Stop reason: HOSPADM

## 2017-04-13 RX ORDER — VITAMIN A ACETATE, BETA CAROTENE, ASCORBIC ACID, CHOLECALCIFEROL, .ALPHA.-TOCOPHEROL ACETATE, DL-, THIAMINE MONONITRATE, RIBOFLAVIN, NIACINAMIDE, PYRIDOXINE HYDROCHLORIDE, FOLIC ACID, CYANOCOBALAMIN, CALCIUM CARBONATE, FERROUS FUMARATE, ZINC OXIDE, CUPRIC OXIDE 3080; 12; 120; 400; 1; 1.84; 3; 20; 22; 920; 25; 200; 27; 10; 2 [IU]/1; UG/1; MG/1; [IU]/1; MG/1; MG/1; MG/1; MG/1; MG/1; [IU]/1; MG/1; MG/1; MG/1; MG/1; MG/1
1 TABLET, FILM COATED ORAL EVERY MORNING
Status: DISCONTINUED | OUTPATIENT
Start: 2017-04-14 | End: 2017-04-14 | Stop reason: HOSPADM

## 2017-04-13 RX ORDER — IBUPROFEN 600 MG/1
600 TABLET ORAL EVERY 6 HOURS PRN
Status: DISCONTINUED | OUTPATIENT
Start: 2017-04-13 | End: 2017-04-13

## 2017-04-13 RX ORDER — CARBOPROST TROMETHAMINE 250 UG/ML
250 INJECTION, SOLUTION INTRAMUSCULAR
Status: DISCONTINUED | OUTPATIENT
Start: 2017-04-13 | End: 2017-04-14 | Stop reason: HOSPADM

## 2017-04-13 RX ORDER — OXYCODONE HYDROCHLORIDE AND ACETAMINOPHEN 5; 325 MG/1; MG/1
2 TABLET ORAL EVERY 4 HOURS PRN
Status: DISCONTINUED | OUTPATIENT
Start: 2017-04-13 | End: 2017-04-14 | Stop reason: HOSPADM

## 2017-04-13 RX ADMIN — IBUPROFEN 600 MG: 600 TABLET, FILM COATED ORAL at 18:23

## 2017-04-13 RX ADMIN — TETANUS TOXOID, REDUCED DIPHTHERIA TOXOID AND ACELLULAR PERTUSSIS VACCINE, ADSORBED 0.5 ML: 5; 2.5; 8; 8; 2.5 SUSPENSION INTRAMUSCULAR at 15:22

## 2017-04-13 RX ADMIN — OXYTOCIN 2000 ML/HR: 10 INJECTION, SOLUTION INTRAMUSCULAR; INTRAVENOUS at 06:46

## 2017-04-13 RX ADMIN — Medication 125 ML/HR: at 08:06

## 2017-04-13 RX ADMIN — OXYCODONE HYDROCHLORIDE AND ACETAMINOPHEN 1 TABLET: 5; 325 TABLET ORAL at 18:23

## 2017-04-13 RX ADMIN — IBUPROFEN 600 MG: 600 TABLET, FILM COATED ORAL at 08:51

## 2017-04-13 RX ADMIN — SODIUM CHLORIDE, SODIUM LACTATE, POTASSIUM CHLORIDE, CALCIUM CHLORIDE AND DEXTROSE MONOHYDRATE: 5; 600; 310; 30; 20 INJECTION, SOLUTION INTRAVENOUS at 03:38

## 2017-04-13 RX ADMIN — OXYCODONE HYDROCHLORIDE AND ACETAMINOPHEN 1 TABLET: 5; 325 TABLET ORAL at 09:57

## 2017-04-13 RX ADMIN — ROPIVACAINE HYDROCHLORIDE 200 MG: 2 INJECTION, SOLUTION EPIDURAL; INFILTRATION at 05:16

## 2017-04-13 ASSESSMENT — PAIN SCALES - GENERAL
PAINLEVEL_OUTOF10: 2
PAINLEVEL_OUTOF10: 5
PAINLEVEL_OUTOF10: 5

## 2017-04-13 NOTE — PROGRESS NOTES
0340- Report received from MAGUE Wyman RN.  0355- Dr. Jesus, SVE 5-6//-1, orders to continue increasing pitocin for adequate contraction pattern.  0540- Dr. Estrada notified that pt has discomfort unresolved by position changes and epidural bolus button.   0545- SVE C/+3, KAYLA Young RN. Dr. Estrada at the bedside, epidural bolus not administered. Dr. Jesus updated on labor status, will set up for pushing.  0639- Dr. Jesus, , viable female, apgars 8, 9.  0646- Placenta del  0700- Report given to RIVAS Valente RN.

## 2017-04-13 NOTE — PROGRESS NOTES
0700- report received from Colin CORONA, POC discussed.  0925- pt up to bathroom, steady on feet. Pt unable to void at this time. Angie care done, pads applied, gowns changed.  0956- pt transferred to postpartum by w/c, report to Ermias CORONA

## 2017-04-13 NOTE — PROGRESS NOTES
1900 received report from MICHA Shelton RN, assumed care. Pt resting in bed, fentanyl was just given, bolusing for epidural, anesthesia aware, VSS. POC discussed, all questions answered, pt and family verbalize understanding.   1931 Dr. Estrada at bedside, time-out completed, all questions answered, pt consented, verbalized understanding.  1940 epidural placed, pt tolerated well.  2000 cole placed  2014 Dr. Jesus at bedside, AROM, clear fluid observed, SVE as noted. POC discussed, will continue to monitor.   2245 RN at bedside, pt resting comfortably, encouraged to call RN prn.  2300 Dr. Jesus in department, strip reviewed, POC discussed, will continue to monitor.   2344 Dr. Jesus at bedside, SVE as noted, IUPC placed, pt tolerated well, POC discussed,s trip reviewed. Will continue to monitor and titrate pitocin accordingly. Pt resting comfortably with epidural. Encouraged to call RN.  0650 report given to KAYLA Young RN, assumed care. POC discussed.

## 2017-04-13 NOTE — PROGRESS NOTES
L&D Progress Note    Name:   Rhea Baum   Date/Time:  4/12/2017 11:46 PM  Gestational Age:  38w0d  Admit Date:   4/12/2017  Admitting Dx:   IOL present in anterior chamber [Z96.1]  IUGR (intrauterine growth restriction) affecting care of mother, third trimester, fetus 3 [O36.5933]    Subjective:  Uterine contractions: yes  Pain:    no  Complaints:   no   Headache: .  no  Blurred vision:  no   SOB:    no   Nausea/vomiting:  no  Epigastric pain:  no  Fetal movement:  normal  Vaginal bleeding: . no    Objective:   Filed Vitals:    04/12/17 2000 04/12/17 2100 04/12/17 2200 04/12/17 2300   BP: 110/70 108/71 109/70 106/62   Pulse: 69 69 67 69   Temp: 36.9 °C (98.4 °F)      Resp:       Height:       Weight:       SpO2:    94%     Fetal heart variability: moderate  Fetal Heart Rate decelerations: none  Fetal Heart Rate accelerations: yes  Baseline FHR: 130 per minute  Uterine contractions: regular, every 3 minutes  Cervical: 50% ;  Dilatation .3 ; Station negative1    Fetal position:   Cephalic  Membranes ruptured: yes  AROM:  no  Meconium: .  no    IUPC: .   yes  FSE .   no    Meds:   Epidural : .  yes  Magnesium sulfate: . no  Pitocin: .  yes    Labs:  Recent Results (from the past 72 hour(s))   Hold Blood Bank Specimen (Not Tested)    Collection Time: 04/12/17 10:00 AM   Result Value Ref Range    Holding Tube - Bb DONE    CBC WITH DIFFERENTIAL    Collection Time: 04/12/17 10:00 AM   Result Value Ref Range    WBC 10.2 4.8 - 10.8 K/uL    RBC 3.53 (L) 4.20 - 5.40 M/uL    Hemoglobin 11.4 (L) 12.0 - 16.0 g/dL    Hematocrit 33.3 (L) 37.0 - 47.0 %    MCV 94.3 81.4 - 97.8 fL    MCH 32.3 27.0 - 33.0 pg    MCHC 34.2 33.6 - 35.0 g/dL    RDW 43.7 35.9 - 50.0 fL    Platelet Count 182 164 - 446 K/uL    MPV 11.5 9.0 - 12.9 fL    Neutrophils-Polys 76.50 (H) 44.00 - 72.00 %    Lymphocytes 15.60 (L) 22.00 - 41.00 %    Monocytes 5.00 0.00 - 13.40 %    Eosinophils 1.20 0.00 - 6.90 %    Basophils 0.40 0.00 - 1.80 %    Immature  Granulocytes 1.30 (H) 0.00 - 0.90 %    Nucleated RBC 0.00 /100 WBC    Neutrophils (Absolute) 7.78 (H) 2.00 - 7.15 K/uL    Lymphs (Absolute) 1.59 1.00 - 4.80 K/uL    Monos (Absolute) 0.51 0.00 - 0.85 K/uL    Eos (Absolute) 0.12 0.00 - 0.51 K/uL    Baso (Absolute) 0.04 0.00 - 0.12 K/uL    Immature Granulocytes (abs) 0.13 (H) 0.00 - 0.11 K/uL    NRBC (Absolute) 0.00 K/uL         Assessment:   Gestational Age:   38w0d  Risk Factors:   IUGR  Labor State:    Prolonged latent labor.  Pregnancy Complications:   Plan:    Continue present management    Patient Active Problem List    Diagnosis Date Noted   • IUGR (intrauterine growth restriction) affecting care of mother 04/12/2017   • SGA (small for gestational age) 03/23/2017   • Multiple sclerosis affecting pregnancy (CMS-HCC) 12/01/2016   • PSVT (paroxysmal supraventricular tachycardia) (CMS-HCC) 12/01/2016   • Constipation due to neurogenic bowel 12/01/2016       Tarah Jesus M.D.

## 2017-04-13 NOTE — DELIVERY NOTE
Vaginal Delivery Note    Rhea Baum is a  2, now para 1, who presented not in labor at 38w1d.  Patient progressed in active labor spontaneously to cervical exam 100%; cervical dilation 10; station +2 to complete the first stage of labor.  Patient then progressed through second stage and delivered spontaneously a viable female infant over a second lacerated perineum.  Nuchal cord present.  Infant Apgar 8 and 9, and weight pending.    During the third stage the placenta was delivered spontaneously and was intact with 3 vessels    Assisted extraction:  none    Perineum repair:  laceration repaired with 2-0 Chromic    Analgesia: epidural    Epidural:  yes    Family support:  yes    Infant bonding:  excellent    Estimated blood loss:  300 mL    Sponge count correct:  yes    Patient tolerated the procedure:  excellent

## 2017-04-13 NOTE — PROGRESS NOTES
L&D Progress Note    Name:   Rhea Baum   Date/Time:  4/13/2017 3:57 AM  Gestational Age:  38w1d  Admit Date:   4/12/2017  Admitting Dx:   IOL present in anterior chamber [Z96.1]  IUGR (intrauterine growth restriction) affecting care of mother, third trimester, fetus 3 [O36.5933]    Subjective:  Uterine contractions: yes  Pain:    no  Complaints:   no   Headache: .  no  Blurred vision:  no   SOB:    no   Nausea/vomiting:  no  Epigastric pain:  no  Fetal movement:  normal  Vaginal bleeding: . no    Objective:   Filed Vitals:    04/13/17 0300 04/13/17 0315 04/13/17 0331 04/13/17 0340   BP: 110/61 112/62 106/66    Pulse: 63 68 69 71   Temp:    36.3 °C (97.4 °F)   Resp:       Height:       Weight:       SpO2: 99% 100%       Fetal heart variability: moderate  Fetal Heart Rate decelerations: none  Fetal Heart Rate accelerations: yes  Uterine contractions: regular, every 4 minutes  Cervical: 75% ;  Dilatation .5 ; Station negative1    Fetal position:   Cephalic  Membranes ruptured: yes  AROM:  yes  Meconium: .  no    IUPC: .   yes  FSE .   no    Meds:   Epidural : .  yes  Magnesium sulfate: . no  Pitocin: .  yes    Labs:  Recent Results (from the past 72 hour(s))   Hold Blood Bank Specimen (Not Tested)    Collection Time: 04/12/17 10:00 AM   Result Value Ref Range    Holding Tube - Bb DONE    CBC WITH DIFFERENTIAL    Collection Time: 04/12/17 10:00 AM   Result Value Ref Range    WBC 10.2 4.8 - 10.8 K/uL    RBC 3.53 (L) 4.20 - 5.40 M/uL    Hemoglobin 11.4 (L) 12.0 - 16.0 g/dL    Hematocrit 33.3 (L) 37.0 - 47.0 %    MCV 94.3 81.4 - 97.8 fL    MCH 32.3 27.0 - 33.0 pg    MCHC 34.2 33.6 - 35.0 g/dL    RDW 43.7 35.9 - 50.0 fL    Platelet Count 182 164 - 446 K/uL    MPV 11.5 9.0 - 12.9 fL    Neutrophils-Polys 76.50 (H) 44.00 - 72.00 %    Lymphocytes 15.60 (L) 22.00 - 41.00 %    Monocytes 5.00 0.00 - 13.40 %    Eosinophils 1.20 0.00 - 6.90 %    Basophils 0.40 0.00 - 1.80 %    Immature Granulocytes 1.30 (H) 0.00 - 0.90 %     Nucleated RBC 0.00 /100 WBC    Neutrophils (Absolute) 7.78 (H) 2.00 - 7.15 K/uL    Lymphs (Absolute) 1.59 1.00 - 4.80 K/uL    Monos (Absolute) 0.51 0.00 - 0.85 K/uL    Eos (Absolute) 0.12 0.00 - 0.51 K/uL    Baso (Absolute) 0.04 0.00 - 0.12 K/uL    Immature Granulocytes (abs) 0.13 (H) 0.00 - 0.11 K/uL    NRBC (Absolute) 0.00 K/uL         Assessment:   Gestational Age:   38w1d  Risk Factors:   IUGR  Labor State:    Active phase labor.  Pregnancy Complications:   Plan:    Continue present management    Patient Active Problem List    Diagnosis Date Noted   • IUGR (intrauterine growth restriction) affecting care of mother 04/12/2017   • SGA (small for gestational age) 03/23/2017   • Multiple sclerosis affecting pregnancy (CMS-HCC) 12/01/2016   • PSVT (paroxysmal supraventricular tachycardia) (CMS-HCC) 12/01/2016   • Constipation due to neurogenic bowel 12/01/2016       Tarah Jesus M.D.

## 2017-04-14 VITALS
BODY MASS INDEX: 20.33 KG/M2 | OXYGEN SATURATION: 99 % | WEIGHT: 122 LBS | SYSTOLIC BLOOD PRESSURE: 94 MMHG | RESPIRATION RATE: 16 BRPM | HEART RATE: 76 BPM | DIASTOLIC BLOOD PRESSURE: 66 MMHG | TEMPERATURE: 98.2 F | HEIGHT: 65 IN

## 2017-04-14 LAB
ERYTHROCYTE [DISTWIDTH] IN BLOOD BY AUTOMATED COUNT: 44.1 FL (ref 35.9–50)
HCT VFR BLD AUTO: 27.3 % (ref 37–47)
HGB BLD-MCNC: 9.1 G/DL (ref 12–16)
MCH RBC QN AUTO: 32.4 PG (ref 27–33)
MCHC RBC AUTO-ENTMCNC: 33.3 G/DL (ref 33.6–35)
MCV RBC AUTO: 97.2 FL (ref 81.4–97.8)
PLATELET # BLD AUTO: 141 K/UL (ref 164–446)
PMV BLD AUTO: 11.9 FL (ref 9–12.9)
RBC # BLD AUTO: 2.81 M/UL (ref 4.2–5.4)
WBC # BLD AUTO: 11.3 K/UL (ref 4.8–10.8)

## 2017-04-14 PROCEDURE — 700102 HCHG RX REV CODE 250 W/ 637 OVERRIDE(OP): Performed by: OBSTETRICS & GYNECOLOGY

## 2017-04-14 PROCEDURE — 36415 COLL VENOUS BLD VENIPUNCTURE: CPT

## 2017-04-14 PROCEDURE — A9270 NON-COVERED ITEM OR SERVICE: HCPCS | Performed by: OBSTETRICS & GYNECOLOGY

## 2017-04-14 PROCEDURE — 85027 COMPLETE CBC AUTOMATED: CPT

## 2017-04-14 RX ORDER — IBUPROFEN 600 MG/1
600 TABLET ORAL EVERY 6 HOURS PRN
Qty: 30 TAB | Refills: 1 | Status: SHIPPED | OUTPATIENT
Start: 2017-04-14 | End: 2020-09-23

## 2017-04-14 RX ADMIN — IBUPROFEN 600 MG: 600 TABLET, FILM COATED ORAL at 01:29

## 2017-04-14 RX ADMIN — Medication 1 TABLET: at 08:07

## 2017-04-14 RX ADMIN — OXYCODONE HYDROCHLORIDE AND ACETAMINOPHEN 1 TABLET: 5; 325 TABLET ORAL at 01:29

## 2017-04-14 RX ADMIN — IBUPROFEN 600 MG: 600 TABLET, FILM COATED ORAL at 08:07

## 2017-04-14 RX ADMIN — OXYCODONE HYDROCHLORIDE AND ACETAMINOPHEN 1 TABLET: 5; 325 TABLET ORAL at 08:07

## 2017-04-14 ASSESSMENT — PAIN SCALES - GENERAL
PAINLEVEL_OUTOF10: 0
PAINLEVEL_OUTOF10: 2
PAINLEVEL_OUTOF10: 7
PAINLEVEL_OUTOF10: 2
PAINLEVEL_OUTOF10: 3

## 2017-04-14 NOTE — PROGRESS NOTES
Pt care assumed and assessment completed, WNL.  Medicated pain per MAR.  FOB at bedside.  Patient BF and pumping.  Lactation consultant in to see family to assist.  Q2 rounds in place

## 2017-04-14 NOTE — CARE PLAN
Problem: Altered physiologic condition related to immediate post-delivery state and potential for bleeding/hemorrhage  Goal: Patient physiologically stable as evidenced by normal lochia, palpable uterine involution and vital signs within normal limits  Outcome: PROGRESSING AS EXPECTED  Fundus firm; lochia scant rubra.     Problem: Potential for postpartum infection related to presence of episiotomy/vaginal tear and/or uterine contamination  Goal: Patient will be absent from signs and symptoms of infection  Outcome: PROGRESSING AS EXPECTED  Patient afebrile.

## 2017-04-14 NOTE — PROGRESS NOTES
Report received at 0700. Assessment completed: VSS. Patient ambulates by self. Fundus firm, lochia scant. Patient states pain 3/10; medicated per MAR. Patient would like medication offered when available. Bed in lowest locked position. Call light in place. All questions and concerns addressed. Will continue to monitor.

## 2017-04-14 NOTE — PROGRESS NOTES
Patient off unit with infant and spouse and hospital escort at 1409. Discharge instructions reviewed and signed; copy given to patient and placed in chart. F/U appointment discussed. Patient verbalizes understanding.

## 2017-04-14 NOTE — DISCHARGE INSTRUCTIONS
POSTPARTUM DISCHARGE INSTRUCTIONS FOR MOM    YOB: 1985   Age: 31 y.o.               Admit Date: 4/12/2017     Discharge Date: 4/14/2017  Attending Doctor:  Joelle Stallings M.D.                  Allergies:  Review of patient's allergies indicates no known allergies.    Discharged to home by car. Discharged via wheelchair, hospital escort: Yes.  Special equipment needed: Not Applicable  Belongings with: Personal  Be sure to schedule a follow-up appointment with your primary care doctor or any specialists as instructed.     Discharge Plan:   Diet Plan: Discussed  Activity Level: Discussed  Confirmed Follow up Appointment: Patient to Call and Schedule Appointment  Confirmed Symptoms Management: Discussed  Medication Reconciliation Updated: Yes    REASONS TO CALL YOUR OBSTETRICIAN:  1.   Persistent fever or shaking chills (Temperature higher than 100.4)  2.   Heavy bleeding (soaking more than 1 pad per hour); Passing clots  3.   Foul odor from vagina  4.   Mastitis (Breast infection; breast pain, chills, fever, redness)  5.   Urinary pain, burning or frequency  6.   Episiotomy infection    8.   Severe depression longer than 24 hours    HAND WASHING  · Prior to handling the baby.  · Before breastfeeding or bottle feeding baby.  · After using the bathroom or changing the baby's diaper.    VAGINAL CARE  · Nothing inside vagina for 6 weeks: no sexual intercourse, tampons or douching.  · Bleeding may continue for 2-4 weeks.  Amount may vary.    · Call your physician for heavy bleeding which means soaking more than 1 pad per hour    BIRTH CONTROL  · It is possible to become pregnant at any time after delivery and while breastfeeding.  · Plan to discuss a method of birth control with your physician at your follow up visit. visit.    DIET AND ELIMINATION  · Eating more fiber (bran cereal, fruits, and vegetables) and drinking plenty of fluids will help to avoid constipation.  · Urinary frequency after childbirth is  "normal.    POSTPARTUM BLUES  During the first few days after birth, you may experience a sense of the \"blues\" which may include impatience, irritability or even crying.  These feeling come and go quickly.  However, as many as 1 in 10 women experience emotional symptoms known as postpartum depression.    Postpartum depression:  May start as early as the second or third day after delivery or take several weeks or months to develop.  Symptoms of \"blues\" are present, but are more intense:  Crying spells; loss of appetite; feelings of hopelessness or loss of control; fear of touching the baby; over concern or no concern at all about the baby; little or no concern about your own appearance/caring for yourself; and/or inability to sleep or excessive sleeping.  Contact your physician if you are experiencing any of these symptoms.    Crisis Hotline:  · [place] Crisis Hotline:  3-168-ITSXHUR  Or 1-663.509.6329  · [place] Crisis Hotline:  1-839.433.2020  Or 932-109-8402    PREVENTING SHAKEN BABY:  If you are angry or stressed, PUT THE BABY IN THE CRIB, step away, take some deep breaths, and wait until you are calm to care for the baby.  DO NOT SHAKE THE BABY.  You are not alone, call a supporter for help.    · Crisis Call Center 24/7 crisis line 278-501-2036 or 1-620.212.2773  · You can also text them, text \"ANSWER\" to 902792    QUIT SMOKING/TOBACCO USE:  I understand the use of any tobacco products increases my chance of suffering from future heart disease and could cause other illnesses which may shorten my life. Quitting the use of tobacco products is the single most important thing I can do to improve my health. For further information on smoking / tobacco cessation call a Toll Free Quit Line at 1-780.975.9936 (*[employer] [place]) or 1-174.970.2836 (American Lung Association) or you can access the web based program at www.lungusa.org.    · [place] Tobacco Users Help Line:  (867) 119-3972       Toll Free: " 2-198-535-6205  · Quit Tobacco Program [facility] (928)399-3264    DEPRESSION / SUICIDE RISK:  As you are discharged from this [facility], it is important to learn how to keep safe from harming yourself.    Recognize the warning signs:  · Abrupt changes in personality, positive or negative- including increase in energy   · Giving away possessions  · Change in eating patterns- significant weight changes-  positive or negative  · Change in sleeping patterns- unable to sleep or sleeping all the time   · Unwillingness or inability to communicate  · Depression  · Unusual sadness, discouragement and loneliness  · Talk of wanting to die  · Neglect of personal appearance   · Rebelliousness- reckless behavior  · Withdrawal from people/activities they love  · Confusion- inability to concentrate     If you or a loved one observes any of these behaviors or has concerns about self-harm, here's what you can do:  · Talk about it- your feelings and reasons for harming yourself  · Remove any means that you might use to hurt yourself (examples: pills, rope, extension cords, firearm)  · Get professional help from the community (Mental Health, Substance Abuse, psychological counseling)  · Do not be alone:Call your Safe Contact- someone whom you trust who will be there for you.  · Call your local CRISIS HOTLINE 928-6624 or 449-671-6422  · Call your local Children's Mobile Crisis Response Team [place] (625) 085-9928 or www.[place]  · Call the toll free National Suicide Prevention Hotlines   · National Suicide Prevention Lifeline 125-813-BBWO (4088)  · National Hope Line Network 800-SUICIDE (572-7544)    DISCHARGE SURVEY:  Thank you for choosing [facility].  We hope we provided you with very good care.  You may be receiving a survey in the mail.  Please fill it out.  Your opinion is valuable to us.    ADDITIONAL EDUCATIONAL MATERIALS GIVEN TO PATIENT:        My signature on this form  indicates that:  1.  I have reviewed and understand the above information  2.  My questions regarding this information have been answered to my satisfaction.  3.  I have formulated a plan with my discharge nurse to obtain my prescribed medication for home.

## 2017-05-18 ENCOUNTER — POST PARTUM (OUTPATIENT)
Dept: OBGYN | Facility: CLINIC | Age: 32
End: 2017-05-18
Payer: COMMERCIAL

## 2017-05-18 VITALS — SYSTOLIC BLOOD PRESSURE: 112 MMHG | DIASTOLIC BLOOD PRESSURE: 62 MMHG | WEIGHT: 110 LBS | BODY MASS INDEX: 18.31 KG/M2

## 2017-05-18 PROCEDURE — 90050 PR POSTPARTUM VISIT: CPT | Performed by: OBSTETRICS & GYNECOLOGY

## 2017-05-18 RX ORDER — ACETAMINOPHEN AND CODEINE PHOSPHATE 120; 12 MG/5ML; MG/5ML
1 SOLUTION ORAL DAILY
Qty: 28 TAB | Refills: 12 | Status: SHIPPED | OUTPATIENT
Start: 2017-05-18 | End: 2018-08-24

## 2017-05-18 NOTE — PROGRESS NOTES
Rhea Baum Is a 31 y.o.  status post  on 17. Patient is here today for a postpartum check. Currently the patient is without complaints.  She reports Normal  BM.  Normal  appetite without nausea and vomiting. She denies fever. Pain is under good control. Breast feeding.  No vaginal bleeding.  Eating well, MS symptoms under good control.    /62 mmHg  Wt 49.896 kg (110 lb)  LMP 2016  Breastfeeding? Yesision GENERAL: Alert, in no apparent distress  Appropriate affect, intact insight and judgement.  ABDOMEN: Soft, nontender, nondistended.  No palpable masses.  No rebound or guarding.  No inguinal lymphadenopathy.  BACK: No CVA tenderness    GENITOURINARY:  Normal external genitalia, no lesions.  Normal urethral meatus, no masses or tenderness.  Normal bladder without fullness or masses.  Vagina well estrogenized, no vaginal discharge or lesions.  Cervix without lesions or discharge, nontender.  Uterus normal size, shape, and contour, nontender.  Adnexa nontender, no masses.  Normal anus and perineum.      Assessment and plan  Status post   No complications incision healing well  Micronor for contraception.   planning for vasectomy.

## 2017-05-18 NOTE — MR AVS SNAPSHOT
Rhea Baum   2017 9:00 AM   Post Partum   MRN: [id number]    Department:  [facility]   Dept Phone:  [phone]    Description:  Female : 1985   Provider:  Joelle Stallings M.D.           Reason for Visit     Postpartum care           Allergies as of 2017     No Known Allergies      You were diagnosed with     Postpartum care following vaginal delivery   [[id number]]         Vital Signs     Blood Pressure Weight Last Menstrual Period Breastfeeding? Smoking Status       112/62 mmHg 49.896 kg (110 lb) 2016 Yes Former Smoker       Basic Information     Date Of Birth Sex Race Ethnicity Preferred Language    1985 Female White Non- English      Your appointments     May 30, 2017  3:40 PM   FOLLOW UP with Olaf Umana M.D.   [facility] (--)    [address], [address]  [facility] [address]   [phone]              Problem List              ICD-10-CM Priority Class Noted - Resolved    Multiple sclerosis affecting pregnancy (CMS-HCC) O99.350, G35   2016 - Present    PSVT (paroxysmal supraventricular tachycardia) (CMS-HCC) I47.1   2016 - Present    Constipation due to neurogenic bowel K59.00   2016 - Present    SGA (small for gestational age) P05.00   3/23/2017 - Present    IUGR (intrauterine growth restriction) affecting care of mother O36.5990   2017 - Present      Health Maintenance        Date Due Completion Dates    PAP SMEAR 2019    IMM DTaP/Tdap/Td Vaccine (2 - Td) 2027            Current Immunizations     Influenza Vaccine Quad Inj (Pf) 10/6/2016  7:53 AM    Tdap Vaccine 2017  3:22 PM      Below and/or attached are the medications your provider expects you to take. Review all of your home medications and newly ordered medications with your provider and/or pharmacist. Follow medication instructions as directed by your provider and/or pharmacist. Please keep your medication  list with you and share with your provider. Update the information when medications are discontinued, doses are changed, or new medications (including over-the-counter products) are added; and carry medication information at all times in the event of emergency situations     Allergies:  No Known Allergies          Medications  Valid as of: May 18, 2017 -  9:25 AM    Generic Name Brand Name Tablet Size Instructions for use    Corn Dextrin   Take  by mouth.        Ibuprofen (Tab) MOTRIN 600 MG Take 1 Tab by mouth every 6 hours as needed for Moderate Pain.        Norethindrone (Tab) MICRONOR 0.35 MG Take 1 Tab by mouth every day.        Polyethylene Glycol 3350   Take  by mouth.        Prenatal Vit-Fe Fumarate-FA   Take  by mouth.        Probiotic Product   Take  by mouth.        .                 Medicines prescribed today were sent to:     [id number] - [place], [place] - [address] AT [address] [address]    Phone: [phone] Fax: [phone]    Open 24 Hours?: No      Medication refill instructions:       If your prescription bottle indicates you have medication refills left, it is not necessary to call your provider’s office. Please contact your pharmacy and they will refill your medication.    If your prescription bottle indicates you do not have any refills left, you may request refills at any time through one of the following ways: The online [employer] system (except Urgent Care), by calling your provider’s office, or by asking your pharmacy to contact your provider’s office with a refill request. Medication refills are processed only during regular business hours and may not be available until the next business day. Your provider may request additional information or to have a follow-up visit with you prior to refilling your medication.   *Please Note: Medication refills are assigned a new Rx number when refilled electronically. Your pharmacy may indicate  that no refills were authorized even though a new prescription for the same medication is available at the pharmacy. Please request the medicine by name with the pharmacy before contacting your provider for a refill.           [employer] Access Code: Activation code not generated  Current [employer] Status: Active

## 2018-08-07 ENCOUNTER — TELEPHONE (OUTPATIENT)
Dept: OBGYN | Facility: MEDICAL CENTER | Age: 33
End: 2018-08-07

## 2018-08-07 NOTE — TELEPHONE ENCOUNTER
Patient called and wants to know if she needs to come in, thinks she has a yeast infection. Not sure if she can get an RX

## 2018-08-24 ENCOUNTER — GYNECOLOGY VISIT (OUTPATIENT)
Dept: OBGYN | Facility: CLINIC | Age: 33
End: 2018-08-24
Payer: COMMERCIAL

## 2018-08-24 VITALS — BODY MASS INDEX: 18.14 KG/M2 | WEIGHT: 109 LBS | SYSTOLIC BLOOD PRESSURE: 102 MMHG | DIASTOLIC BLOOD PRESSURE: 64 MMHG

## 2018-08-24 DIAGNOSIS — Z30.011 ENCOUNTER FOR INITIAL PRESCRIPTION OF CONTRACEPTIVE PILLS: ICD-10-CM

## 2018-08-24 DIAGNOSIS — R45.86 MOOD CHANGES: ICD-10-CM

## 2018-08-24 PROCEDURE — 99213 OFFICE O/P EST LOW 20 MIN: CPT | Performed by: OBSTETRICS & GYNECOLOGY

## 2018-08-24 RX ORDER — NORETHINDRONE ACETATE AND ETHINYL ESTRADIOL AND FERROUS FUMARATE 1MG-20(24)
1 KIT ORAL DAILY
Qty: 28 TAB | Refills: 11 | Status: SHIPPED | OUTPATIENT
Start: 2018-08-24 | End: 2020-08-12

## 2018-08-24 ASSESSMENT — ENCOUNTER SYMPTOMS
MEMORY LOSS: 0
EYES NEGATIVE: 1
INSOMNIA: 0
HALLUCINATIONS: 0
MUSCULOSKELETAL NEGATIVE: 1
CARDIOVASCULAR NEGATIVE: 1
DEPRESSION: 0
RESPIRATORY NEGATIVE: 1
NEUROLOGICAL NEGATIVE: 1
NERVOUS/ANXIOUS: 1
GASTROINTESTINAL NEGATIVE: 1
CONSTITUTIONAL NEGATIVE: 1

## 2018-08-24 ASSESSMENT — LIFESTYLE VARIABLES: SUBSTANCE_ABUSE: 0

## 2018-08-24 NOTE — PROGRESS NOTES
Subjective:      Rhea Baum is a 32 y.o. female who presents to discuss mood changes on the Minipill and other forms of contraception          HPI patient is a 32-year-old para 1 who presents today to discuss switching to another form of OCP.  Patient states she is under minipill and has been experiencing some mood changes/labile moods without any suicidal or homicidal ideations.  Patient states she had finished breast-feeding.  She had used combination OCP previously.  Patient states she was also coming in for yeast infection but her symptoms have completely resolved.  Patient reports normal bowel and bladder functions.  She denies any headaches or shortness of breath.  Denies any chest pains.    Review of Systems   Constitutional: Negative.    HENT: Negative.    Eyes: Negative.    Respiratory: Negative.    Cardiovascular: Negative.    Gastrointestinal: Negative.    Genitourinary: Negative.    Musculoskeletal: Negative.    Skin: Negative.    Neurological: Negative.    Endo/Heme/Allergies: Negative.    Psychiatric/Behavioral: Negative for depression, hallucinations, memory loss, substance abuse and suicidal ideas. The patient is nervous/anxious. The patient does not have insomnia.           Objective:     /64   Wt 49.4 kg (109 lb)   LMP 08/13/2018   BMI 18.14 kg/m²      Physical Exam   Constitutional: She appears well-developed and well-nourished.   Psychiatric: She has a normal mood and affect. Her behavior is normal. Judgment and thought content normal.   Nursing note and vitals reviewed.       Discussion:  We discuss contraception options available including long-acting reversible forms of contraception.  We discussed OCP, patch, vaginal ring, implant options.  After extensive discussion, patient states she will like to try combination OCP and we discussed trial of low-dose OCP such as junel and patient agrees to try this OCP.  We discussed risks, benefits and potential complications with OCP use  including risk for DVT pulmonary embolism blood clots and stroke.  We discussed failure rates.  We discussed side effects we discussed need for STD prevention with barrier contraception.  All questions and concerns were addressed.  Patient desires to try this OCP after counseling.       Assessment/Plan:     1. Encounter for initial prescription of contraceptive pills  Patient counseled on OCP use.  Risks, benefits and potential complications were discussed and patient agrees to usage.  Prescription given  - Norethin Ace-Eth Estrad-FE 1-20 MG-MCG(24) Tab; Take 1 Tab by mouth every day.  Dispense: 28 Tab; Refill: 11    2. Mood changes (HCC)  Patient attributes more changes to her current OCP.  Patient states she is almost out of this OCP and desires to switch to a regular OCP.  Patient counseled mood changes precautions.  Precautions and plan of care were reviewed  Patient advised to call if symptoms does not improve after 2 months or earlier if she has any concerns.  20 minutes was spent with patient today.  All time was for counseling and coordination of care

## 2018-09-06 DIAGNOSIS — Z30.011 ENCOUNTER FOR INITIAL PRESCRIPTION OF CONTRACEPTIVE PILLS: ICD-10-CM

## 2018-09-06 RX ORDER — ACETAMINOPHEN AND CODEINE PHOSPHATE 120; 12 MG/5ML; MG/5ML
1 SOLUTION ORAL DAILY
Qty: 28 TAB | Refills: 12 | Status: SHIPPED | OUTPATIENT
Start: 2018-09-06 | End: 2020-08-12

## 2018-09-26 ENCOUNTER — OFFICE VISIT (OUTPATIENT)
Dept: NEUROLOGY | Facility: MEDICAL CENTER | Age: 33
End: 2018-09-26
Payer: COMMERCIAL

## 2018-09-26 VITALS
WEIGHT: 108 LBS | HEIGHT: 64 IN | OXYGEN SATURATION: 98 % | SYSTOLIC BLOOD PRESSURE: 108 MMHG | BODY MASS INDEX: 18.44 KG/M2 | HEART RATE: 68 BPM | DIASTOLIC BLOOD PRESSURE: 60 MMHG | TEMPERATURE: 98.7 F

## 2018-09-26 DIAGNOSIS — G35 MULTIPLE SCLEROSIS (HCC): ICD-10-CM

## 2018-09-26 DIAGNOSIS — F17.200 SMOKING: ICD-10-CM

## 2018-09-26 DIAGNOSIS — R53.83 FATIGUE, UNSPECIFIED TYPE: ICD-10-CM

## 2018-09-26 DIAGNOSIS — F41.9 ANXIETY: ICD-10-CM

## 2018-09-26 PROCEDURE — 99215 OFFICE O/P EST HI 40 MIN: CPT | Mod: 25 | Performed by: PSYCHIATRY & NEUROLOGY

## 2018-09-26 PROCEDURE — 99406 BEHAV CHNG SMOKING 3-10 MIN: CPT | Mod: 25 | Performed by: PSYCHIATRY & NEUROLOGY

## 2018-09-26 RX ORDER — VARENICLINE TARTRATE 1 MG/1
1 TABLET, FILM COATED ORAL 2 TIMES DAILY
Qty: 60 TAB | Refills: 3 | Status: SHIPPED | OUTPATIENT
Start: 2018-09-26 | End: 2020-08-12

## 2018-09-26 RX ORDER — DIAZEPAM 5 MG/1
5 TABLET ORAL
Qty: 1 TAB | Refills: 0 | Status: SHIPPED | OUTPATIENT
Start: 2018-09-26 | End: 2018-09-27

## 2018-09-26 ASSESSMENT — PATIENT HEALTH QUESTIONNAIRE - PHQ9: CLINICAL INTERPRETATION OF PHQ2 SCORE: 0

## 2018-09-26 NOTE — PROGRESS NOTES
CC: Multiple Sclerosis       HPI:    Rhea Baum is a 32 y.o. female who presents today in initial neurologic consultation for multiple sclerosis. She is being referred by their primary care provider PEGGY Robledo. She is accompanied by her twin sister to today's visit.     Ms. Baum was diagnosed with right eye optic neuritis in 2012. She had pain with eye movement and vision loss. At the time, she was admitted to [facility] in [place] where she received 3 days of IV steroids. Her vision has never fully recovered in her right eye and she has some limited vision in the right temporal area. She was daignosed with MS based on MRI imaging. She was subsequently started on Copaxone, but had unbearable side effects from the medication, had an infection with the flue, and so stopped it.     About 6 momths after her first attack, she had numbness in her legs. This was was treated with steroids. At a later point, she had numbness in her left leg as did her right abdomen. She also had a shingles infection on her left chest at one point as well.    After moving to [place], she was Dr. Neeraj Greer in 2016, but never followed up. She was 5.5 months pregnant at the time. She tells me this was a very difficult pregnancy. She was very nauseated, suffered from gastroparesis. She had a full GI workup including EGD and colonoscopy which were negative. She still has GI symptoms and her food is expelled undigested. She cannot seem to keep much weight on despite trying to add calories to her diet. In the past she did suffer from Bulimia.       MS History:  Diagnosed: 2012  Lumbar puncture:  No  First presentation: Right eye optic neuritis  Disease modifying treatment:    Current: None   Previous: Copaxone x 2 weeks in 2012, then went to [facility] or other neurologist: Hector Nick  Last MRI: 2012      ROS:   Constitutional: No fevers or chills. +fatigue  Eyes: No blurry vision or eye pain.  ENT: No dysphagia  or hearing loss.  Respiratory: No cough or shortness of breath.  Cardiovascular: No chest pain or palpitations.  GI: No nausea, vomiting, or diarrhea.  : No urinary incontinence or dysuria.  Musculoskeletal: No joint swelling or arthralgias.  Skin: No skin rashes.  Neuro: No headaches, dizziness, or tremors. +brain fog.   Endocrine: No heat or cold intolerance. No polydipsia or polyuria.  Psych: No depression or anxiety.  Heme/Lymph: No easy bruising or swollen lymph nodes.  All other review of systems were reviewed and were negative.     Past Medical History:   Past Medical History:   Diagnosis Date   • Anxiety    • GERD (gastroesophageal reflux disease)    • Multiple sclerosis during pregnancy (HCC)    • PSVT (paroxysmal supraventricular tachycardia) ([facility])        Past Surgical History:   Past Surgical History:   Procedure Laterality Date   • OTHER CARDIAC SURGERY      Age 8        Social History:   Social History     Social History   • Marital status:      Spouse name: N/A   • Number of children: N/A   • Years of education: N/A     Occupational History   • Not on file.     Social History Main Topics   • Smoking status: Current Every Day Smoker     Years: 0.75     Types: Cigarettes   • Smokeless tobacco: Never Used   • Alcohol use No   • Drug use: No   • Sexual activity: Yes     Partners: Male     Other Topics Concern   • Not on file     Social History Narrative   • No narrative on file       Family Hx:   Family History   Problem Relation Age of Onset   • Heart Disease Maternal Grandmother    • Diabetes Paternal Grandmother         Type 2   • No Known Problems Mother    • No Known Problems Sister    • No Known Problems Daughter        Current Medications:   Current Outpatient Prescriptions:   •  diazePAM (VALIUM) 5 MG Tab, Take 1 Tab by mouth 1 time daily as needed for Anxiety for up to 1 dose., Disp: 1 Tab, Rfl: 0  •  varenicline (CHANTIX STARTING MONTH PAK) 0.5 MG X 11 & 1 MG X 42 tablet, Generic okay.,  "Disp: 56 Tab, Rfl: 3  •  varenicline (CHANTIX CONTINUING MONTH IGNACIO) 1 MG tablet, Take 1 Tab by mouth 2 times a day., Disp: 60 Tab, Rfl: 3  •  Norethin Ace-Eth Estrad-FE 1-20 MG-MCG(24) Tab, Take 1 Tab by mouth every day., Disp: 28 Tab, Rfl: 11  •  Corn Dextrin (EQL FIBER SUPPLEMENT PO), Take  by mouth., Disp: , Rfl:   •  Polyethylene Glycol 3350 (MIRALAX PO), Take  by mouth., Disp: , Rfl:   •  norethindrone (MICRONOR) 0.35 MG tablet, Take 1 Tab by mouth every day., Disp: 28 Tab, Rfl: 12  •  ibuprofen (MOTRIN) 600 MG Tab, Take 1 Tab by mouth every 6 hours as needed for Moderate Pain., Disp: 30 Tab, Rfl: 1  •  Probiotic Product (PROBIOTIC & ACIDOPHILUS EX ST PO), Take  by mouth., Disp: , Rfl:   •  Prenatal Vit-Fe Fumarate-FA (PRENATAL VITAMIN PO), Take  by mouth., Disp: , Rfl:     Allergies: No Known Allergies      Physical Exam:    Ambulatory Vitals  Encounter Vitals  Temperature: 37.1 °C (98.7 °F)  Blood Pressure: 108/60  Pulse: 68  Pulse Oximetry: 98 %  Weight: 49 kg (108 lb)  Height: 162.6 cm (5' 4\")  BMI (Calculated): 18.54    Constitutional: Well-developed, well-nourished, good hygiene. Appears stated age.  Cardiovascular: RRR, with no murmurs, rubs or gallops. No carotid bruits. No peripheral edema, pedal pulses intact.   Respiratory: Lungs CTA B/L, no W/R/R.   Skin: Warm, dry, intact. No rashes observed.  Eyes: Sclera anicteric.   Funduscopic: Optic discs flat with no evidence of papilledema or pallor. Normal posterior segments.  Neurologic:   Mental Status: Awake, alert, oriented x 3.   Speech: Fluent with normal prosody.   Memory: Able to recall 3 words at 1 minute and 5 minutes. Able to recall recent and remote events accurately.    Concentration: Attentive. Able to focus on history and follow multi-step commands.   Fund of Knowledge: Appropriate.   Cranial Nerves:    CN II: PERRL. No afferent pupillary defect.    CN III, IV, VI: Good eye closure, EOMI.     CN V: Facial sensation intact and symmetric. "     CN VII: No facial asymmetry.     CN VIII: Hearing intact.     CN IX and X: Palate elevates symmetrically. Normal gag reflex.    CN XI: Symmetric shoulder shrug.     CN XII: Tongue midline.    Sensory: Intact light touch, vibration and temperature.    Coordination: No evidence of past-pointing on finger to nose testing, no dysdiadochokinesia. Heel to shin intact.    DTR's: 2+ throughout without clonus.    Babinski: Toes downgoing bilaterally.   Romberg: Negative.   Movements: No tremors observed.   Musculoskeletal:    Strength: 5/5 in upper and lower extremities bilaterally.   Gait: Steady, narrow based.    Tone: Normal bulk and tone.   Joints: No swelling.     Labs:  Results for FLORENCIO SINCLAIR (MRN [id number]) as of 9/26/2018 20:31   Ref. Range 4/14/2017 04:37   WBC Latest Ref Range: 4.8 - 10.8 K/uL 11.3 (H)   RBC Latest Ref Range: 4.20 - 5.40 M/uL 2.81 (L)   Hemoglobin Latest Ref Range: 12.0 - 16.0 g/dL 9.1 (L)   Hematocrit Latest Ref Range: 37.0 - 47.0 % 27.3 (L)   MCV Latest Ref Range: 81.4 - 97.8 fL 97.2   MCH Latest Ref Range: 27.0 - 33.0 pg 32.4   MCHC Latest Ref Range: 33.6 - 35.0 g/dL 33.3 (L)   RDW Latest Ref Range: 35.9 - 50.0 fL 44.1   Platelet Count Latest Ref Range: 164 - 446 K/uL 141 (L)   MPV Latest Ref Range: 9.0 - 12.9 fL 11.9       Assessment/Plan:  Multiple sclerosis (HCC)  33 yo F with h/o gastroparesis and ,multiple sclerosis diagnosed in 2012 after an episode of right eye optic neuritis based on MRI imaging. Her MRI images are not available for my review today, but she is agreeable to calling the [facility] in [place] and requesting the prior images. We will obtain new MRI's to see how things have evolved over the past 6 years. I would also like her to undergo lab testing in preparation for a discussion about possible treatments.     Plan:  1. MRI Brain, c- and t-spine w/wo contrast  2. Check labs including ARAM Virus antibody at [facility], VZV titer, Quantiferon Gold TB test, CMP,  CBC      Smoking  Today I counseled the patient as well as her twin sister on smoking cessation and its importance in mitigating MS. She has tried quitting using Wellbutrin before but had side effects, she also tried lozenges and gum without success. She is interested in trying Chantix. I agreed to Rx this for her and we discussed side effects mentioned in [facility] including SJS. She and her sister are going to try quitting together. Total time spent 7 minutes.     Plan:  1. Chantx Rx placed        Greater than 50% of this 60 minute face to face encounter was devoted to disease state counseling on Multiple Sclerosis and coordination of care. (see above assessment and plan for further details of discussion).         Isabel Estes D.O., M.P.H  MS specialist.   Board Certified Neurologist.  Neurology Clerkship Director, [facility].    Neurology,  [facility].   Tel: [phone]  Fax: [phone]

## 2018-09-27 NOTE — ASSESSMENT & PLAN NOTE
33 yo F with h/o gastroparesis and ,multiple sclerosis diagnosed in 2012 after an episode of right eye optic neuritis based on MRI imaging. Her MRI images are not available for my review today, but she is agreeable to calling the [facility] in [place] and requesting the prior images. We will obtain new MRI's to see how things have evolved over the past 6 years. I would also like her to undergo lab testing in preparation for a discussion about possible treatments.     Plan:  1. MRI Brain, c- and t-spine w/wo contrast  2. Check labs including ARAM Virus antibody at [facility], VZV titer, Quantiferon Gold TB test, CMP, CBC

## 2018-09-27 NOTE — ASSESSMENT & PLAN NOTE
Today I counseled the patient as well as her twin sister on smoking cessation and its importance in mitigating MS. She has tried quitting using Wellbutrin before but had side effects, she also tried lozenges and gum without success. She is interested in trying Chantix. I agreed to Rx this for her and we discussed side effects mentioned in [facility] including SJS. She and her sister are going to try quitting together. Total time spent 7 minutes.     Plan:  1. Chantx Rx placed

## 2018-10-02 ENCOUNTER — HOSPITAL ENCOUNTER (OUTPATIENT)
Dept: LAB | Facility: MEDICAL CENTER | Age: 33
End: 2018-10-02
Attending: PSYCHIATRY & NEUROLOGY
Payer: COMMERCIAL

## 2018-10-02 DIAGNOSIS — G35 MULTIPLE SCLEROSIS (HCC): ICD-10-CM

## 2018-10-02 DIAGNOSIS — R53.83 FATIGUE, UNSPECIFIED TYPE: ICD-10-CM

## 2018-10-02 LAB
ALBUMIN SERPL BCP-MCNC: 4.4 G/DL (ref 3.2–4.9)
ALBUMIN/GLOB SERPL: 1.6 G/DL
ALP SERPL-CCNC: 47 U/L (ref 30–99)
ALT SERPL-CCNC: 15 U/L (ref 2–50)
ANION GAP SERPL CALC-SCNC: 7 MMOL/L (ref 0–11.9)
AST SERPL-CCNC: 12 U/L (ref 12–45)
BASOPHILS # BLD AUTO: 0.8 % (ref 0–1.8)
BASOPHILS # BLD: 0.06 K/UL (ref 0–0.12)
BILIRUB SERPL-MCNC: 1.2 MG/DL (ref 0.1–1.5)
BUN SERPL-MCNC: 10 MG/DL (ref 8–22)
CALCIUM SERPL-MCNC: 9.5 MG/DL (ref 8.5–10.5)
CHLORIDE SERPL-SCNC: 108 MMOL/L (ref 96–112)
CO2 SERPL-SCNC: 24 MMOL/L (ref 20–33)
CREAT SERPL-MCNC: 0.69 MG/DL (ref 0.5–1.4)
EOSINOPHIL # BLD AUTO: 0.24 K/UL (ref 0–0.51)
EOSINOPHIL NFR BLD: 3 % (ref 0–6.9)
ERYTHROCYTE [DISTWIDTH] IN BLOOD BY AUTOMATED COUNT: 44.1 FL (ref 35.9–50)
GLOBULIN SER CALC-MCNC: 2.7 G/DL (ref 1.9–3.5)
GLUCOSE SERPL-MCNC: 87 MG/DL (ref 65–99)
HAV IGM SERPL QL IA: NEGATIVE
HBV CORE IGM SER QL: NEGATIVE
HBV SURFACE AG SER QL: NEGATIVE
HCT VFR BLD AUTO: 41.6 % (ref 37–47)
HCV AB SER QL: NEGATIVE
HGB BLD-MCNC: 14.1 G/DL (ref 12–16)
IMM GRANULOCYTES # BLD AUTO: 0.02 K/UL (ref 0–0.11)
IMM GRANULOCYTES NFR BLD AUTO: 0.3 % (ref 0–0.9)
LYMPHOCYTES # BLD AUTO: 2.46 K/UL (ref 1–4.8)
LYMPHOCYTES NFR BLD: 30.9 % (ref 22–41)
MCH RBC QN AUTO: 32.4 PG (ref 27–33)
MCHC RBC AUTO-ENTMCNC: 33.9 G/DL (ref 33.6–35)
MCV RBC AUTO: 95.6 FL (ref 81.4–97.8)
MONOCYTES # BLD AUTO: 0.47 K/UL (ref 0–0.85)
MONOCYTES NFR BLD AUTO: 5.9 % (ref 0–13.4)
NEUTROPHILS # BLD AUTO: 4.72 K/UL (ref 2–7.15)
NEUTROPHILS NFR BLD: 59.1 % (ref 44–72)
NRBC # BLD AUTO: 0 K/UL
NRBC BLD-RTO: 0 /100 WBC
PLATELET # BLD AUTO: 279 K/UL (ref 164–446)
PMV BLD AUTO: 11.3 FL (ref 9–12.9)
POTASSIUM SERPL-SCNC: 4.2 MMOL/L (ref 3.6–5.5)
PROT SERPL-MCNC: 7.1 G/DL (ref 6–8.2)
RBC # BLD AUTO: 4.35 M/UL (ref 4.2–5.4)
SODIUM SERPL-SCNC: 139 MMOL/L (ref 135–145)
WBC # BLD AUTO: 8 K/UL (ref 4.8–10.8)

## 2018-10-02 PROCEDURE — 80074 ACUTE HEPATITIS PANEL: CPT

## 2018-10-02 PROCEDURE — 86480 TB TEST CELL IMMUN MEASURE: CPT

## 2018-10-02 PROCEDURE — 80053 COMPREHEN METABOLIC PANEL: CPT

## 2018-10-02 PROCEDURE — 36415 COLL VENOUS BLD VENIPUNCTURE: CPT

## 2018-10-02 PROCEDURE — 85025 COMPLETE CBC W/AUTO DIFF WBC: CPT

## 2018-10-02 PROCEDURE — 86787 VARICELLA-ZOSTER ANTIBODY: CPT

## 2018-10-03 LAB
M TB TUBERC IFN-G BLD QL: NEGATIVE
M TB TUBERC IFN-G/MITOGEN IGNF BLD: -0
M TB TUBERC IGNF/MITOGEN IGNF CONTROL: 43.65 [IU]/ML
MITOGEN IGNF BCKGRD COR BLD-ACNC: 0.01 [IU]/ML
VZV IGG SER IA-ACNC: 3.18

## 2018-10-12 ENCOUNTER — HOSPITAL ENCOUNTER (OUTPATIENT)
Dept: RADIOLOGY | Facility: MEDICAL CENTER | Age: 33
End: 2018-10-12
Attending: PSYCHIATRY & NEUROLOGY
Payer: COMMERCIAL

## 2018-10-12 DIAGNOSIS — G35 MULTIPLE SCLEROSIS (HCC): ICD-10-CM

## 2018-10-12 PROCEDURE — 70553 MRI BRAIN STEM W/O & W/DYE: CPT

## 2018-10-12 PROCEDURE — 72156 MRI NECK SPINE W/O & W/DYE: CPT

## 2018-10-12 PROCEDURE — 72157 MRI CHEST SPINE W/O & W/DYE: CPT

## 2018-10-12 PROCEDURE — 700117 HCHG RX CONTRAST REV CODE 255: Performed by: PSYCHIATRY & NEUROLOGY

## 2018-10-12 PROCEDURE — A9585 GADOBUTROL INJECTION: HCPCS | Performed by: PSYCHIATRY & NEUROLOGY

## 2018-10-12 RX ORDER — GADOBUTROL 604.72 MG/ML
5 INJECTION INTRAVENOUS ONCE
Status: COMPLETED | OUTPATIENT
Start: 2018-10-12 | End: 2018-10-12

## 2018-10-12 RX ADMIN — GADOBUTROL 5 ML: 604.72 INJECTION INTRAVENOUS at 09:20

## 2018-11-26 ENCOUNTER — OFFICE VISIT (OUTPATIENT)
Dept: NEUROLOGY | Facility: MEDICAL CENTER | Age: 33
End: 2018-11-26
Payer: COMMERCIAL

## 2018-11-26 VITALS
OXYGEN SATURATION: 94 % | WEIGHT: 110 LBS | TEMPERATURE: 97.9 F | DIASTOLIC BLOOD PRESSURE: 66 MMHG | HEIGHT: 64 IN | SYSTOLIC BLOOD PRESSURE: 100 MMHG | HEART RATE: 70 BPM | RESPIRATION RATE: 16 BRPM | BODY MASS INDEX: 18.78 KG/M2

## 2018-11-26 DIAGNOSIS — F41.9 ANXIETY: ICD-10-CM

## 2018-11-26 DIAGNOSIS — G35 MULTIPLE SCLEROSIS (HCC): ICD-10-CM

## 2018-11-26 PROCEDURE — 99215 OFFICE O/P EST HI 40 MIN: CPT | Performed by: PSYCHIATRY & NEUROLOGY

## 2018-11-26 NOTE — PROGRESS NOTES
CC: Multiple Sclerosis       HPI:    Rhea Baum is a 33 y.o. female who presents today in neurologic follow up for multiple sclerosis. She is accompanied by her  to today's visit. She was last seen on 9/26/18. Since her last visit, she has not developed any new symptoms of MS relapse, but continues to experience significant fatigue and problems with her balance. 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 She had new MRI imaging since her last visit which showed active disease in the brain and the spinal cord. She continues to have ongoing GI issues with diarrhea and constipation. She also mentions that her anxiety has been much worse recently as well to the point where she had a panic attack over Thanksgiving.     MS History:  Diagnosed: 2012  Lumbar puncture:  No  First presentation: Right eye optic neuritis  Disease modifying treatment:               Current: None              Previous: Copaxone x 2 weeks in 2012, then went to [facility] or other neurologist: Hector Nick  Last MRI: 10/12/18  ARAM Virus antibody: 10/2/18 negative      ROS:   Constitutional: No fevers or chills.  Eyes: No blurry vision or eye pain.  ENT: No dysphagia or hearing loss.  Respiratory: No cough or shortness of breath.  Cardiovascular: No chest pain or palpitations.  GI: No nausea, vomiting, or diarrhea.  : No urinary incontinence or dysuria.  Musculoskeletal: No joint swelling or arthralgias.  Skin: No skin rashes.  Neuro: No headaches, dizziness, or tremors.  Endocrine: No heat or cold intolerance. No polydipsia or polyuria.  Psych: No depression or anxiety.  Heme/Lymph: No easy bruising or swollen lymph nodes.  All other review of systems were reviewed and were negative.     Past Medical History:   Past Medical History:   Diagnosis Date   • Anxiety    • GERD (gastroesophageal reflux disease)    • Multiple sclerosis during pregnancy (HCC)    • PSVT (paroxysmal supraventricular tachycardia) ([facility])         Past Surgical History:   Past Surgical History:   Procedure Laterality Date   • OTHER CARDIAC SURGERY      Age 8        Social History:   Social History     Social History   • Marital status:      Spouse name: N/A   • Number of children: N/A   • Years of education: N/A     Occupational History   • Not on file.     Social History Main Topics   • Smoking status: Current Every Day Smoker     Packs/day: 0.50     Years: 0.75     Types: Cigarettes   • Smokeless tobacco: Never Used   • Alcohol use No   • Drug use: No   • Sexual activity: Yes     Partners: Male     Other Topics Concern   • Not on file     Social History Narrative   • No narrative on file       Family Hx:   Family History   Problem Relation Age of Onset   • Heart Disease Maternal Grandmother    • Diabetes Paternal Grandmother         Type 2   • No Known Problems Mother    • No Known Problems Sister    • No Known Problems Daughter        Current Medications:   Current Outpatient Prescriptions:   •  norethindrone (MICRONOR) 0.35 MG tablet, Take 1 Tab by mouth every day., Disp: 28 Tab, Rfl: 12  •  ibuprofen (MOTRIN) 600 MG Tab, Take 1 Tab by mouth every 6 hours as needed for Moderate Pain., Disp: 30 Tab, Rfl: 1  •  Corn Dextrin (EQL FIBER SUPPLEMENT PO), Take  by mouth., Disp: , Rfl:   •  Polyethylene Glycol 3350 (MIRALAX PO), Take  by mouth., Disp: , Rfl:   •  varenicline (CHANTIX STARTING MONTH PAK) 0.5 MG X 11 & 1 MG X 42 tablet, Generic okay. (Patient not taking: Reported on 11/26/2018), Disp: 56 Tab, Rfl: 3  •  varenicline (CHANTIX CONTINUING MONTH PAK) 1 MG tablet, Take 1 Tab by mouth 2 times a day. (Patient not taking: Reported on 11/26/2018), Disp: 60 Tab, Rfl: 3  •  Norethin Ace-Eth Estrad-FE 1-20 MG-MCG(24) Tab, Take 1 Tab by mouth every day. (Patient not taking: Reported on 11/26/2018), Disp: 28 Tab, Rfl: 11  •  Probiotic Product (PROBIOTIC & ACIDOPHILUS EX ST PO), Take  by mouth., Disp: , Rfl:   •  Prenatal Vit-Fe Fumarate-FA (PRENATAL  "VITAMIN PO), Take  by mouth., Disp: , Rfl:     Allergies: No Known Allergies      Physical Exam:   Ambulatory Vitals  Encounter Vitals  Temperature: 36.6 °C (97.9 °F)  Temp src: Temporal  Blood Pressure: 100/66  Pulse: 70  Respiration: 16  Pulse Oximetry: 94 %  Weight: 49.9 kg (110 lb)  Height: 162.6 cm (5' 4\")  BMI (Calculated): 18.88    Constitutional: Well-developed, well-nourished, good hygiene. Appears stated age.  Cardiovascular: RRR, with no murmurs, rubs or gallops. No carotid bruits. No peripheral edema, pedal pulses intact.   Respiratory: Lungs CTA B/L, no W/R/R.   Skin: Warm, dry, intact. No rashes observed.  Eyes: Sclera anicteric.  Neurologic:   Mental Status: Awake, alert, oriented x 3.   Speech: Fluent with normal prosody.   Memory: Able to recall recent and remote events accurately.    Concentration: Attentive. Able to focus on history and follow multi-step commands.   Fund of Knowledge: Appropriate.   Cranial Nerves:    CN II: PERRL. No afferent pupillary defect.    CN III, IV, VI: Good eye closure, EOMI.     CN V: Facial sensation intact and symmetric.     CN VII: No facial asymmetry.     CN VIII: Hearing intact.     CN IX and X: Palate elevates symmetrically. Normal gag reflex.    CN XI: Symmetric shoulder shrug.     CN XII: Tongue midline.    Sensory: Intact light touch, vibration and temperature.    Coordination: No evidence of past-pointing on finger to nose testing, no dysdiadochokinesia. Heel to shin intact.    DTR's: 2+ throughout without clonus.    Babinski: Toes downgoing bilaterally.   Romberg: Negative.   Movements: No tremors observed.   Musculoskeletal:    Strength: 5/5 in upper and lower extremities bilaterally.   Gait: Steady, narrow based.    Tone: Normal bulk and tone.   Joints: No swelling.     Labs:  Results for FLORENCIO SINCLAIR (MRN [id number]) as of 11/25/2018 22:38   Ref. Range 10/2/2018 08:04   Hepatitis A Virus Ab, IgM Latest Ref Range: Negative  Negative   Hepatitis B Surface " Antigen Latest Ref Range: Negative  Negative   Hepatitis B Cors Ab,IgM Latest Ref Range: Negative  Negative   Hepatitis C Antibody Latest Ref Range: Negative  Negative   Mitogen-Nil Unknown 43.652   Nil Unknown 0.015   Quantiferon TB Gold Latest Ref Range: Negative  Negative   TB Ag-Nil Unknown -0.003   Varicella Zoster IgG Ab Unknown 3.18     Results for FLORENCIO SINCLAIR (MRN [id number]) as of 11/25/2018 22:38   Ref. Range 10/2/2018 08:04   WBC Latest Ref Range: 4.8 - 10.8 K/uL 8.0   RBC Latest Ref Range: 4.20 - 5.40 M/uL 4.35   Hemoglobin Latest Ref Range: 12.0 - 16.0 g/dL 14.1   Hematocrit Latest Ref Range: 37.0 - 47.0 % 41.6   MCV Latest Ref Range: 81.4 - 97.8 fL 95.6   MCH Latest Ref Range: 27.0 - 33.0 pg 32.4   MCHC Latest Ref Range: 33.6 - 35.0 g/dL 33.9   RDW Latest Ref Range: 35.9 - 50.0 fL 44.1   Platelet Count Latest Ref Range: 164 - 446 K/uL 279   MPV Latest Ref Range: 9.0 - 12.9 fL 11.3   Neutrophils-Polys Latest Ref Range: 44.00 - 72.00 % 59.10   Neutrophils (Absolute) Latest Ref Range: 2.00 - 7.15 K/uL 4.72   Lymphocytes Latest Ref Range: 22.00 - 41.00 % 30.90   Lymphs (Absolute) Latest Ref Range: 1.00 - 4.80 K/uL 2.46   Monocytes Latest Ref Range: 0.00 - 13.40 % 5.90   Monos (Absolute) Latest Ref Range: 0.00 - 0.85 K/uL 0.47   Eosinophils Latest Ref Range: 0.00 - 6.90 % 3.00   Eos (Absolute) Latest Ref Range: 0.00 - 0.51 K/uL 0.24   Basophils Latest Ref Range: 0.00 - 1.80 % 0.80   Baso (Absolute) Latest Ref Range: 0.00 - 0.12 K/uL 0.06   Immature Granulocytes Latest Ref Range: 0.00 - 0.90 % 0.30   Immature Granulocytes (abs) Latest Ref Range: 0.00 - 0.11 K/uL 0.02   Nucleated RBC Latest Units: /100 WBC 0.00   NRBC (Absolute) Latest Units: K/uL 0.00   Results for FLORENCIO SINCLAIR (MRN [id number]) as of 11/25/2018 22:38   Ref. Range 10/2/2018 08:04   Sodium Latest Ref Range: 135 - 145 mmol/L 139   Potassium Latest Ref Range: 3.6 - 5.5 mmol/L 4.2   Chloride Latest Ref Range: 96 - 112 mmol/L 108   Co2 Latest Ref  "Range: 20 - 33 mmol/L 24   Anion Gap Latest Ref Range: 0.0 - 11.9  7.0   Glucose Latest Ref Range: 65 - 99 mg/dL 87   Bun Latest Ref Range: 8 - 22 mg/dL 10   Creatinine Latest Ref Range: 0.50 - 1.40 mg/dL 0.69   GFR If  Latest Ref Range: >60 mL/min/1.73 m 2 >60   GFR If Non  Latest Ref Range: >60 mL/min/1.73 m 2 >60   Calcium Latest Ref Range: 8.5 - 10.5 mg/dL 9.5   AST(SGOT) Latest Ref Range: 12 - 45 U/L 12   ALT(SGPT) Latest Ref Range: 2 - 50 U/L 15   Alkaline Phosphatase Latest Ref Range: 30 - 99 U/L 47   Total Bilirubin Latest Ref Range: 0.1 - 1.5 mg/dL 1.2   Albumin Latest Ref Range: 3.2 - 4.9 g/dL 4.4   Total Protein Latest Ref Range: 6.0 - 8.2 g/dL 7.1   Globulin Latest Ref Range: 1.9 - 3.5 g/dL 2.7   A-G Ratio Latest Units: g/dL 1.6       Imaging:   Today I reviewed the patient's most recent MRI images with her in the examination room. I explained basic terminology of MRI's, verbalized my assessment, and answered her questions.     MRI Brain w/wo contrast from 10/12/18  1.  Numerous ovoid and elliptical areas of demyelination involving the periventricular and juxtacortical white matter, as well as the left cerebral peduncle and brachium pontis bilaterally. These findings are consistent with the patient's known diagnosis   of multiple sclerosis.  2.  There is a \"active\" enhancing lesion in the right brachium pontis.  3.  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    MRI C-Spine w/wo contrast from 10/12/18  1.  Multiple areas of abnormal bright T2 cord signal scattered throughout the cervical and upper thoracic regions. There is no abnormal enhancement. These findings are consistent with the patient's history of demyelinating disorder.  2.  No significant degenerative disc disease.    MRI T-Spine w/wo contrast from 10/12/18  1.  Multiple areas of patchy bright T2 signal scattered throughout the spinal cord without abnormal enhancement " consistent with patient's history of demyelinating disorder.  2.  Minimal degenerative disc disease.    Assessment/Plan:  Multiple sclerosis (HCC)  34 yo F with pmhx of gastroparesis and right eye optic neuritis in 2012 attributable to relapsing multiple sclerosis without progression and with disease activity evident on her recent MRI imaging. She previously tried Copaxone, but stopped it due to a medication side effect. She is currently on no disease modifying therapy. Today we reviewed her recent MRI imaging which shows evidence of disease activity as well as chronic demyelinating plaques throughout the spinal cord. Evidence now shows that treating early in the disease course delays the onset of progression and improves outcomes in patients with MS. Today our conversation focused mainly on disease modifying treatment. She tested negative for the ARAM Virus antibody and so would be eligible for Tysabri. In addition to Tysabri, we also discussed potential risks and side effects of Gilenya, Ocrevus, Rituxan, Tecfidera, and Aubagio. Given her GI issues, Tecfidera and Aubagio would not be good choices for her. We decided to proceed with Gilenya and filled out paperwork to submit to her insurance company for approval. We discussed side effects of Gilenya including bradycardia, which would require monitoring in the office for 6 hours after taking the first dose, macular edema for which she would have to undergo a baseline opthalmologic evaluation, re-evaluation after 3 months of starting the medication, and annual examinations after that, as well as hepatotoxicity and leukopenia, which would require blood draws every 6 months.     Plan:  1. We will submit a request form for Leeenya to her insurance.  2. Referral to ophthalmology for baseline eye exam  3. She will need an EKG which [employer] can help her arrange prior to her FDO  4. Follow up in 6-8 weeks for medication check.      Anxiety  Referral placed for psychology  evaluation who can hopefully work with her on cognitive behavioral therapy and recommend anti-anxiety medication if warranted.      Greater than 50% of this 40 minute face to face follow up encounter was devoted to disease state counseling on Multiple Sclerosis and coordination of care. Additional time was spent on MRI review, review of recent labs, and discussion of disease modifying treatments, their risks and side effects (see above assessment and plan for further details of discussion).       Isabel Estes D.O., M.P.H  MS specialist.   Board Certified Neurologist.  Neurology Clerkship Director, [facility].    Neurology,  [facility].   Tel: [phone]  Fax: [phone]

## 2018-11-29 ENCOUNTER — TELEPHONE (OUTPATIENT)
Dept: NEUROLOGY | Facility: MEDICAL CENTER | Age: 33
End: 2018-11-29

## 2018-11-29 NOTE — ASSESSMENT & PLAN NOTE
32 yo F with pmhx of gastroparesis and right eye optic neuritis in 2012 attributable to relapsing multiple sclerosis without progression and with disease activity evident on her recent MRI imaging. She previously tried Copaxone, but stopped it due to a medication side effect. She is currently on no disease modifying therapy. Today we reviewed her recent MRI imaging which shows evidence of disease activity as well as chronic demyelinating plaques throughout the spinal cord. Evidence now shows that treating early in the disease course delays the onset of progression and improves outcomes in patients with MS. Today our conversation focused mainly on disease modifying treatment. She tested negative for the ARAM Virus antibody and so would be eligible for Tysabri. In addition to Tysabri, we also discussed potential risks and side effects of Gilenya, Ocrevus, Rituxan, Tecfidera, and Aubagio. Given her GI issues, Tecfidera and Aubagio would not be good choices for her. We decided to proceed with Patt and filled out paperwork to submit to her insurance company for approval. We discussed side effects of Gilenya including bradycardia, which would require monitoring in the office for 6 hours after taking the first dose, macular edema for which she would have to undergo a baseline opthalmologic evaluation, re-evaluation after 3 months of starting the medication, and annual examinations after that, as well as hepatotoxicity and leukopenia, which would require blood draws every 6 months.     Plan:  1. We will submit a request form for Patt to her insurance.  2. Referral to ophthalmology for baseline eye exam  3. She will need an EKG which [employer] can help her arrange prior to her FDO  4. Follow up in 6-8 weeks for medication check.

## 2018-11-29 NOTE — TELEPHONE ENCOUNTER
I spoke with barbraa from [facility]  program, she stated the start form that was faxed to her section #4 was incomplete and will need to be filled and refaxed. I filled out section #4 and refaxed to [employer] and received conformation.

## 2018-11-29 NOTE — ASSESSMENT & PLAN NOTE
Referral placed for psychology evaluation who can hopefully work with her on cognitive behavioral therapy and recommend anti-anxiety medication if warranted.

## 2018-12-10 ENCOUNTER — TELEPHONE (OUTPATIENT)
Dept: NEUROLOGY | Facility: MEDICAL CENTER | Age: 33
End: 2018-12-10

## 2018-12-10 NOTE — TELEPHONE ENCOUNTER
I spoke with Ricci and informed them that pt has not done her fdo pending her ekg and labs that Diane from [facility] Go program will reach out to pt today and get everything set up.

## 2018-12-21 DIAGNOSIS — G35 MULTIPLE SCLEROSIS (HCC): ICD-10-CM

## 2018-12-24 ENCOUNTER — TELEPHONE (OUTPATIENT)
Dept: NEUROLOGY | Facility: MEDICAL CENTER | Age: 33
End: 2018-12-24

## 2018-12-24 NOTE — TELEPHONE ENCOUNTER
Diane from [facility] go [facility] stating she had been trying to get a old of pt, I called pt no answer, no vm set up.

## 2018-12-26 DIAGNOSIS — G35 MULTIPLE SCLEROSIS (HCC): ICD-10-CM

## 2018-12-26 RX ORDER — FINGOLIMOD HYDROCHLORIDE 0.5 MG/1
0.5 CAPSULE ORAL DAILY
Qty: 30 CAP | Refills: 11 | Status: SHIPPED | OUTPATIENT
Start: 2018-12-26 | End: 2020-08-12

## 2018-12-31 ENCOUNTER — TELEPHONE (OUTPATIENT)
Dept: NEUROLOGY | Facility: MEDICAL CENTER | Age: 33
End: 2018-12-31

## 2018-12-31 NOTE — TELEPHONE ENCOUNTER
[facility] called asking if pt has completed her baseline testing, I spoke with pt prior she stated she has not. Pt stated she is scheduled to have her eye exam for 02/06/19 @ 1:30 pm and still needs her ekg. The rep at [facility] stated she will document it and wait for our office to contact them once all baseline testing has been complete.

## 2019-02-12 NOTE — TELEPHONE ENCOUNTER
Called pt she states she has not done EKG.    Called [facility]en[employer] go program spoke with Alpa let her know pt has not done EKG. Not ready for FDO.

## 2019-02-20 ENCOUNTER — TELEPHONE (OUTPATIENT)
Dept: NEUROLOGY | Facility: MEDICAL CENTER | Age: 34
End: 2019-02-20

## 2019-02-20 NOTE — TELEPHONE ENCOUNTER
Called Diane let her know EKG not done yet, she stated will call pt to see if they can set up EKG to be done at pt home.

## 2019-02-20 NOTE — TELEPHONE ENCOUNTER
Diane from [facility] go program asking if pt got her EKG done to set up FDO.  Called pt she states she has not done EKG.

## 2019-04-02 ENCOUNTER — TELEPHONE (OUTPATIENT)
Dept: NEUROLOGY | Facility: MEDICAL CENTER | Age: 34
End: 2019-04-02

## 2019-04-03 DIAGNOSIS — K59.09 OTHER CONSTIPATION: ICD-10-CM

## 2019-04-03 DIAGNOSIS — G35 MULTIPLE SCLEROSIS (HCC): ICD-10-CM

## 2019-04-04 NOTE — TELEPHONE ENCOUNTER
CARRI Jay, Med Ass't   Caller: Unspecified (2 days ago,  9:33 AM)             Referral placed

## 2019-08-08 ENCOUNTER — OFFICE VISIT (OUTPATIENT)
Dept: URGENT CARE | Facility: PHYSICIAN GROUP | Age: 34
End: 2019-08-08
Payer: COMMERCIAL

## 2019-08-08 VITALS
SYSTOLIC BLOOD PRESSURE: 98 MMHG | HEART RATE: 85 BPM | HEIGHT: 64 IN | TEMPERATURE: 97.7 F | DIASTOLIC BLOOD PRESSURE: 66 MMHG | BODY MASS INDEX: 19.29 KG/M2 | OXYGEN SATURATION: 100 % | WEIGHT: 113 LBS

## 2019-08-08 DIAGNOSIS — R25.2 SPASM: ICD-10-CM

## 2019-08-08 DIAGNOSIS — M54.6 ACUTE RIGHT-SIDED THORACIC BACK PAIN: ICD-10-CM

## 2019-08-08 PROCEDURE — 99203 OFFICE O/P NEW LOW 30 MIN: CPT | Performed by: FAMILY MEDICINE

## 2019-08-08 RX ORDER — KETOROLAC TROMETHAMINE 30 MG/ML
30 INJECTION, SOLUTION INTRAMUSCULAR; INTRAVENOUS ONCE
Status: COMPLETED | OUTPATIENT
Start: 2019-08-08 | End: 2019-08-08

## 2019-08-08 RX ORDER — CYCLOBENZAPRINE HCL 5 MG
5-10 TABLET ORAL 3 TIMES DAILY PRN
Qty: 30 TAB | Refills: 0 | Status: SHIPPED | OUTPATIENT
Start: 2019-08-08 | End: 2020-08-12

## 2019-08-08 RX ORDER — KETOROLAC TROMETHAMINE 30 MG/ML
30 INJECTION, SOLUTION INTRAMUSCULAR; INTRAVENOUS ONCE
Status: DISCONTINUED | OUTPATIENT
Start: 2019-08-08 | End: 2019-08-08

## 2019-08-08 RX ADMIN — KETOROLAC TROMETHAMINE 30 MG: 30 INJECTION, SOLUTION INTRAMUSCULAR; INTRAVENOUS at 13:48

## 2019-08-08 ASSESSMENT — ENCOUNTER SYMPTOMS
SENSORY CHANGE: 0
FOCAL WEAKNESS: 0
WEIGHT LOSS: 0
NECK PAIN: 0

## 2019-08-08 ASSESSMENT — PATIENT HEALTH QUESTIONNAIRE - PHQ9: CLINICAL INTERPRETATION OF PHQ2 SCORE: 0

## 2019-08-08 NOTE — PROGRESS NOTES
"Subjective:      Rhea Baum is a 33 y.o. female who presents with Shoulder Pain (Pt reports pain on her right shoulder radiating to her right rib cage. onset 2 weeks.)            2 weeks right upper back pain. Worse with swinging arm and unable to picking up daughter. 6-7/10. No change with head position. No radicular pain to RUE. No fever.  No past medical history of cancer.  No unwanted weight loss.  No cough or shortness of breath.  No flank pain or hematuria.  No myelopathy.  Minimal relief with OTC medications.  No other aggravating or alleviating factors.      Review of Systems   Constitutional: Negative for malaise/fatigue and weight loss.   Musculoskeletal: Negative for neck pain.   Skin: Negative for itching and rash.   Neurological: Negative for sensory change and focal weakness.     .  Medications, Allergies, and current problem list reviewed today in Epic       Objective:     BP (!) 98/66 (BP Location: Left arm, Patient Position: Sitting, BP Cuff Size: Adult)   Pulse 85   Temp 36.5 °C (97.7 °F) (Temporal)   Ht 1.626 m (5' 4\")   Wt 51.3 kg (113 lb)   SpO2 100%   BMI 19.40 kg/m²      Physical Exam   Constitutional: She is oriented to person, place, and time. She appears well-developed and well-nourished. No distress.   HENT:   Head: Normocephalic.   Musculoskeletal:        Back:    Neurological: She is alert and oriented to person, place, and time.   No focal deficits.  Negative Spurling   Skin: Skin is warm and dry.               Assessment/Plan:     1. Acute right-sided thoracic back pain  REFERRAL TO PHYSIATRY (PMR)    ketorolac (TORADOL) injection 30 mg    DISCONTINUED: ketorolac (TORADOL) injection 30 mg   2. Spasm  cyclobenzaprine (FLEXERIL) 5 MG tablet     Differential diagnosis, natural history, supportive care, and indications for immediate follow-up discussed at length.     Suspect musculoskeletal etiology although neurogenic pain is on the differential diagnosis.  She may respond to " trigger point injection if no resolution with conservative treatment.  I will initiate physical therapy.

## 2019-08-29 ENCOUNTER — APPOINTMENT (OUTPATIENT)
Dept: PHYSICAL MEDICINE AND REHAB | Facility: MEDICAL CENTER | Age: 34
End: 2019-08-29
Payer: COMMERCIAL

## 2020-08-11 ENCOUNTER — OFFICE VISIT (OUTPATIENT)
Dept: URGENT CARE | Facility: PHYSICIAN GROUP | Age: 35
End: 2020-08-11
Payer: COMMERCIAL

## 2020-08-11 VITALS
RESPIRATION RATE: 14 BRPM | WEIGHT: 107 LBS | DIASTOLIC BLOOD PRESSURE: 54 MMHG | TEMPERATURE: 99.2 F | OXYGEN SATURATION: 100 % | HEIGHT: 64 IN | SYSTOLIC BLOOD PRESSURE: 82 MMHG | HEART RATE: 76 BPM | BODY MASS INDEX: 18.27 KG/M2

## 2020-08-11 DIAGNOSIS — M54.50 ACUTE RIGHT-SIDED LOW BACK PAIN WITHOUT SCIATICA: ICD-10-CM

## 2020-08-11 DIAGNOSIS — R31.9 HEMATURIA, UNSPECIFIED TYPE: ICD-10-CM
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
## 2020-08-11 LAB
APPEARANCE UR: NORMAL
BILIRUB UR STRIP-MCNC: NORMAL MG/DL
COLOR UR AUTO: NORMAL
GLUCOSE UR STRIP.AUTO-MCNC: NEGATIVE MG/DL
INT CON NEG: NEGATIVE
INT CON POS: POSITIVE
KETONES UR STRIP.AUTO-MCNC: NORMAL MG/DL
LEUKOCYTE ESTERASE UR QL STRIP.AUTO: NEGATIVE
NITRITE UR QL STRIP.AUTO: NEGATIVE
PH UR STRIP.AUTO: 6 [PH] (ref 5–8)
POC URINE PREGNANCY TEST: NEGATIVE
PROT UR QL STRIP: NORMAL MG/DL
RBC UR QL AUTO: NORMAL
SP GR UR STRIP.AUTO: >=1.03
UROBILINOGEN UR STRIP-MCNC: NORMAL MG/DL

## 2020-08-11 PROCEDURE — 99214 OFFICE O/P EST MOD 30 MIN: CPT | Performed by: PHYSICIAN ASSISTANT

## 2020-08-11 PROCEDURE — 81002 URINALYSIS NONAUTO W/O SCOPE: CPT | Performed by: PHYSICIAN ASSISTANT

## 2020-08-11 PROCEDURE — 81025 URINE PREGNANCY TEST: CPT | Performed by: PHYSICIAN ASSISTANT

## 2020-08-11 RX ORDER — DESOGESTREL AND ETHINYL ESTRADIOL 0.15-0.03
KIT ORAL
COMMUNITY
Start: 2020-08-02 | End: 2022-09-27

## 2020-08-11 ASSESSMENT — FIBROSIS 4 INDEX: FIB4 SCORE: 0.37

## 2020-08-12 ENCOUNTER — TELEPHONE (OUTPATIENT)
Dept: URGENT CARE | Facility: PHYSICIAN GROUP | Age: 35
End: 2020-08-12

## 2020-08-12 ENCOUNTER — HOSPITAL ENCOUNTER (OUTPATIENT)
Dept: RADIOLOGY | Facility: MEDICAL CENTER | Age: 35
End: 2020-08-12
Attending: PHYSICIAN ASSISTANT
Payer: COMMERCIAL

## 2020-08-12 DIAGNOSIS — R31.9 HEMATURIA, UNSPECIFIED TYPE: ICD-10-CM

## 2020-08-12 DIAGNOSIS — S32.009A CLOSED FRACTURE OF SPINOUS PROCESS OF LUMBAR VERTEBRA, INITIAL ENCOUNTER (HCC): ICD-10-CM
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
## 2020-08-12 DIAGNOSIS — M54.50 ACUTE RIGHT-SIDED LOW BACK PAIN WITHOUT SCIATICA: ICD-10-CM

## 2020-08-12 PROCEDURE — 74176 CT ABD & PELVIS W/O CONTRAST: CPT | Mod: MG

## 2020-08-12 RX ORDER — HYDROCODONE BITARTRATE AND ACETAMINOPHEN 5; 325 MG/1; MG/1
1 TABLET ORAL EVERY 8 HOURS PRN
Qty: 21 TAB | Refills: 0 | Status: SHIPPED | OUTPATIENT
Start: 2020-08-12 | End: 2020-08-19

## 2020-08-12 ASSESSMENT — ENCOUNTER SYMPTOMS
BACK PAIN: 1
NUMBNESS: 0
DIARRHEA: 0
BOWEL INCONTINENCE: 0
ABDOMINAL PAIN: 0
VOMITING: 0
FALLS: 1
TINGLING: 0

## 2020-08-12 NOTE — TELEPHONE ENCOUNTER
Called and spoke with pt regarding new CT order. Pt will call, schedule and then call us with new appt time. Pt understood.

## 2020-08-12 NOTE — TELEPHONE ENCOUNTER
Pt's insurance company called and states a prior auth is needed for any CT's or US. She gave me a provider line # [phone].

## 2020-08-12 NOTE — PROGRESS NOTES
"Subjective:      Rhea Baum is a 34 y.o. female who presents with Back Pain (LRQ)            Pt is a 33 y/o female who presents to  with right lower back pain and right flank pain after falling down several steps- a few hours ago.   Pt. Reports that she slipped as her socks were slippery- causing her to fall on the right side of her back- then falling down a few steps. She reports right sided flank and low back pain. She denies any discrete hip pain.   She denies any abd. Pain. She does report concern as she feels like she needs to urinate however has not been able to. She does report lower abd. Pressure to urinate. Denies any dysuria.   She did take some Tylenol- however this did not help with pain. She denies any noted hematuria that she is aware- LNMP- last week- she has been off her cycle for 4-5 days.   She denies any vomiting, or diarrhea. She was carrying a vacuum and reports that the vacuum hit her head- otherwise denies any head injury. She denies any LOC or dizziness.   She readily admits that she has not been drinking enough water.       Back Pain  This is a new problem. The current episode started today. The problem occurs constantly. The pain is moderate. The symptoms are aggravated by twisting (Transition. ). Pertinent negatives include no abdominal pain, bladder incontinence, bowel incontinence, dysuria, numbness or tingling. Risk factors include recent trauma. Treatments tried: As above.        Review of Systems   Gastrointestinal: Negative for abdominal pain, bowel incontinence, diarrhea and vomiting.   Genitourinary: Negative for bladder incontinence and dysuria.   Musculoskeletal: Positive for back pain and falls.   Neurological: Negative for tingling and numbness.   All other systems reviewed and are negative.         Objective:     BP (!) 82/54   Pulse 76   Temp 37.3 °C (99.2 °F)   Resp 14   Ht 1.626 m (5' 4\")   Wt 48.5 kg (107 lb)   LMP 07/30/2020 (Approximate)   SpO2 100%   BMI " 18.37 kg/m²    PMH:  has a past medical history of Anxiety, GERD (gastroesophageal reflux disease), Multiple sclerosis during pregnancy ([facility]), and PSVT (paroxysmal supraventricular tachycardia) ([facility]). She also has no past medical history of Asthma or Kidney disease.  MEDS: Reviewed .   ALLERGIES: No Known Allergies  SURGHX:   Past Surgical History:   Procedure Laterality Date   • OTHER CARDIAC SURGERY      Age 8      SOCHX:  reports that she has been smoking cigarettes. She has a 0.75 pack-year smoking history. She has never used smokeless tobacco. She reports current alcohol use. She reports that she does not use drugs.  FH: Family history was reviewed, no pertinent findings to report    Physical Exam  Vitals signs reviewed.   Constitutional:       General: She is not in acute distress.     Appearance: She is well-developed.   HENT:      Head: Normocephalic and atraumatic.      Right Ear: External ear normal.      Left Ear: External ear normal.      Mouth/Throat:      Pharynx: No oropharyngeal exudate.   Eyes:      Conjunctiva/sclera: Conjunctivae normal.      Pupils: Pupils are equal, round, and reactive to light.   Neck:      Musculoskeletal: Normal range of motion and neck supple.      Trachea: No tracheal deviation.   Cardiovascular:      Rate and Rhythm: Normal rate.   Pulmonary:      Effort: Pulmonary effort is normal. No respiratory distress.   Abdominal:      Tenderness: There is abdominal tenderness.   Musculoskeletal:         General: Tenderness present.      Lumbar back: She exhibits decreased range of motion, tenderness, bony tenderness, edema and pain.        Back:       Comments: Diffuse tenderness to the right lumbar aspect of back along with flank with associated ecchymosis and edema.  Without specific midline spinal tenderness.  Limited range of motion and limited exam secondary to pain.  Without posterior rib tenderness.   Skin:     General: Skin is warm.      Findings: No rash.   Neurological:       Mental Status: She is alert and oriented to person, place, and time.      Coordination: Coordination normal.   Psychiatric:         Behavior: Behavior normal.         Thought Content: Thought content normal.         Judgment: Judgment normal.              UA-   Protein- 100, trace blood, 40 ketones, small bilirubin,   HCG negative.     Assessment/Plan:        1. Acute right-sided low back pain without sciatica  - POCT Urinalysis  - POCT Pregnancy  - CT-ABDOMEN-PELVIS WITH; Future    2. Fall, initial encounter  - POCT Urinalysis  - POCT Pregnancy  - CT-ABDOMEN-PELVIS WITH; Future    3. Hematuria, unspecified type  - CT-ABDOMEN-PELVIS WITH; Future    Case number- [id number]- CT- abd/pelvis without.   Approved- [id number]     Unfortunately [facility] is requiring evaluation through [facility]-after prior authorization they are noting that only a CT abdomen pelvis without is approved of which at this time we are not looking for renal stone as patient is with recent trauma.  Appointment is the 18th  Called and spoke with the patient regarding such and potential delaying care as patient is very uncomfortable imaging was not will need to rule out intra-abdominal abnormality but also bony abnormality.  Patient reports that over the counters along with muscle relaxant is not making a difference and feels as though her pain is worsening.  I do feel that patient needs prompt work-up at this time-patient instructed to have further evaluation in the emergency room at this time due to potential delaying care as imaging appointment is on the 18th.  Pt is agreeable at this time.

## 2020-08-13 NOTE — PROGRESS NOTES
Called and spoke with patient regarding CT results.  Also reach out to sports medicine who noted that case does not appear to be surgical may need pain control along with limitations on activity were strongly encouraged.  We will make a referral to primary care at this time.  We will also write for Norco-discussed pain agreement with patient and side effects of medication, no refills, need to stay on top of constipation as patient does have prior history to be prone to such.  Patient also sees neurology for her MS- strongly encouraged follow up as well.   Pt. Is very agreeable to the plan and understands.

## 2020-09-16 ENCOUNTER — TELEPHONE (OUTPATIENT)
Dept: SCHEDULING | Facility: IMAGING CENTER | Age: 35
End: 2020-09-16

## 2020-09-23 ENCOUNTER — OFFICE VISIT (OUTPATIENT)
Dept: MEDICAL GROUP | Facility: MEDICAL CENTER | Age: 35
End: 2020-09-23
Payer: COMMERCIAL

## 2020-09-23 VITALS
TEMPERATURE: 98.2 F | HEIGHT: 64 IN | HEART RATE: 96 BPM | WEIGHT: 107 LBS | SYSTOLIC BLOOD PRESSURE: 94 MMHG | OXYGEN SATURATION: 98 % | BODY MASS INDEX: 18.27 KG/M2 | DIASTOLIC BLOOD PRESSURE: 56 MMHG | RESPIRATION RATE: 18 BRPM

## 2020-09-23 DIAGNOSIS — G35 MULTIPLE SCLEROSIS (HCC): ICD-10-CM

## 2020-09-23 DIAGNOSIS — S32.000D CLOSED COMPRESSION FRACTURE OF LUMBOSACRAL SPINE WITH ROUTINE HEALING, SUBSEQUENT ENCOUNTER: ICD-10-CM

## 2020-09-23 DIAGNOSIS — K59.2 CONSTIPATION DUE TO NEUROGENIC BOWEL: ICD-10-CM

## 2020-09-23 DIAGNOSIS — K59.00 CONSTIPATION DUE TO NEUROGENIC BOWEL: ICD-10-CM

## 2020-09-23 DIAGNOSIS — F17.200 SMOKING: ICD-10-CM

## 2020-09-23 PROBLEM — S32.000A CLOSED COMPRESSION FRACTURE OF LUMBOSACRAL SPINE (HCC): Status: ACTIVE | Noted: 2020-09-23

## 2020-09-23 PROBLEM — F41.9 ANXIETY: Status: RESOLVED | Noted: 2018-09-26 | Resolved: 2020-09-23

## 2020-09-23 PROCEDURE — 99214 OFFICE O/P EST MOD 30 MIN: CPT | Performed by: NURSE PRACTITIONER

## 2020-09-23 ASSESSMENT — FIBROSIS 4 INDEX: FIB4 SCORE: 0.37

## 2020-09-23 ASSESSMENT — ENCOUNTER SYMPTOMS: BACK PAIN: 1

## 2020-09-23 NOTE — PROGRESS NOTES
Subjective:      Rhea Baum is a 34 y.o. female who presents with No chief complaint on file.        CC: Patient presents to the clinic to become established and for follow up on her history of MS and lumber fracture.     HPI         1. 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 She was last seen by neurology 11/26/2018. At that time her most recent MRI showed disease activity and chronic demyelinating plaques through out the spinal cord. Patient tried two medications, Copaxin and Gilenya, which she was unable to tolerate due to feelings of flu like symptoms and extreme fatigue.     2. Closed compression fracture of lumbosacral spine with routine healing, subsequent encounter  Pt was seen in Urgent Care 8/11/2020 after a fall at home down several steps which resulted with back pain and right flank pain radiating. CT of the abdomen showed a nondisplaced fracture through the base of the right spinous process of L3 with no hydronephrosis, nephrolithiasis, free fluid or hematoma. Patient advised to limit lifting and activities and follow up with primary care.     Patient reports that she continues to have pain but it is not radiating into the hip any longer. She says she has also not followed the limits on lifting and activities very accurately.      3. Constipation due to neurogenic bowel  Patient reports no problems as long as she uses medications and has been using Miralax and over the counter stool softeners with regular bowel movements.     4. Smoking  Patient continues to smoke one pack a day.   Past Medical History:   Diagnosis Date   • Anxiety    • GERD (gastroesophageal reflux disease)    • Multiple sclerosis during pregnancy ([facility])    • PSVT (paroxysmal supraventricular tachycardia) ([facility])      Social History     Socioeconomic History   • Marital status:      Spouse name: Not on file   •  Number of children: Not on file   • Years of education: Not on file   • Highest education level: Associate degree: occupational, technical, or vocational program   Occupational History   • Not on file   Social Needs   • Financial resource strain: Somewhat hard   • Food insecurity     Worry: Never true     Inability: Never true   • Transportation needs     Medical: No     Non-medical: No   Tobacco Use   • Smoking status: Current Every Day Smoker     Packs/day: 1.00     Years: 0.75     Pack years: 0.75     Types: Cigarettes   • Smokeless tobacco: Never Used   Substance and Sexual Activity   • Alcohol use: Yes     Frequency: Monthly or less     Drinks per session: 1 or 2     Binge frequency: Never     Comment: occ   • Drug use: No   • Sexual activity: Yes     Partners: Male     Birth control/protection: Pill   Lifestyle   • Physical activity     Days per week: 4 days     Minutes per session: 40 min   • Stress: To some extent   Relationships   • Social connections     Talks on phone: More than three times a week     Gets together: Three times a week     Attends [employer] service: Never     Active member of club or organization: Yes     Attends meetings of clubs or organizations: Patient refused     Relationship status:    • Intimate partner violence     Fear of current or ex partner: Not on file     Emotionally abused: Not on file     Physically abused: Not on file     Forced sexual activity: Not on file   Other Topics Concern   • Not on file   Social History Narrative   • Not on file     Current Outpatient Medications   Medication Sig Dispense Refill   • ISIBLOOM 0.15-30 MG-MCG per tablet        No current facility-administered medications for this visit.      Family History   Problem Relation Age of Onset   • Heart Disease Maternal Grandmother    • Diabetes Paternal Grandmother         Type 2   • No Known Problems Mother    • No Known Problems Sister    • No Known Problems Daughter          Review of Systems  "  Constitutional: Positive for malaise/fatigue.   Musculoskeletal: Positive for back pain.   All other systems reviewed and are negative.         Objective:     BP (!) 94/56   Pulse 96   Temp 36.8 °C (98.2 °F)   Resp 18   Ht 1.626 m (5' 4\")   Wt 48.5 kg (107 lb)   SpO2 98%   BMI 18.37 kg/m²      Physical Exam  Vitals signs and nursing note reviewed.   Constitutional:       General: She is not in acute distress.     Appearance: She is well-developed. She is not diaphoretic.   HENT:      Head: Normocephalic and atraumatic.      Right Ear: External ear normal.      Left Ear: External ear normal.      Nose: Nose normal.   Eyes:      General:         Right eye: No discharge.         Left eye: No discharge.   Neck:      Musculoskeletal: Normal range of motion and neck supple.      Thyroid: No thyromegaly.   Cardiovascular:      Rate and Rhythm: Normal rate and regular rhythm.      Heart sounds: Normal heart sounds. No murmur. No friction rub. No gallop.    Pulmonary:      Effort: Pulmonary effort is normal.      Breath sounds: Normal breath sounds. No wheezing or rales.   Musculoskeletal:         General: No tenderness.      Lumbar back: She exhibits decreased range of motion and pain.      Comments: 5/5 strength of lower extremities bilaterally.   Skin:     General: Skin is warm and dry.      Findings: No rash.   Neurological:      Mental Status: She is alert and oriented to person, place, and time.      Deep Tendon Reflexes: Reflexes normal.   Psychiatric:         Behavior: Behavior normal.         Thought Content: Thought content normal.         Judgment: Judgment normal.                 Assessment/Plan:        1. Multiple sclerosis (HCC)  Patient would like to reestablish as her previous neurologist  Has moved and she would like to attempt another medication if able.   - REFERRAL TO NEUROLOGY    2. Closed compression fracture of lumbosacral spine with routine healing, subsequent encounter  Patient is interested " in speaking with Neurosurgery to determine if she may require further interventions. Advised to follow weight and activities limits.  Over-the-counter medicines for pain  - REFERRAL TO NEUROSURGERY    3. Constipation due to neurogenic bowel  Patient will continue to utilize her over the counter fiber and stool softeners and with new neurologist in relation to her MS.     4. Smoking  Patient aware of need to quit smoking.

## 2021-01-18 ENCOUNTER — OFFICE VISIT (OUTPATIENT)
Dept: MEDICAL GROUP | Facility: MEDICAL CENTER | Age: 36
End: 2021-01-18
Payer: COMMERCIAL

## 2021-01-18 VITALS
OXYGEN SATURATION: 97 % | WEIGHT: 110 LBS | HEIGHT: 64 IN | SYSTOLIC BLOOD PRESSURE: 102 MMHG | BODY MASS INDEX: 18.78 KG/M2 | RESPIRATION RATE: 16 BRPM | DIASTOLIC BLOOD PRESSURE: 70 MMHG | HEART RATE: 78 BPM

## 2021-01-18 DIAGNOSIS — G35 MULTIPLE SCLEROSIS (HCC): ICD-10-CM

## 2021-01-18 DIAGNOSIS — Z00.00 ROUTINE GENERAL MEDICAL EXAMINATION AT A HEALTH CARE FACILITY: ICD-10-CM

## 2021-01-18 DIAGNOSIS — G43.109 MIGRAINE WITH AURA AND WITHOUT STATUS MIGRAINOSUS, NOT INTRACTABLE: ICD-10-CM

## 2021-01-18 DIAGNOSIS — Z72.0 TOBACCO ABUSE: ICD-10-CM

## 2021-01-18 DIAGNOSIS — Z23 NEED FOR INFLUENZA VACCINATION: ICD-10-CM

## 2021-01-18 DIAGNOSIS — S32.000D CLOSED COMPRESSION FRACTURE OF LUMBOSACRAL SPINE WITH ROUTINE HEALING, SUBSEQUENT ENCOUNTER: ICD-10-CM

## 2021-01-18 DIAGNOSIS — Z23 NEED FOR PNEUMOCOCCAL VACCINATION: ICD-10-CM

## 2021-01-18 PROBLEM — F17.200 SMOKING: Status: RESOLVED | Noted: 2018-09-26 | Resolved: 2021-01-18

## 2021-01-18 PROCEDURE — 90686 IIV4 VACC NO PRSV 0.5 ML IM: CPT | Performed by: NURSE PRACTITIONER

## 2021-01-18 PROCEDURE — 99214 OFFICE O/P EST MOD 30 MIN: CPT | Mod: 25 | Performed by: NURSE PRACTITIONER

## 2021-01-18 PROCEDURE — 90471 IMMUNIZATION ADMIN: CPT | Performed by: NURSE PRACTITIONER

## 2021-01-18 PROCEDURE — 90472 IMMUNIZATION ADMIN EACH ADD: CPT | Performed by: NURSE PRACTITIONER

## 2021-01-18 PROCEDURE — 90732 PPSV23 VACC 2 YRS+ SUBQ/IM: CPT | Performed by: NURSE PRACTITIONER

## 2021-01-18 RX ORDER — SUMATRIPTAN 50 MG/1
50 TABLET, FILM COATED ORAL
Qty: 10 TAB | Refills: 3 | Status: SHIPPED | OUTPATIENT
Start: 2021-01-18 | End: 2021-02-23 | Stop reason: SINTOL

## 2021-01-18 ASSESSMENT — ENCOUNTER SYMPTOMS
DIZZINESS: 1
FOCAL WEAKNESS: 1
HEADACHES: 1

## 2021-01-18 ASSESSMENT — PATIENT HEALTH QUESTIONNAIRE - PHQ9: CLINICAL INTERPRETATION OF PHQ2 SCORE: 0

## 2021-01-18 NOTE — PROGRESS NOTES
"Subjective:      Rhea Baum is a 35 y.o. female who presents with Migraine        CC: Patient is here today mainly for complaints of headaches but also for follow-up on MS and tobacco abuse.    HPI         1. Migraine with aura and without status migrainosus, not intractable  Patient reports she has no history of migraines but has noticed since about March of last year.  She has been having regular headaches.  They typically occur about 3-4 times per week and seemed to be sometimes behind her eyes or in the front of her head.  She uses Tylenol which \"takes the edge off\".  She states there is no associated vomiting or thunderclap-like feeling.  She does have dizziness but she states she has had it for years.  There is no syncope.  She does report an aura.  She has not been to neurology in 2 years.  She does not know what triggers the headaches and she states she does not drink much alcohol but does smoke cigarettes.    2. Multiple sclerosis (HCC)  Patient was last seen by Dr. Estes in November 2018 for her MS. she continues to have issues with balance, GI issues and fatigue but was unsuccessful with Copaxone and then Gilenya because of side effects.  She initially did not want to go back to neurology but I convinced her to take a referral and she has an appointment in 2 days.    3. Closed compression fracture of lumbosacral spine with routine healing, subsequent encounter  Patient was seen by [facility] [place] for this a few months ago and she states her back is much better    4. Need for influenza vaccination  Patient due for flu vaccine    5. Need for pneumococcal vaccination  Patient due for pneumococcal vaccine    6. Routine general medical examination at a health care facility  Patient's last regular blood work was 2 years ago    7. Tobacco abuse  Patient smokes a half a pack of cigarettes a day and was successful on Chantix in the past and would like to try this again.  Past Medical History:   Diagnosis Date   • " Anxiety    • GERD (gastroesophageal reflux disease)    • Multiple sclerosis during pregnancy ([facility])    • PSVT (paroxysmal supraventricular tachycardia) ([facility])      Social History     Socioeconomic History   • Marital status:      Spouse name: Not on file   • Number of children: Not on file   • Years of education: Not on file   • Highest education level: Associate degree: occupational, technical, or vocational program   Occupational History   • Not on file   Social Needs   • Financial resource strain: Somewhat hard   • Food insecurity     Worry: Never true     Inability: Never true   • Transportation needs     Medical: No     Non-medical: No   Tobacco Use   • Smoking status: Current Every Day Smoker     Packs/day: 1.00     Years: 0.75     Pack years: 0.75     Types: Cigarettes   • Smokeless tobacco: Never Used   Substance and Sexual Activity   • Alcohol use: Yes     Frequency: Monthly or less     Drinks per session: 1 or 2     Binge frequency: Never     Comment: occ   • Drug use: No   • Sexual activity: Yes     Partners: Male     Birth control/protection: Pill   Lifestyle   • Physical activity     Days per week: 4 days     Minutes per session: 40 min   • Stress: To some extent   Relationships   • Social connections     Talks on phone: More than three times a week     Gets together: Three times a week     Attends Orthodox service: Never     Active member of club or organization: Yes     Attends meetings of clubs or organizations: Patient refused     Relationship status:    • Intimate partner violence     Fear of current or ex partner: Not on file     Emotionally abused: Not on file     Physically abused: Not on file     Forced sexual activity: Not on file   Other Topics Concern   • Not on file   Social History Narrative   • Not on file     Current Outpatient Medications   Medication Sig Dispense Refill   • SUMAtriptan (IMITREX) 50 MG Tab Take 1 Tab by mouth one time as needed for Migraine for up to 1  "dose. 10 Tab 3   • varenicline (CHANTIX STARTING MONTH IGNACIO) 0.5 MG X 11 & 1 MG X 42 tablet For first month 56 Tab 3   • ISIBLOOM 0.15-30 MG-MCG per tablet        No current facility-administered medications for this visit.      Family History   Problem Relation Age of Onset   • Heart Disease Maternal Grandmother    • Diabetes Paternal Grandmother         Type 2   • No Known Problems Mother    • No Known Problems Sister    • No Known Problems Daughter          Review of Systems   Neurological: Positive for dizziness, focal weakness and headaches.   All other systems reviewed and are negative.         Objective:     /70 (BP Location: Left arm, Patient Position: Sitting, BP Cuff Size: Adult)   Pulse 78   Resp 16   Ht 1.626 m (5' 4\")   Wt 49.9 kg (110 lb)   SpO2 97%   BMI 18.88 kg/m²      Physical Exam  Vitals signs and nursing note reviewed.   Constitutional:       General: She is not in acute distress.     Appearance: She is well-developed. She is not diaphoretic.   HENT:      Head: Normocephalic and atraumatic.      Right Ear: External ear normal.      Left Ear: External ear normal.      Nose: Nose normal.   Eyes:      General:         Right eye: No discharge.         Left eye: No discharge.   Neck:      Musculoskeletal: Normal range of motion and neck supple.      Thyroid: No thyromegaly.   Cardiovascular:      Rate and Rhythm: Normal rate and regular rhythm.      Heart sounds: Normal heart sounds. No murmur. No friction rub. No gallop.    Pulmonary:      Effort: Pulmonary effort is normal.      Breath sounds: Normal breath sounds. No wheezing or rales.   Musculoskeletal:         General: No tenderness.   Skin:     General: Skin is warm and dry.      Findings: No rash.   Neurological:      Mental Status: She is alert and oriented to person, place, and time.      Coordination: Romberg sign negative.      Deep Tendon Reflexes: Reflexes normal.   Psychiatric:         Behavior: Behavior normal.         " Thought Content: Thought content normal.         Judgment: Judgment normal.                 Assessment/Plan:        1. Migraine with aura and without status migrainosus, not intractable  Patient having migraines for about 9 months with unknown triggers and only partially helped by over-the-counter medicines.  They occur about 3-4 times per week.  She has an appointment with neurology in 2 days and I advised her to talk with them about this since she does have MS and I wonder if this is part of the process.  She last did an MRI of the brain in 2018 for neurology but is overdue for follow-up.  I advised her to try to avoid triggers if she can find what they are and I will have her try Imitrex and see if this helps short-term.  I will also do some baseline blood work.  - SUMAtriptan (IMITREX) 50 MG Tab; Take 1 Tab by mouth one time as needed for Migraine for up to 1 dose.  Dispense: 10 Tab; Refill: 3  - TSH; Future  - VITAMIN B12; Future  - Sed Rate; Future    2. Multiple sclerosis (HCC)  Patient finally following back with neurology after 2-year hiatus and she admits that her symptoms are slightly worsening and she is probably due for MRI of the brain and she would again like to discuss medications for treating her MS.    3. Closed compression fracture of lumbosacral spine with routine healing, subsequent encounter  Patient states her back is no longer bothering her    4. Need for influenza vaccination  I have placed the below orders and discussed them with an approved delegating provider. The MA is performing the below orders under the direction of Dr. Anibal MD.    - Influenza Vaccine Quad Injection (PF)    5. Need for pneumococcal vaccination  I have placed the below orders and discussed them with an approved delegating provider. The MA is performing the below orders under the direction of Dr. Anibal MD.    - Pneumococal Polysaccharide Vaccine 23-Valent =>3YO SQ/IM    6. Routine general medical examination at  a health care facility  I also advised patient she should get the Covid vaccine when available.  - Comp Metabolic Panel; Future  - CBC WITHOUT DIFFERENTIAL; Future  - VITAMIN D,25 HYDROXY; Future  - TSH; Future  - VITAMIN B12; Future  - Sed Rate; Future    7. Tobacco abuse  Patient was successful on Chantix in the past and I explained I do believe the price is come down some and she also may be able to get a discount through her insurance if she goes to a stop smoking virtual class.  - varenicline (CHANTIX STARTING MONTH IGNACIO) 0.5 MG X 11 & 1 MG X 42 tablet; For first month  Dispense: 56 Tab; Refill: 3

## 2021-01-20 ENCOUNTER — OFFICE VISIT (OUTPATIENT)
Dept: NEUROLOGY | Facility: MEDICAL CENTER | Age: 36
End: 2021-01-20
Attending: REGISTERED NURSE
Payer: COMMERCIAL

## 2021-01-20 VITALS
SYSTOLIC BLOOD PRESSURE: 90 MMHG | HEART RATE: 68 BPM | BODY MASS INDEX: 18.44 KG/M2 | WEIGHT: 108.03 LBS | TEMPERATURE: 97.4 F | DIASTOLIC BLOOD PRESSURE: 60 MMHG | OXYGEN SATURATION: 98 % | HEIGHT: 64 IN

## 2021-01-20 DIAGNOSIS — F17.200 SMOKER: ICD-10-CM

## 2021-01-20 DIAGNOSIS — G35 MULTIPLE SCLEROSIS (HCC): ICD-10-CM

## 2021-01-20 PROCEDURE — 99215 OFFICE O/P EST HI 40 MIN: CPT | Performed by: REGISTERED NURSE

## 2021-01-20 PROCEDURE — 99211 OFF/OP EST MAY X REQ PHY/QHP: CPT | Performed by: REGISTERED NURSE

## 2021-01-20 NOTE — PROGRESS NOTES
Formerly [facility]  MULTIPLE SCLEROSIS & NEUROIMMUNOLOGY  NEW PATIENT VISIT        Also followed by: Dr. Estes in past    DISEASE SUMMARY:  MS History:  Diagnosed:  2012  Lumbar puncture:  NO   First presentation: Optic neuritis  Previous modifying treatment: copaxone, gilenya (exhausted all the time) Off meds x 1 year                Current: none                Former or other neurologist: Meera        CC: MS    HISTORY OF ILLNESS:  Rhea Baum is a 35 y.o. female with a history most notable for  Relapsing Remitting MS.    Today, Rhea was alone, and she provided the following history:    No  meds x 1 year, + smoker `10 per day    Falling, drifting to one side at intervals, very dizzy almost to passing out especially in am.      Constipation, urinary hesitancy      Overall today her concerns were balance issues and we discussed the importance of DMT and smoking cessation.  Her insurance did not cover the cost of chantix, which has worked before for her.        A review of MS-related symptoms was notable for the following:    Fatigue: yes; so bad, just getting off the couch is like running a marathon, back aches with pain with exhausted.  Weakness: yes; lost ability to hold on to things, hands weak, hard time putting foot on break.  Numbness: yes; sometimes numb for a weak to limbs  Incoordination: yes; bad fell down stairs in August and fractured a bone in back  Spasms/Spasticity: yes; back spasms so bad cannot move shoulder  Vision Impairment: yes; wear contacts, vision improved  Walking/Balance Problems: yes; tripping falling  Neuralgia: yes; shooting pains in stomach, pains that cause her to jerk  Bowel Symptoms: yes; constipation  Bladder Symptoms: yes; urinary hesitancy  Heat Sensitivity: no  Depression: no  Cognitive/Memory Problems: yes; word finding difficulty, walks in a room and then forgets why she is there  Sexual Dysfunction: no  Anxiety: yes; Sees Dr. Wynn    MEDICAL AND SURGICAL HISTORY:  Past  Medical History:   Diagnosis Date   • Anxiety    • GERD (gastroesophageal reflux disease)    • Multiple sclerosis during pregnancy (HCC)    • PSVT (paroxysmal supraventricular tachycardia) ([facility])      Past Surgical History:   Procedure Laterality Date   • OTHER CARDIAC SURGERY      Age 8      MEDICATIONS:  Current Outpatient Medications   Medication Sig   • SUMAtriptan (IMITREX) 50 MG Tab Take 1 Tab by mouth one time as needed for Migraine for up to 1 dose.   • varenicline (CHANTIX STARTING MONTH IGNACIO) 0.5 MG X 11 & 1 MG X 42 tablet For first month   • ISIBLOOM 0.15-30 MG-MCG per tablet      SOCIAL HISTORY:  Social History     Tobacco Use   • Smoking status: Current Every Day Smoker     Packs/day: 1.00     Years: 0.75     Pack years: 0.75     Types: Cigarettes   • Smokeless tobacco: Never Used   Substance Use Topics   • Alcohol use: Yes     Frequency: Monthly or less     Drinks per session: 1 or 2     Binge frequency: Never     Comment: occ     Social History     Social History Narrative   • Not on file     FAMILY HISTORY:  Family History   Problem Relation Age of Onset   • Heart Disease Maternal Grandmother    • Diabetes Paternal Grandmother         Type 2   • No Known Problems Mother    • No Known Problems Sister    • No Known Problems Daughter      REVIEW OF SYSTEMS:  A ROS was completed.  Pertinent positives and negatives were included in the HPI, above.  All other systems were reviewed and are negative.    PHYSICAL EXAM:  General/Medical:  - NAD  - hair, skin, nails, and joints were normal  - neck was supple without Lhermitte's phenomenon  - heart rate and rhythm were regular    Neuro:  MENTAL STATUS: awake and alert; no deficits of speech or language; oriented to person, place, and time; affect was appropriate to situation    CRANIAL NERVES:    II:  pupils 3/3 to 2/2 without a relative afferent pupillary defect; discs sharp; no red desaturation noted    III/IV/VI: versions intact without nystagmus    V: facial  "sensation symmetric to light touch    VII: facial expression symmetric    VIII: hearing intact to finger rub    IX/X: palate elevates symmetrically    XI: shoulder shrug symmetric    XII: tongue midline    MOTOR:  - bulk and tone were normal throughout  Upper Extremity Strength  (R/L)    4/5   Elbow flexion 4/5   Elbow extension 5/5   Shoulder abduction 5/5     Lower Extremity Strength  (R/L)   Hip flexion 4/5   Knee extension 4/5   Knee flexion 5/5   Ankle plantarflexion 5/5   Ankle dorsiflexion 5/5     -  can walk on toes and heels  - no pronator drift; no abnormal movements    SENSATION:  - light touch: equal to bilateral sides of body  - vibration (R/L, seconds): 5/10 at the great toes  - pinprick: N/T  - proprioception: N/T  - Romberg: absent but does sway    COORDINATION:  - finger to nose was normal, no ataxia on exam  - finger tapping was rapid and accurate, bilaterally    REFLEXES:  Reflex Right Left   BR 2+ 2+   Biceps 2+ 2+   Triceps 2+ 2+   Patellae 2+ 2+   Achilles 2+ 2+   Toes mute mute     GAIT:  - narrow base and normal  - heel-raised and toe-raised gait: intact  - tandem gait: intact    QUANTITATIVE SCORES:  Timed 25-foot walk (sec): 5.63.  Assistive device: none    REVIEW OF IMAGING STUDIES: I reviewed the following studies:  MRI Brain:  Date: 10/12/18  Impression:  IMPRESSION:        1.  Numerous ovoid and elliptical areas of demyelination involving the periventricular and juxtacortical white matter, as well as the left cerebral peduncle and brachium pontis bilaterally. These findings are consistent with the patient's known diagnosis   of multiple sclerosis.     2.  There is a \"active\" enhancing lesion in the right brachium pontis.     3.  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            MRI Cervical Spine:  Date: 10/12/2018  Impression:  IMPRESSION:     1.  Multiple areas of abnormal bright T2 cord signal scattered throughout the cervical and " upper thoracic regions. There is no abnormal enhancement. These findings are consistent with the patient's history of demyelinating disorder.     2.  No significant degenerative disc disease.             Last Resulted: 10/12/18 11:08 AM          MRI Thoracic Spine:  Impression:  IMPRESSION:     1.  Multiple areas of patchy bright T2 signal scattered throughout the spinal cord without abnormal enhancement consistent with patient's history of demyelinating disorder.     2.  Minimal degenerative disc disease.            REVIEW OF LABORATORY STUDIES:      ASSESSMENT:  Rhea Baum is a 35 y.o. female with MS since 2012 for which her first symptom was optic neuritis.  She has tried copaxone and gilenya for which she stopped secondary to side effects.  She has not neen on DMT x 1 year.  Also complains of migraines several times a month. Was given Imitrex by primary recently.      PLAN:  Continue to try to cut down smoking  Blood work for DMT tomorrow  Patient agreed to f/u virtual visit for migraine H/A discussion    My total time spent caring for the patient on the day of the encounter was 60 minutes.     This does not include time spent on separately billable procedures/tests.        Follow-Up:  - Via virtual for Headache management.  Ana to call tomorrow.    Signed: FADI Mckoy at 1:10 PM on 01/20/21

## 2021-01-21 ENCOUNTER — HOSPITAL ENCOUNTER (OUTPATIENT)
Dept: LAB | Facility: MEDICAL CENTER | Age: 36
End: 2021-01-21
Attending: REGISTERED NURSE
Payer: COMMERCIAL

## 2021-01-21 ENCOUNTER — HOSPITAL ENCOUNTER (OUTPATIENT)
Dept: LAB | Facility: MEDICAL CENTER | Age: 36
End: 2021-01-21
Attending: NURSE PRACTITIONER
Payer: COMMERCIAL

## 2021-01-21 DIAGNOSIS — G43.109 MIGRAINE WITH AURA AND WITHOUT STATUS MIGRAINOSUS, NOT INTRACTABLE: ICD-10-CM

## 2021-01-21 DIAGNOSIS — G35 MULTIPLE SCLEROSIS (HCC): ICD-10-CM

## 2021-01-21 DIAGNOSIS — Z00.00 ROUTINE GENERAL MEDICAL EXAMINATION AT A HEALTH CARE FACILITY: ICD-10-CM

## 2021-01-21 LAB
25(OH)D3 SERPL-MCNC: 46 NG/ML (ref 30–100)
25(OH)D3 SERPL-MCNC: 46 NG/ML (ref 30–100)
ALBUMIN SERPL BCP-MCNC: 4.5 G/DL (ref 3.2–4.9)
ALBUMIN/GLOB SERPL: 1.7 G/DL
ALP SERPL-CCNC: 55 U/L (ref 30–99)
ALT SERPL-CCNC: 25 U/L (ref 2–50)
ANION GAP SERPL CALC-SCNC: 12 MMOL/L (ref 7–16)
AST SERPL-CCNC: 21 U/L (ref 12–45)
BASOPHILS # BLD AUTO: 0.5 % (ref 0–1.8)
BASOPHILS # BLD: 0.04 K/UL (ref 0–0.12)
BILIRUB SERPL-MCNC: 0.5 MG/DL (ref 0.1–1.5)
BUN SERPL-MCNC: 10 MG/DL (ref 8–22)
CALCIUM SERPL-MCNC: 9.6 MG/DL (ref 8.5–10.5)
CHLORIDE SERPL-SCNC: 101 MMOL/L (ref 96–112)
CO2 SERPL-SCNC: 23 MMOL/L (ref 20–33)
CREAT SERPL-MCNC: 0.64 MG/DL (ref 0.5–1.4)
EOSINOPHIL # BLD AUTO: 0.17 K/UL (ref 0–0.51)
EOSINOPHIL NFR BLD: 2.2 % (ref 0–6.9)
ERYTHROCYTE [DISTWIDTH] IN BLOOD BY AUTOMATED COUNT: 43.8 FL (ref 35.9–50)
ERYTHROCYTE [DISTWIDTH] IN BLOOD BY AUTOMATED COUNT: 44.8 FL (ref 35.9–50)
FASTING STATUS PATIENT QL REPORTED: NORMAL
GLOBULIN SER CALC-MCNC: 2.7 G/DL (ref 1.9–3.5)
GLUCOSE SERPL-MCNC: 87 MG/DL (ref 65–99)
HAV IGM SERPL QL IA: NORMAL
HBV CORE IGM SER QL: NORMAL
HBV SURFACE AG SER QL: NORMAL
HCT VFR BLD AUTO: 41.3 % (ref 37–47)
HCT VFR BLD AUTO: 41.6 % (ref 37–47)
HCV AB SER QL: NORMAL
HGB BLD-MCNC: 13.4 G/DL (ref 12–16)
HGB BLD-MCNC: 13.5 G/DL (ref 12–16)
IMM GRANULOCYTES # BLD AUTO: 0.03 K/UL (ref 0–0.11)
IMM GRANULOCYTES NFR BLD AUTO: 0.4 % (ref 0–0.9)
LYMPHOCYTES # BLD AUTO: 1.98 K/UL (ref 1–4.8)
LYMPHOCYTES NFR BLD: 25.8 % (ref 22–41)
MCH RBC QN AUTO: 31.3 PG (ref 27–33)
MCH RBC QN AUTO: 31.5 PG (ref 27–33)
MCHC RBC AUTO-ENTMCNC: 32.4 G/DL (ref 33.6–35)
MCHC RBC AUTO-ENTMCNC: 32.5 G/DL (ref 33.6–35)
MCV RBC AUTO: 96.5 FL (ref 81.4–97.8)
MCV RBC AUTO: 97 FL (ref 81.4–97.8)
MONOCYTES # BLD AUTO: 0.38 K/UL (ref 0–0.85)
MONOCYTES NFR BLD AUTO: 5 % (ref 0–13.4)
NEUTROPHILS # BLD AUTO: 5.06 K/UL (ref 2–7.15)
NEUTROPHILS NFR BLD: 66.1 % (ref 44–72)
NRBC # BLD AUTO: 0 K/UL
NRBC BLD-RTO: 0 /100 WBC
PLATELET # BLD AUTO: 318 K/UL (ref 164–446)
PLATELET # BLD AUTO: 333 K/UL (ref 164–446)
PMV BLD AUTO: 10.8 FL (ref 9–12.9)
PMV BLD AUTO: 11.3 FL (ref 9–12.9)
POTASSIUM SERPL-SCNC: 3.8 MMOL/L (ref 3.6–5.5)
PROT SERPL-MCNC: 7.2 G/DL (ref 6–8.2)
RBC # BLD AUTO: 4.28 M/UL (ref 4.2–5.4)
RBC # BLD AUTO: 4.29 M/UL (ref 4.2–5.4)
SODIUM SERPL-SCNC: 136 MMOL/L (ref 135–145)
TSH SERPL DL<=0.005 MIU/L-ACNC: 0.46 UIU/ML (ref 0.38–5.33)
VIT B12 SERPL-MCNC: 321 PG/ML (ref 211–911)
VIT B12 SERPL-MCNC: 324 PG/ML (ref 211–911)
WBC # BLD AUTO: 7.7 K/UL (ref 4.8–10.8)
WBC # BLD AUTO: 7.9 K/UL (ref 4.8–10.8)

## 2021-01-21 PROCEDURE — 85027 COMPLETE CBC AUTOMATED: CPT

## 2021-01-21 PROCEDURE — 85025 COMPLETE CBC W/AUTO DIFF WBC: CPT

## 2021-01-21 PROCEDURE — 82306 VITAMIN D 25 HYDROXY: CPT

## 2021-01-21 PROCEDURE — 82784 ASSAY IGA/IGD/IGG/IGM EACH: CPT

## 2021-01-21 PROCEDURE — 85652 RBC SED RATE AUTOMATED: CPT

## 2021-01-21 PROCEDURE — 86359 T CELLS TOTAL COUNT: CPT

## 2021-01-21 PROCEDURE — 84443 ASSAY THYROID STIM HORMONE: CPT

## 2021-01-21 PROCEDURE — 82607 VITAMIN B-12: CPT | Mod: 91

## 2021-01-21 PROCEDURE — 86355 B CELLS TOTAL COUNT: CPT

## 2021-01-21 PROCEDURE — 36415 COLL VENOUS BLD VENIPUNCTURE: CPT

## 2021-01-21 PROCEDURE — 82607 VITAMIN B-12: CPT

## 2021-01-21 PROCEDURE — 86360 T CELL ABSOLUTE COUNT/RATIO: CPT

## 2021-01-21 PROCEDURE — 80074 ACUTE HEPATITIS PANEL: CPT

## 2021-01-21 PROCEDURE — 80053 COMPREHEN METABOLIC PANEL: CPT

## 2021-01-21 PROCEDURE — 82306 VITAMIN D 25 HYDROXY: CPT | Mod: 91

## 2021-01-21 PROCEDURE — 86357 NK CELLS TOTAL COUNT: CPT

## 2021-01-21 NOTE — PROGRESS NOTES
Virtual Visit: Established Patient   This visit was conducted via Zoom using secure and encrypted videoconferencing technology. The patient was in a private location in the state of [place].    The patient's identity was confirmed and verbal consent was obtained for this virtual visit.    Subjective:   CC:   Chief Complaint   Patient presents with   • Follow-Up       Rhea Baum is a 35 y.o. female presenting for evaluation and management of:  MS ANGEL   Denies any recent fevers or chills. No nausea or vomiting. No chest pains or shortness of breath.     No Known Allergies    Current medicines (including changes today)  Current Outpatient Medications   Medication Sig Dispense Refill   • nortriptyline (PAMELOR) 25 MG Cap Take 1 Cap by mouth every day. 30 Cap 10   • SUMAtriptan (IMITREX) 50 MG Tab Take 1 Tab by mouth one time as needed for Migraine for up to 1 dose. 10 Tab 3   • ISIBLOOM 0.15-30 MG-MCG per tablet      • SUMAtriptan (IMITREX) 50 MG Tab Take 1 Tab by mouth one time as needed for Migraine for up to 1 dose. (Patient not taking: Reported on 1/22/2021) 10 Tab 3   • varenicline (CHANTIX STARTING MONTH IGNACIO) 0.5 MG X 11 & 1 MG X 42 tablet For first month (Patient not taking: Reported on 1/22/2021) 56 Tab 3     No current facility-administered medications for this visit.        Patient Active Problem List    Diagnosis Date Noted   • Smoker 01/20/2021   • Closed compression fracture of lumbosacral spine ([facility]) 09/23/2020   • Multiple sclerosis ([facility]) 09/26/2018   • Tobacco abuse 09/26/2018   • IUGR (intrauterine growth restriction) affecting care of mother 04/12/2017   • PSVT (paroxysmal supraventricular tachycardia) ([facility]) 12/01/2016   • Constipation due to neurogenic bowel 12/01/2016       Family History   Problem Relation Age of Onset   • Heart Disease Maternal Grandmother    • Diabetes Paternal Grandmother         Type 2   • No Known Problems Mother    • No Known Problems Sister    • No Known Problems  "Daughter        She  has a past medical history of Anxiety, GERD (gastroesophageal reflux disease), Multiple sclerosis during pregnancy (HCC), and PSVT (paroxysmal supraventricular tachycardia) ([facility]). She also has no past medical history of Asthma or Kidney disease.  She  has a past surgical history that includes other cardiac surgery.       Objective:   BP (!) 90/60 Comment: virtual visit  Ht 1.626 m (5' 4\")   Wt 49 kg (108 lb) Comment: virtual visit  BMI 18.54 kg/m²     Physical Exam:  Constitutional: Alert, no distress, well-groomed.  Skin: No rashes in visible areas.  Eye: Round. Conjunctiva clear, lids normal. No icterus.   ENMT: Lips pink without lesions, good dentition, moist mucous membranes. Phonation normal.  Neck: No masses, no thyromegaly. Moves freely without pain.  Respiratory: Unlabored respiratory effort, no cough or audible wheeze  Psych: Alert and oriented x3, normal affect and mood.       Assessment and Plan:   The following treatment plan was discussed:     1. Multiple sclerosis (HCC)  - nortriptyline (PAMELOR) 25 MG Cap; Take 1 Cap by mouth every day.  Dispense: 30 Cap; Refill: 10  - SUMAtriptan (IMITREX) 50 MG Tab; Take 1 Tab by mouth one time as needed for Migraine for up to 1 dose.  Dispense: 10 Tab; Refill: 3      2. Intractable migraine with aura with status migrainosus  - nortriptyline (PAMELOR) 25 MG Cap; Take 1 Cap by mouth every day.  Dispense: 30 Cap; Refill: 10  - SUMAtriptan (IMITREX) 50 MG Tab; Take 1 Tab by mouth one time as needed for Migraine for up to 1 dose.  Dispense: 10 Tab; Refill: 3    Other orders  - NS infusion  - methylPREDNISolone sod succ (SOLU-MEDROL) 125 MG injection 100 mg  - acetaminophen (Tylenol) tablet 650 mg  - diphenhydrAMINE (BENADRYL) tablet/capsule 25 mg  - ocrelizumab (OCREVUS) 300 mg in  mL IVPB  - EPINEPHrine injection 0.5 mg  - diphenhydrAMINE (BENADRYL) injection 50 mg  - methylPREDNISolone sod succ (SOLU-MEDROL) 125 MG injection 125 mg  - " ondansetron (ZOFRAN ODT) dispertab 4 mg  - ondansetron (ZOFRAN) syringe/vial injection 8 mg  - normal saline PF 0.9 % 3 mL  - normal saline PF 0.9 % 10 mL  - normal saline PF 0.9 % 10 mL  - normal saline PF 0.9 %  - heparin lock flush 100 unit/mL injection 500 Units        Follow-up: No follow-ups on file.         This evaluation was conducted via Zoom using secure and encrypted videoconferencing technology. The patient was in a private location in the [facility].    The patient's identity was confirmed and verbal consent was obtained for this virtual visit.    Time at start of call: 08:01     Reason for Call:  Migraine    Patient Comments / History:   Rhea Baum is a .35 year old female with a hx of migraines. She also has a history of MS for which I will be starting her on Ocrevus soon. WE addressed her MS situation earlier this week.      Today Rhea Baum is on virtual visit and states the following headache symptoms that are affecting her quality of life.        Age at onset: Just this last year  Aura: can tell when it's coming on notice lights are very intense   Location: starts top of head then when its full blown then to back of eyes  Radiation: then radiates to top head like being hit by a hammer  Photophobia/Phonophobia/Nausea/Vomiting: blurred vision, sensitivity to light, + nausea  Physical Activity:  Yes when exercises it also comes on it speeds it up, even carrying daughter around  Frequency: 3-4 days a week  Headache Days/Month:  10-12  Quality:  Throbbing, pulsating pulse in head  Intensity: 10/10 sometimes at work and has to leave   Duration: several hours close to 24H  Triggers: isn't aware of triggers comes on randomly  Associated Symptoms:   Autonomic Signs (such as ptosis, miosis, conjunctival injection, rhinorrhea, increased lacrimation):  My eyes do water, stuffy  Family History: twin sister  Head Trauma: teenager had a mild concussion hit head in a haunted house, didn't go to  hospital but mother watched her closely  Association with Menses: no but does get mild head headaches  Sleep: depends on 3 year old daughter, usually 6-7 hours of sleep per night, daughter does come in at night and she rubs her back to go back to sleep, never feels rested, always wakes up at 5 o'clock no matter what  Caffeine Intake: 2 cups a day  ED Visits: no just deals with it   Hospitalizations:   Missed Work Days: don't suffers through it.    Medications Tried:     Tylenol  Advil  Can't afford imitrex              Labs / Images Reviewed   Results for FLORENCIO SINCLAIR (MRN [id number]) as of 1/22/2021 08:34   Ref. Range 1/21/2021 14:32 1/21/2021 14:34   WBC Latest Ref Range: 4.8 - 10.8 K/uL 7.9 7.7   RBC Latest Ref Range: 4.20 - 5.40 M/uL 4.29 4.28   Hemoglobin Latest Ref Range: 12.0 - 16.0 g/dL 13.5 13.4   Hematocrit Latest Ref Range: 37.0 - 47.0 % 41.6 41.3   MCV Latest Ref Range: 81.4 - 97.8 fL 97.0 96.5   MCH Latest Ref Range: 27.0 - 33.0 pg 31.5 31.3   MCHC Latest Ref Range: 33.6 - 35.0 g/dL 32.5 (L) 32.4 (L)   RDW Latest Ref Range: 35.9 - 50.0 fL 44.8 43.8   Platelet Count Latest Ref Range: 164 - 446 K/uL 318 333   MPV Latest Ref Range: 9.0 - 12.9 fL 10.8 11.3   Neutrophils-Polys Latest Ref Range: 44.00 - 72.00 %  66.10   Neutrophils (Absolute) Latest Ref Range: 2.00 - 7.15 K/uL  5.06   Lymphocytes Latest Ref Range: 22.00 - 41.00 %  25.80   Lymphs (Absolute) Latest Ref Range: 1.00 - 4.80 K/uL  1.98   Monocytes Latest Ref Range: 0.00 - 13.40 %  5.00   Monos (Absolute) Latest Ref Range: 0.00 - 0.85 K/uL  0.38   Eosinophils Latest Ref Range: 0.00 - 6.90 %  2.20   Eos (Absolute) Latest Ref Range: 0.00 - 0.51 K/uL  0.17   Basophils Latest Ref Range: 0.00 - 1.80 %  0.50   Baso (Absolute) Latest Ref Range: 0.00 - 0.12 K/uL  0.04   Immature Granulocytes Latest Ref Range: 0.00 - 0.90 %  0.40   Immature Granulocytes (abs) Latest Ref Range: 0.00 - 0.11 K/uL  0.03   Nucleated RBC Latest Units: /100 WBC  0.00   NRBC  (Absolute) Latest Units: K/uL  0.00   Sed Rate Westergren Latest Ref Range: 0 - 20 mm/hour 8    Sodium Latest Ref Range: 135 - 145 mmol/L 136    Potassium Latest Ref Range: 3.6 - 5.5 mmol/L 3.8    Chloride Latest Ref Range: 96 - 112 mmol/L 101    Co2 Latest Ref Range: 20 - 33 mmol/L 23    Anion Gap Latest Ref Range: 7.0 - 16.0  12.0    Glucose Latest Ref Range: 65 - 99 mg/dL 87    Bun Latest Ref Range: 8 - 22 mg/dL 10    Creatinine Latest Ref Range: 0.50 - 1.40 mg/dL 0.64    GFR If  Latest Ref Range: >60 mL/min/1.73 m 2 >60    GFR If Non  Latest Ref Range: >60 mL/min/1.73 m 2 >60    Calcium Latest Ref Range: 8.5 - 10.5 mg/dL 9.6    AST(SGOT) Latest Ref Range: 12 - 45 U/L 21    ALT(SGPT) Latest Ref Range: 2 - 50 U/L 25    Alkaline Phosphatase Latest Ref Range: 30 - 99 U/L 55    Total Bilirubin Latest Ref Range: 0.1 - 1.5 mg/dL 0.5    Albumin Latest Ref Range: 3.2 - 4.9 g/dL 4.5    Total Protein Latest Ref Range: 6.0 - 8.2 g/dL 7.2    Globulin Latest Ref Range: 1.9 - 3.5 g/dL 2.7    A-G Ratio Latest Units: g/dL 1.7    Fasting Status Unknown Fasting    Results for FLORENCIO SINCLAIR (MRN [id number]) as of 1/22/2021 08:34   Ref. Range 1/21/2021 14:32   Sodium Latest Ref Range: 135 - 145 mmol/L 136   Potassium Latest Ref Range: 3.6 - 5.5 mmol/L 3.8   Chloride Latest Ref Range: 96 - 112 mmol/L 101   Co2 Latest Ref Range: 20 - 33 mmol/L 23   Anion Gap Latest Ref Range: 7.0 - 16.0  12.0   Glucose Latest Ref Range: 65 - 99 mg/dL 87   Bun Latest Ref Range: 8 - 22 mg/dL 10   Creatinine Latest Ref Range: 0.50 - 1.40 mg/dL 0.64   GFR If  Latest Ref Range: >60 mL/min/1.73 m 2 >60   GFR If Non  Latest Ref Range: >60 mL/min/1.73 m 2 >60   Calcium Latest Ref Range: 8.5 - 10.5 mg/dL 9.6   AST(SGOT) Latest Ref Range: 12 - 45 U/L 21   ALT(SGPT) Latest Ref Range: 2 - 50 U/L 25   Alkaline Phosphatase Latest Ref Range: 30 - 99 U/L 55   Total Bilirubin Latest Ref Range: 0.1 - 1.5  mg/dL 0.5   Albumin Latest Ref Range: 3.2 - 4.9 g/dL 4.5   Total Protein Latest Ref Range: 6.0 - 8.2 g/dL 7.2   Globulin Latest Ref Range: 1.9 - 3.5 g/dL 2.7   A-G Ratio Latest Units: g/dL 1.7   Fasting Status Unknown Fasting     Results for FLORENCIO SINCLAIR (MRN [id number]) as of 1/22/2021 08:34   Ref. Range 1/21/2021 14:34   25-Hydroxy   Vitamin D 25 Latest Ref Range: 30 - 100 ng/mL 46   Vitamin B12 -True Cobalamin Latest Ref Range: 211 - 911 pg/mL 321   Hepatitis A Virus Ab, IgM Latest Ref Range: Non-Reactive  Non-Reactive   Hepatitis B Surface Antigen Latest Ref Range: Non-Reactive  Non-Reactive   Hepatitis B Cors Ab,IgM Latest Ref Range: Non-Reactive  Non-Reactive   Hepatitis C Antibody Latest Ref Range: Non-Reactive  Non-Reactive     Assessment and Plan:     1. Multiple sclerosis (HCC)  - nortriptyline (PAMELOR) 25 MG Cap; Take 1 Cap by mouth every day.  Dispense: 30 Cap; Refill: 10  - SUMAtriptan (IMITREX) 50 MG Tab; Take 1 Tab by mouth one time as needed for Migraine for up to 1 dose.  Dispense: 10 Tab; Refill: 3    Ocrevus placed in [facility] today.    2. Migraine aura without headache    3. Intractable migraine with aura with status migrainosus  - nortriptyline (PAMELOR) 25 MG Cap; Take 1 Cap by mouth every day.  Dispense: 30 Cap; Refill: 10  - SUMAtriptan (IMITREX) 50 MG Tab; Take 1 Tab by mouth one time as needed for Migraine for up to 1 dose.  Dispense: 10 Tab; Refill: 3      Follow-up: one month     Time at end of call: 08:36  Total Time Spent: 35 minutes  FADI Mckoy

## 2021-01-22 ENCOUNTER — TELEMEDICINE (OUTPATIENT)
Dept: NEUROLOGY | Facility: MEDICAL CENTER | Age: 36
End: 2021-01-22
Attending: REGISTERED NURSE
Payer: COMMERCIAL

## 2021-01-22 VITALS
BODY MASS INDEX: 18.44 KG/M2 | SYSTOLIC BLOOD PRESSURE: 90 MMHG | DIASTOLIC BLOOD PRESSURE: 60 MMHG | WEIGHT: 108 LBS | HEIGHT: 64 IN

## 2021-01-22 DIAGNOSIS — G35 MULTIPLE SCLEROSIS (HCC): ICD-10-CM

## 2021-01-22 DIAGNOSIS — G43.111 INTRACTABLE MIGRAINE WITH AURA WITH STATUS MIGRAINOSUS: ICD-10-CM

## 2021-01-22 DIAGNOSIS — G43.109 MIGRAINE AURA WITHOUT HEADACHE: ICD-10-CM

## 2021-01-22 LAB — ERYTHROCYTE [SEDIMENTATION RATE] IN BLOOD BY WESTERGREN METHOD: 8 MM/HOUR (ref 0–20)

## 2021-01-22 PROCEDURE — 99214 OFFICE O/P EST MOD 30 MIN: CPT | Mod: 95,CR | Performed by: REGISTERED NURSE

## 2021-01-22 RX ORDER — 0.9 % SODIUM CHLORIDE 0.9 %
3 VIAL (ML) INJECTION PRN
Status: CANCELLED | OUTPATIENT
Start: 2021-01-25

## 2021-01-22 RX ORDER — DIPHENHYDRAMINE HCL 25 MG
25 TABLET ORAL ONCE
Status: CANCELLED | OUTPATIENT
Start: 2021-01-25 | End: 2021-01-25

## 2021-01-22 RX ORDER — METHYLPREDNISOLONE SODIUM SUCCINATE 125 MG/2ML
100 INJECTION, POWDER, LYOPHILIZED, FOR SOLUTION INTRAMUSCULAR; INTRAVENOUS ONCE
Status: CANCELLED | OUTPATIENT
Start: 2021-01-25

## 2021-01-22 RX ORDER — 0.9 % SODIUM CHLORIDE 0.9 %
10 VIAL (ML) INJECTION PRN
Status: CANCELLED | OUTPATIENT
Start: 2021-01-25

## 2021-01-22 RX ORDER — ACETAMINOPHEN 325 MG/1
650 TABLET ORAL ONCE
Status: CANCELLED | OUTPATIENT
Start: 2021-01-25

## 2021-01-22 RX ORDER — NORTRIPTYLINE HYDROCHLORIDE 25 MG/1
25 CAPSULE ORAL DAILY
Qty: 30 CAP | Refills: 10 | Status: SHIPPED | OUTPATIENT
Start: 2021-01-22 | End: 2021-02-23

## 2021-01-22 RX ORDER — ONDANSETRON 2 MG/ML
8 INJECTION INTRAMUSCULAR; INTRAVENOUS EVERY 4 HOURS PRN
Status: CANCELLED | OUTPATIENT
Start: 2021-01-25

## 2021-01-22 RX ORDER — ONDANSETRON 4 MG/1
4 TABLET, ORALLY DISINTEGRATING ORAL EVERY 4 HOURS PRN
Status: CANCELLED | OUTPATIENT
Start: 2021-01-25

## 2021-01-22 RX ORDER — METHYLPREDNISOLONE SODIUM SUCCINATE 125 MG/2ML
125 INJECTION, POWDER, LYOPHILIZED, FOR SOLUTION INTRAMUSCULAR; INTRAVENOUS PRN
Status: CANCELLED | OUTPATIENT
Start: 2021-01-25

## 2021-01-22 RX ORDER — SUMATRIPTAN 50 MG/1
50 TABLET, FILM COATED ORAL
Qty: 10 TAB | Refills: 3 | Status: SHIPPED | OUTPATIENT
Start: 2021-01-22 | End: 2021-02-23

## 2021-01-22 RX ORDER — DIPHENHYDRAMINE HYDROCHLORIDE 50 MG/ML
50 INJECTION INTRAMUSCULAR; INTRAVENOUS PRN
Status: CANCELLED | OUTPATIENT
Start: 2021-01-25

## 2021-01-22 RX ORDER — EPINEPHRINE 1 MG/ML(1)
0.5 AMPUL (ML) INJECTION PRN
Status: CANCELLED | OUTPATIENT
Start: 2021-01-25

## 2021-01-22 RX ORDER — SODIUM CHLORIDE 9 MG/ML
INJECTION, SOLUTION INTRAVENOUS CONTINUOUS
Status: CANCELLED | OUTPATIENT
Start: 2021-01-25

## 2021-01-22 RX ORDER — HEPARIN SODIUM (PORCINE) LOCK FLUSH IV SOLN 100 UNIT/ML 100 UNIT/ML
500 SOLUTION INTRAVENOUS PRN
Status: CANCELLED | OUTPATIENT
Start: 2021-01-25

## 2021-01-22 RX ORDER — 0.9 % SODIUM CHLORIDE 0.9 %
VIAL (ML) INJECTION PRN
Status: CANCELLED | OUTPATIENT
Start: 2021-01-25

## 2021-01-22 ASSESSMENT — FIBROSIS 4 INDEX: FIB4 SCORE: 0.43

## 2021-01-23 LAB
ANNOTATION COMMENT IMP: NORMAL
CD19 CELLS NFR SPEC: 9 % (ref 6–23)
CD3 CELLS # BLD: 1642 CELLS/UL (ref 570–2400)
CD3 CELLS NFR SPEC: 85 % (ref 62–87)
CD3+CD4+ CELLS # BLD: 1064 CELLS/UL (ref 430–1800)
CD3+CD4+ CELLS NFR BLD: 55 % (ref 32–64)
CD3+CD4+ CELLS/CD3+CD8+ CLL BLD: 1.96 RATIO (ref 0.8–3.9)
CD3+CD8+ CELLS # BLD: 536 CELLS/UL (ref 210–1200)
CD3+CD8+ CELLS NFR SPEC: 28 % (ref 15–46)
CD3-CD16+CD56+ CELLS # SPEC: 103 CELLS/UL (ref 78–470)
CD3-CD16+CD56+ CELLS NFR SPEC: 5 % (ref 4–26)
CELLS.CD3-CD19+ [#/VOLUME] IN BLOOD: 179 CELLS/UL (ref 91–610)
IGA SERPL-MCNC: 181 MG/DL (ref 68–408)
IGG SERPL-MCNC: 1030 MG/DL (ref 768–1632)
IGM SERPL-MCNC: 66 MG/DL (ref 35–263)

## 2021-01-26 ENCOUNTER — TELEPHONE (OUTPATIENT)
Dept: NEUROLOGY | Facility: MEDICAL CENTER | Age: 36
End: 2021-01-26

## 2021-01-26 ENCOUNTER — DOCUMENTATION (OUTPATIENT)
Dept: NEUROLOGY | Facility: MEDICAL CENTER | Age: 36
End: 2021-01-26

## 2021-01-26 DIAGNOSIS — G35 MS (MULTIPLE SCLEROSIS) (HCC): ICD-10-CM

## 2021-02-20 DIAGNOSIS — Z72.0 TOBACCO ABUSE: ICD-10-CM

## 2021-02-23 ENCOUNTER — TELEMEDICINE (OUTPATIENT)
Dept: NEUROLOGY | Facility: MEDICAL CENTER | Age: 36
End: 2021-02-23
Attending: REGISTERED NURSE
Payer: COMMERCIAL

## 2021-02-23 VITALS — HEIGHT: 64 IN | BODY MASS INDEX: 18.54 KG/M2

## 2021-02-23 DIAGNOSIS — G43.519 MIGRAINE AURA, PERSISTENT, INTRACTABLE: ICD-10-CM

## 2021-02-23 DIAGNOSIS — G35 MULTIPLE SCLEROSIS (HCC): ICD-10-CM

## 2021-02-23 DIAGNOSIS — Z72.0 TOBACCO ABUSE: ICD-10-CM

## 2021-02-23 PROCEDURE — 99214 OFFICE O/P EST MOD 30 MIN: CPT | Mod: 95,CR | Performed by: REGISTERED NURSE

## 2021-02-23 RX ORDER — RIZATRIPTAN BENZOATE 5 MG/1
5 TABLET ORAL
Qty: 30 TABLET | Refills: 3 | Status: SHIPPED | OUTPATIENT
Start: 2021-02-23 | End: 2021-09-07

## 2021-02-23 NOTE — ASSESSMENT & PLAN NOTE
Awaiting Ocrevus infusion.  Was called for an appointment by our infusion center but wanted to talk to me first.

## 2021-02-23 NOTE — PROGRESS NOTES
Virtual Visit: Established Patient   This visit was conducted via Zoom using secure and encrypted videoconferencing technology. The patient was in a private location in the state of [place].    The patient's identity was confirmed and verbal consent was obtained for this virtual visit.    Subjective:   CC:   Chief Complaint   Patient presents with   • Follow-Up     migraines and MS     HX( from last appt)  Rhea Baum is a 35 y.o. female presenting for evaluation and management of: Migraine Headache.  Last visit placed on Nortryptline and imitrex.  Today is a follow up regarding those prescribed medications.   Hx of MS as well for which she will be starting Ocrevus for the first time.    Age at onset: Just this last year  Aura: can tell when it's coming on notice lights are very intense   Location: starts top of head then when its full blown then to back of eyes  Radiation: then radiates to top head like being hit by a hammer  Photophobia/Phonophobia/Nausea/Vomiting: blurred vision, sensitivity to light, + nausea  Physical Activity:  Yes when exercises it also comes on it speeds it up, even carrying daughter around  Frequency: 3-4 days a week  Headache Days/Month:  10-12  Quality:  Throbbing, pulsating pulse in head  Intensity: 10/10 sometimes at work and has to leave   Duration: several hours close to 24H  Triggers: isn't aware of triggers comes on randomly  Associated Symptoms:   Autonomic Signs (such as ptosis, miosis, conjunctival injection, rhinorrhea, increased lacrimation):  My eyes do water, stuffy  Family History: twin sister  Head Trauma: teenager had a mild concussion hit head in a haunted house, didn't go to hospital but mother watched her closely  Association with Menses: no but does get mild head headaches  Sleep: depends on 3 year old daughter, usually 6-7 hours of sleep per night, daughter does come in at night and she rubs her back to go back to sleep, never feels rested, always wakes up at 5  o'clock no matter what  Caffeine Intake: 2 cups a day  ED Visits: no just deals with it   Hospitalizations:   Missed Work Days: don't suffers through it.     Medications Tried:   Response  Tylenol: little relief  Advil: little relief   imitrex: makes her feel woozy almost as if she is drunk  Nortriptyline:  Panic attacks, chest pressure/pain/shakiness  Propanolol: Hypotensive at baseline 90's /50's    ROS   Denies any recent fevers or chills. No nausea or vomiting. No chest pains or shortness of breath.     Today Rhea presents with the following.    States nortriptyline gave her terrible panic attack at the beginning of last week.  She was shaking and had chest pain/pressure/anxiety for 1.5 hours.    Rhea also verbalizes that she is under a large amount of stress.  She recently lost her 16 year old cat and may be having to move as her twin sister whom they live with was promoted in her  job and will be moving to [place].       No Known Allergies    Current medicines (including changes today)  Current Outpatient Medications   Medication Sig Dispense Refill   • rizatriptan (MAXALT) 5 MG tablet Take 1 tablet by mouth one time as needed for Migraine for up to 1 dose. May repeat x1 after two hours if no relief 30 tablet 3   • varenicline (CHANTIX STARTING MONTH PAK) 0.5 MG X 11 & 1 MG X 42 tablet For first month (Patient not taking: Reported on 2/23/2021) 56 tablet 3   • ISIBLOOM 0.15-30 MG-MCG per tablet        No current facility-administered medications for this visit.       Patient Active Problem List    Diagnosis Date Noted   • Migraine aura, persistent, intractable 02/23/2021   • Smoker 01/20/2021   • Closed compression fracture of lumbosacral spine ([facility]) 09/23/2020   • Multiple sclerosis ([facility]) 09/26/2018   • Tobacco abuse 09/26/2018   • IUGR (intrauterine growth restriction) affecting care of mother 04/12/2017   • PSVT (paroxysmal supraventricular tachycardia) ([facility]) 12/01/2016   • Constipation due  "to neurogenic bowel 2016       Family History   Problem Relation Age of Onset   • Heart Disease Maternal Grandmother    • Diabetes Paternal Grandmother         Type 2   • No Known Problems Mother    • No Known Problems Sister    • No Known Problems Daughter        She  has a past medical history of Anxiety, GERD (gastroesophageal reflux disease), Multiple sclerosis during pregnancy ([facility]), and PSVT (paroxysmal supraventricular tachycardia) ([facility]). She also has no past medical history of Asthma or Kidney disease.  She  has a past surgical history that includes other cardiac surgery.       Objective:   Ht 1.626 m (5' 4\")   BMI 18.54 kg/m²     Physical Exam:  Constitutional: Alert, no distress, well-groomed.  Skin: No rashes in visible areas.  Eye: Round. Conjunctiva clear, lids normal. No icterus.   ENMT: Lips pink without lesions, good dentition, moist mucous membranes. Phonation normal.  Neck: No masses, no thyromegaly. Moves freely without pain.  Respiratory: Unlabored respiratory effort, no cough or audible wheeze    Psych: Alert and oriented x3, normal affect.  OTHER:  Under a lot of stress at this time.  Cat of 16 years recently .  Undergoing major life changes.  Her twin sister who she lives with is relocating and she and her family may need to find new living quarters and this is stressful for her and may be contributing to headaches.          Assessment and Plan:   Rhea Baum is a 35 year old female with a history of MS and Migraine headaches.  Remains with migraines that vary from 4/10 to 7-8/10.    Imitrex and Nortriptyline have produced unwanted side effects (see above).    Increased stressed at this time.     Might consider Botox in future. For now will prescribe Maxalt prn.      1. Multiple sclerosis (HCC):  Infusion Center has called her for an appointment for Ocrevus infusion    2. Tobacco abuse:  Chantix denied by her insurance. Asked her primary for a pre authorization    3. Migraine " "aura, persistent, intractable  Nortriptyline gave her panic attacks and imitrex makes her feel very \"woozie\" and is unable to take it at work.  Has taken in evening.    Maxalt 5mg po initial onset of headache and may repeat x1 after 2 hours if no relief.      Will continue Imitrex in pm for now and I will consider another therapy Mean while she will schedule Ocrevus infusion    Follow-up: No follow-ups on file.           "

## 2021-03-07 NOTE — TELEPHONE ENCOUNTER
Rhea HERNANDEZ [facility]rology [facility]   Phone Number: [phone]             Hi there.   I would like to get a referral to GI Dr. My MS related stomach issues have gotten much worse in the last couple of months. I quit smoking 2 months ago with chantix, & have been in non-stop pain ever since with bad, chronic constipation. Not sure the not smoking is connected but this new, more intense pain & inability to go to the bathroom started within a day or two of my quitting. My normal fiber & miralax is no longer working & neither are OTC stool softeners or maximum strength laxatives. I cut out the chantix as that causes constipation, no more junk food & started exercising quite a bit more, and that's not helping either. My stomach is so swollen, I look nine months pregnant at times.   Thank you for your help.   Rhea Baum.      Sent via [place].   Statement Selected

## 2021-03-16 ENCOUNTER — OUTPATIENT INFUSION SERVICES (OUTPATIENT)
Dept: ONCOLOGY | Facility: MEDICAL CENTER | Age: 36
End: 2021-03-16
Attending: PSYCHIATRY & NEUROLOGY
Payer: COMMERCIAL

## 2021-03-16 VITALS
BODY MASS INDEX: 20.16 KG/M2 | TEMPERATURE: 98.2 F | HEART RATE: 85 BPM | HEIGHT: 63 IN | SYSTOLIC BLOOD PRESSURE: 96 MMHG | RESPIRATION RATE: 18 BRPM | WEIGHT: 113.76 LBS | DIASTOLIC BLOOD PRESSURE: 64 MMHG | OXYGEN SATURATION: 98 %

## 2021-03-16 DIAGNOSIS — G35 MULTIPLE SCLEROSIS (HCC): ICD-10-CM

## 2021-03-16 LAB — HCG UR QL: NEGATIVE

## 2021-03-16 PROCEDURE — 306780 HCHG STAT FOR TRANSFUSION PER CASE

## 2021-03-16 PROCEDURE — 96413 CHEMO IV INFUSION 1 HR: CPT

## 2021-03-16 PROCEDURE — 96375 TX/PRO/DX INJ NEW DRUG ADDON: CPT

## 2021-03-16 PROCEDURE — 700105 HCHG RX REV CODE 258: Performed by: REGISTERED NURSE

## 2021-03-16 PROCEDURE — 81025 URINE PREGNANCY TEST: CPT

## 2021-03-16 PROCEDURE — 700111 HCHG RX REV CODE 636 W/ 250 OVERRIDE (IP): Performed by: REGISTERED NURSE

## 2021-03-16 PROCEDURE — 96415 CHEMO IV INFUSION ADDL HR: CPT

## 2021-03-16 PROCEDURE — A9270 NON-COVERED ITEM OR SERVICE: HCPCS | Performed by: REGISTERED NURSE

## 2021-03-16 PROCEDURE — 700102 HCHG RX REV CODE 250 W/ 637 OVERRIDE(OP): Performed by: REGISTERED NURSE

## 2021-03-16 RX ORDER — 0.9 % SODIUM CHLORIDE 0.9 %
3 VIAL (ML) INJECTION PRN
Status: CANCELLED | OUTPATIENT
Start: 2021-03-30

## 2021-03-16 RX ORDER — ACETAMINOPHEN 325 MG/1
650 TABLET ORAL ONCE
Status: COMPLETED | OUTPATIENT
Start: 2021-03-16 | End: 2021-03-16

## 2021-03-16 RX ORDER — DIPHENHYDRAMINE HCL 25 MG
25 TABLET ORAL ONCE
Status: CANCELLED | OUTPATIENT
Start: 2021-03-30 | End: 2021-03-30

## 2021-03-16 RX ORDER — METHYLPREDNISOLONE SODIUM SUCCINATE 125 MG/2ML
125 INJECTION, POWDER, LYOPHILIZED, FOR SOLUTION INTRAMUSCULAR; INTRAVENOUS PRN
Status: CANCELLED | OUTPATIENT
Start: 2021-03-30

## 2021-03-16 RX ORDER — HEPARIN SODIUM (PORCINE) LOCK FLUSH IV SOLN 100 UNIT/ML 100 UNIT/ML
500 SOLUTION INTRAVENOUS PRN
Status: CANCELLED | OUTPATIENT
Start: 2021-03-30

## 2021-03-16 RX ORDER — ONDANSETRON 2 MG/ML
8 INJECTION INTRAMUSCULAR; INTRAVENOUS EVERY 4 HOURS PRN
Status: CANCELLED | OUTPATIENT
Start: 2021-03-30

## 2021-03-16 RX ORDER — 0.9 % SODIUM CHLORIDE 0.9 %
10 VIAL (ML) INJECTION PRN
Status: CANCELLED | OUTPATIENT
Start: 2021-03-30

## 2021-03-16 RX ORDER — SODIUM CHLORIDE 9 MG/ML
INJECTION, SOLUTION INTRAVENOUS CONTINUOUS
Status: DISCONTINUED | OUTPATIENT
Start: 2021-03-16 | End: 2021-03-16 | Stop reason: HOSPADM

## 2021-03-16 RX ORDER — EPINEPHRINE 1 MG/ML(1)
0.5 AMPUL (ML) INJECTION PRN
Status: CANCELLED | OUTPATIENT
Start: 2021-03-30

## 2021-03-16 RX ORDER — 0.9 % SODIUM CHLORIDE 0.9 %
VIAL (ML) INJECTION PRN
Status: CANCELLED | OUTPATIENT
Start: 2021-03-30

## 2021-03-16 RX ORDER — METHYLPREDNISOLONE SODIUM SUCCINATE 125 MG/2ML
100 INJECTION, POWDER, LYOPHILIZED, FOR SOLUTION INTRAMUSCULAR; INTRAVENOUS ONCE
Status: CANCELLED | OUTPATIENT
Start: 2021-03-30

## 2021-03-16 RX ORDER — DIPHENHYDRAMINE HYDROCHLORIDE 50 MG/ML
50 INJECTION INTRAMUSCULAR; INTRAVENOUS PRN
Status: CANCELLED | OUTPATIENT
Start: 2021-03-30

## 2021-03-16 RX ORDER — DIPHENHYDRAMINE HCL 25 MG
25 TABLET ORAL ONCE
Status: COMPLETED | OUTPATIENT
Start: 2021-03-16 | End: 2021-03-16

## 2021-03-16 RX ORDER — METHYLPREDNISOLONE SODIUM SUCCINATE 125 MG/2ML
100 INJECTION, POWDER, LYOPHILIZED, FOR SOLUTION INTRAMUSCULAR; INTRAVENOUS ONCE
Status: COMPLETED | OUTPATIENT
Start: 2021-03-16 | End: 2021-03-16

## 2021-03-16 RX ORDER — SODIUM CHLORIDE 9 MG/ML
INJECTION, SOLUTION INTRAVENOUS CONTINUOUS
Status: CANCELLED | OUTPATIENT
Start: 2021-03-30

## 2021-03-16 RX ORDER — ONDANSETRON 4 MG/1
4 TABLET, ORALLY DISINTEGRATING ORAL EVERY 4 HOURS PRN
Status: CANCELLED | OUTPATIENT
Start: 2021-03-30

## 2021-03-16 RX ORDER — ACETAMINOPHEN 325 MG/1
650 TABLET ORAL ONCE
Status: CANCELLED | OUTPATIENT
Start: 2021-03-30

## 2021-03-16 RX ADMIN — DIPHENHYDRAMINE HYDROCHLORIDE 25 MG: 25 TABLET ORAL at 09:56

## 2021-03-16 RX ADMIN — OCRELIZUMAB 300 MG: 300 INJECTION INTRAVENOUS at 10:15

## 2021-03-16 RX ADMIN — SODIUM CHLORIDE: 9 INJECTION, SOLUTION INTRAVENOUS at 10:15

## 2021-03-16 RX ADMIN — METHYLPREDNISOLONE SODIUM SUCCINATE 100 MG: 125 INJECTION, POWDER, FOR SOLUTION INTRAMUSCULAR; INTRAVENOUS at 09:56

## 2021-03-16 RX ADMIN — ACETAMINOPHEN 650 MG: 325 TABLET ORAL at 09:56

## 2021-03-16 ASSESSMENT — FIBROSIS 4 INDEX: FIB4 SCORE: 0.43

## 2021-03-16 NOTE — PROGRESS NOTES
Pt arrived ambulatory to to [facility] for first dose Ocrevus.  POC discussed.  Pt denies active infections today.  POC Hcg collected as ordered, which is negative.  Pt appropriate for treatment today.  PIV started to LAC, blood return confirmed.  Premedications given, tolerated well.  Ocrevus titrated per policy followed by one hour observation time.  No adverse reaction noted.  PIV flushed, removed, and site covered with gauze and coban.  Next appointment confirmed.  Pt discharged from IS in NAD under self care.

## 2021-03-30 ENCOUNTER — OUTPATIENT INFUSION SERVICES (OUTPATIENT)
Dept: ONCOLOGY | Facility: MEDICAL CENTER | Age: 36
End: 2021-03-30
Attending: PSYCHIATRY & NEUROLOGY
Payer: COMMERCIAL

## 2021-03-30 VITALS
RESPIRATION RATE: 18 BRPM | BODY MASS INDEX: 19.57 KG/M2 | SYSTOLIC BLOOD PRESSURE: 102 MMHG | TEMPERATURE: 97.4 F | DIASTOLIC BLOOD PRESSURE: 66 MMHG | HEART RATE: 80 BPM | WEIGHT: 114.64 LBS | OXYGEN SATURATION: 100 % | HEIGHT: 64 IN

## 2021-03-30 DIAGNOSIS — G35 MULTIPLE SCLEROSIS (HCC): ICD-10-CM

## 2021-03-30 PROCEDURE — 96413 CHEMO IV INFUSION 1 HR: CPT

## 2021-03-30 PROCEDURE — 700111 HCHG RX REV CODE 636 W/ 250 OVERRIDE (IP): Performed by: REGISTERED NURSE

## 2021-03-30 PROCEDURE — 96375 TX/PRO/DX INJ NEW DRUG ADDON: CPT

## 2021-03-30 PROCEDURE — A9270 NON-COVERED ITEM OR SERVICE: HCPCS | Performed by: REGISTERED NURSE

## 2021-03-30 PROCEDURE — 700102 HCHG RX REV CODE 250 W/ 637 OVERRIDE(OP): Performed by: REGISTERED NURSE

## 2021-03-30 PROCEDURE — 700105 HCHG RX REV CODE 258: Performed by: REGISTERED NURSE

## 2021-03-30 PROCEDURE — 96415 CHEMO IV INFUSION ADDL HR: CPT

## 2021-03-30 PROCEDURE — 306780 HCHG STAT FOR TRANSFUSION PER CASE

## 2021-03-30 RX ORDER — ACETAMINOPHEN 325 MG/1
650 TABLET ORAL ONCE
Status: CANCELLED | OUTPATIENT
Start: 2021-11-03

## 2021-03-30 RX ORDER — ONDANSETRON 2 MG/ML
8 INJECTION INTRAMUSCULAR; INTRAVENOUS EVERY 4 HOURS PRN
Status: CANCELLED | OUTPATIENT
Start: 2021-11-03

## 2021-03-30 RX ORDER — METHYLPREDNISOLONE SODIUM SUCCINATE 125 MG/2ML
100 INJECTION, POWDER, LYOPHILIZED, FOR SOLUTION INTRAMUSCULAR; INTRAVENOUS ONCE
Status: COMPLETED | OUTPATIENT
Start: 2021-03-30 | End: 2021-03-30

## 2021-03-30 RX ORDER — ACETAMINOPHEN 325 MG/1
650 TABLET ORAL ONCE
Status: COMPLETED | OUTPATIENT
Start: 2021-03-30 | End: 2021-03-30

## 2021-03-30 RX ORDER — 0.9 % SODIUM CHLORIDE 0.9 %
VIAL (ML) INJECTION PRN
Status: CANCELLED | OUTPATIENT
Start: 2021-11-03

## 2021-03-30 RX ORDER — 0.9 % SODIUM CHLORIDE 0.9 %
3 VIAL (ML) INJECTION PRN
Status: CANCELLED | OUTPATIENT
Start: 2021-11-03

## 2021-03-30 RX ORDER — ONDANSETRON 4 MG/1
4 TABLET, ORALLY DISINTEGRATING ORAL EVERY 4 HOURS PRN
Status: CANCELLED | OUTPATIENT
Start: 2021-11-03

## 2021-03-30 RX ORDER — 0.9 % SODIUM CHLORIDE 0.9 %
10 VIAL (ML) INJECTION PRN
Status: CANCELLED | OUTPATIENT
Start: 2021-11-03

## 2021-03-30 RX ORDER — HEPARIN SODIUM (PORCINE) LOCK FLUSH IV SOLN 100 UNIT/ML 100 UNIT/ML
500 SOLUTION INTRAVENOUS PRN
Status: CANCELLED | OUTPATIENT
Start: 2021-11-03

## 2021-03-30 RX ORDER — METHYLPREDNISOLONE SODIUM SUCCINATE 125 MG/2ML
100 INJECTION, POWDER, LYOPHILIZED, FOR SOLUTION INTRAMUSCULAR; INTRAVENOUS ONCE
Status: CANCELLED | OUTPATIENT
Start: 2021-11-03

## 2021-03-30 RX ORDER — SODIUM CHLORIDE 9 MG/ML
INJECTION, SOLUTION INTRAVENOUS CONTINUOUS
Status: CANCELLED | OUTPATIENT
Start: 2021-11-03

## 2021-03-30 RX ORDER — DIPHENHYDRAMINE HCL 25 MG
25 TABLET ORAL ONCE
Status: CANCELLED | OUTPATIENT
Start: 2021-11-03 | End: 2021-11-03

## 2021-03-30 RX ORDER — METHYLPREDNISOLONE SODIUM SUCCINATE 125 MG/2ML
125 INJECTION, POWDER, LYOPHILIZED, FOR SOLUTION INTRAMUSCULAR; INTRAVENOUS PRN
Status: CANCELLED | OUTPATIENT
Start: 2021-11-03

## 2021-03-30 RX ORDER — DIPHENHYDRAMINE HYDROCHLORIDE 50 MG/ML
50 INJECTION INTRAMUSCULAR; INTRAVENOUS PRN
Status: CANCELLED | OUTPATIENT
Start: 2021-11-03

## 2021-03-30 RX ORDER — EPINEPHRINE 1 MG/ML(1)
0.5 AMPUL (ML) INJECTION PRN
Status: CANCELLED | OUTPATIENT
Start: 2021-11-03

## 2021-03-30 RX ORDER — POLYETHYLENE GLYCOL 3350 17 G/17G
17 POWDER, FOR SOLUTION ORAL DAILY
COMMUNITY
End: 2021-09-07

## 2021-03-30 RX ORDER — SODIUM CHLORIDE 9 MG/ML
INJECTION, SOLUTION INTRAVENOUS CONTINUOUS
Status: DISCONTINUED | OUTPATIENT
Start: 2021-03-30 | End: 2021-03-30 | Stop reason: HOSPADM

## 2021-03-30 RX ORDER — DIPHENHYDRAMINE HCL 25 MG
25 TABLET ORAL ONCE
Status: COMPLETED | OUTPATIENT
Start: 2021-03-30 | End: 2021-03-30

## 2021-03-30 RX ADMIN — ACETAMINOPHEN 650 MG: 325 TABLET ORAL at 09:57

## 2021-03-30 RX ADMIN — DIPHENHYDRAMINE HYDROCHLORIDE 25 MG: 25 TABLET ORAL at 09:57

## 2021-03-30 RX ADMIN — METHYLPREDNISOLONE SODIUM SUCCINATE 100 MG: 125 INJECTION, POWDER, FOR SOLUTION INTRAMUSCULAR; INTRAVENOUS at 09:58

## 2021-03-30 RX ADMIN — OCRELIZUMAB 300 MG: 300 INJECTION INTRAVENOUS at 10:06

## 2021-03-30 ASSESSMENT — FIBROSIS 4 INDEX: FIB4 SCORE: 0.43

## 2021-03-30 ASSESSMENT — PAIN DESCRIPTION - PAIN TYPE: TYPE: ACUTE PAIN

## 2021-03-30 NOTE — PROGRESS NOTES
Pt presents to [facility] for ocrelizumab. Established PIV in RAC; brisk blood return observed and pt tolerated well. Pre meds administered and ocrelizumab infused with filter per titration and with 1 hour of observation with no s/s of adverse reactions. PIV flushed and brisk blood return observed before removing; gauze/coband dressing applied. Next appointment confirmed and education provided. Pt discharged to self care with all personal belongings and in NAD.

## 2021-04-06 ENCOUNTER — PATIENT MESSAGE (OUTPATIENT)
Dept: NEUROLOGY | Facility: MEDICAL CENTER | Age: 36
End: 2021-04-06

## 2021-04-06 DIAGNOSIS — R14.0 ABDOMINAL DISTENTION: ICD-10-CM

## 2021-04-06 NOTE — PROGRESS NOTES
Patient had Ocrevus infusion #1 on 03/16/2021 and the 03/30/2021.  Sent pictures in of abdomen that reveals distention.  She states it occurred a couple of days after the infusion.  Her belly is swollen and tender.  She is not hungry but is eating oatmeal and salads for now.  She is up two pant sizes with a negative pregnancy test at infusion center.    Will obtain ultrasound.  Order placed.  Advised to go to ER of symptoms persist or worsen.    Honey FERNANDEZ

## 2021-04-08 ENCOUNTER — OFFICE VISIT (OUTPATIENT)
Dept: NEUROLOGY | Facility: MEDICAL CENTER | Age: 36
End: 2021-04-08
Attending: REGISTERED NURSE
Payer: COMMERCIAL

## 2021-04-08 VITALS
HEART RATE: 99 BPM | TEMPERATURE: 98.8 F | WEIGHT: 114.64 LBS | OXYGEN SATURATION: 100 % | BODY MASS INDEX: 19.57 KG/M2 | DIASTOLIC BLOOD PRESSURE: 60 MMHG | SYSTOLIC BLOOD PRESSURE: 100 MMHG | HEIGHT: 64 IN

## 2021-04-08 DIAGNOSIS — R10.11 RIGHT UPPER QUADRANT PAIN: ICD-10-CM

## 2021-04-08 DIAGNOSIS — R14.0 ABDOMINAL DISTENTION: ICD-10-CM

## 2021-04-08 DIAGNOSIS — G35 MS (MULTIPLE SCLEROSIS) (HCC): ICD-10-CM

## 2021-04-08 PROCEDURE — 99211 OFF/OP EST MAY X REQ PHY/QHP: CPT | Performed by: REGISTERED NURSE

## 2021-04-08 PROCEDURE — 99215 OFFICE O/P EST HI 40 MIN: CPT | Performed by: REGISTERED NURSE

## 2021-04-08 ASSESSMENT — FIBROSIS 4 INDEX: FIB4 SCORE: 0.43

## 2021-04-08 NOTE — PROGRESS NOTES
"Subjective:      Rhea Baum is a 35 y.o. female who presents with swelling to abdomen s/p Ocrevus infusion 03/16/2020.  She is unable to get ultrasound appointment until 04/20/ HPI  Rhea Baum os a 35 year old female with a history of MS.  States that 3 days post Ocrevus infusion she started to have abdominal swelling.  Is uncomfortable and tender when she moves about at times.  Left upper quadrant pain with palpation  and generalized tenderness to palpation throughout lower abdomen.  Denies alcohol ingestion and change in diet.  States BM's are normal      ROS   ROS:   Constitutional: No fevers or chills. Over all thin in appearance with distended abdomen.  BMI 19.68  Eyes: No blurry vision or eye pain.  ENT: No dysphagia or hearing loss.  Respiratory: No cough or shortness of breath.  Cardiovascular: No chest pain or palpitations.  GI: Abdomen distended with hypoactive bowel sounds.  Right upper quadrant pain with palpation and generalized discomfort with palpation throughout entire abdomen.  : No urinary incontinence or dysuria.  Musculoskeletal: No joint swelling or arthralgias.  Skin: No skin rashes.  Neuro: No headaches, dizziness, or tremors.  Endocrine: No heat or cold intolerance. No polydipsia or polyuria.  Psych: No depression or anxiety.  Heme/Lymph: No easy bruising or swollen lymph nodes.  All other review of systems were reviewed and were negative         Objective:     /60 (BP Location: Left arm, Patient Position: Sitting, BP Cuff Size: Adult)   Pulse 99   Temp 37.1 °C (98.8 °F) (Temporal)   Ht 1.626 m (5' 4\")   Wt 52 kg (114 lb 10.2 oz)   SpO2 100%   BMI 19.68 kg/m²        Physical Exam   Constitutional: She is oriented to person, place, and time. She appears distressed.   HENT:   Head: Normocephalic.   Eyes: Pupils are equal, round, and reactive to light.   Cardiovascular: Normal rate and normal heart sounds.   Pulmonary/Chest: Effort normal.   Abdominal: She exhibits " distension. There is abdominal tenderness.   Musculoskeletal:         General: Normal range of motion.      Cervical back: Normal range of motion.   Neurological: She is alert and oriented to person, place, and time.   Skin: Skin is warm and dry.   Psychiatric: Affect normal.               Assessment/Plan:   Rhea Baum is a 35 year old female with MS.  Had Ocrevus first infusion on 03/16/2021 and states three days later she experienced abdominal swelling that did not go away.  She is unable to get abdominal ultrasound until April 20th.  She does have right upper quadrant pain and generalized tenderness with ascites like appearance.  Normally her hip bones are exposed per patient.   Will get CT scan r/o adverse reaction to Ocrevus/Ascites etc.           Plan:  1. Right upper quadrant pain      - CBC WITH DIFFERENTIAL; Future  - Comp Metabolic Panel; Future    2. MS (multiple sclerosis) (HCC)    - HCG QUAL SERUM; Future    3. Abdominal distention  CT scan state    My total time spent caring for the patient on the day of the encounter was 40  minutes.   This does not include time spent on separately billable procedures/tests.

## 2021-04-09 ENCOUNTER — HOSPITAL ENCOUNTER (OUTPATIENT)
Dept: RADIOLOGY | Facility: MEDICAL CENTER | Age: 36
End: 2021-04-09
Attending: REGISTERED NURSE
Payer: COMMERCIAL

## 2021-04-09 DIAGNOSIS — R10.11 RIGHT UPPER QUADRANT PAIN: ICD-10-CM

## 2021-04-09 PROCEDURE — 74177 CT ABD & PELVIS W/CONTRAST: CPT

## 2021-04-09 PROCEDURE — 700117 HCHG RX CONTRAST REV CODE 255: Performed by: REGISTERED NURSE

## 2021-04-09 RX ADMIN — IOHEXOL 100 ML: 350 INJECTION, SOLUTION INTRAVENOUS at 16:22

## 2021-04-09 RX ADMIN — IOHEXOL 25 ML: 240 INJECTION, SOLUTION INTRATHECAL; INTRAVASCULAR; INTRAVENOUS; ORAL at 16:22

## 2021-05-03 ENCOUNTER — PATIENT OUTREACH (OUTPATIENT)
Dept: SCHEDULING | Facility: IMAGING CENTER | Age: 36
End: 2021-05-03

## 2021-05-03 NOTE — PROGRESS NOTES
Attempt #1    Outreach for rescheduling covid vaccine appt.    Outreach Outcome spoke to pt. & rescheduled     Transfer to [phone] if patient calls back to schedule appointment.

## 2021-09-07 ENCOUNTER — APPOINTMENT (OUTPATIENT)
Dept: RADIOLOGY | Facility: IMAGING CENTER | Age: 36
End: 2021-09-07
Attending: PHYSICIAN ASSISTANT
Payer: COMMERCIAL

## 2021-09-07 ENCOUNTER — OFFICE VISIT (OUTPATIENT)
Dept: URGENT CARE | Facility: PHYSICIAN GROUP | Age: 36
End: 2021-09-07
Payer: COMMERCIAL

## 2021-09-07 VITALS
HEIGHT: 64 IN | WEIGHT: 118.6 LBS | SYSTOLIC BLOOD PRESSURE: 108 MMHG | TEMPERATURE: 98.8 F | HEART RATE: 105 BPM | DIASTOLIC BLOOD PRESSURE: 68 MMHG | BODY MASS INDEX: 20.25 KG/M2 | OXYGEN SATURATION: 100 % | RESPIRATION RATE: 16 BRPM

## 2021-09-07 DIAGNOSIS — M79.672 FOOT PAIN, LEFT: ICD-10-CM

## 2021-09-07 PROCEDURE — 99213 OFFICE O/P EST LOW 20 MIN: CPT | Performed by: PHYSICIAN ASSISTANT

## 2021-09-07 PROCEDURE — 73630 X-RAY EXAM OF FOOT: CPT | Mod: TC,LT | Performed by: PHYSICIAN ASSISTANT

## 2021-09-07 RX ORDER — ACETAMINOPHEN AND CODEINE PHOSPHATE 120; 12 MG/5ML; MG/5ML
SOLUTION ORAL
COMMUNITY
Start: 2021-07-24 | End: 2021-09-07

## 2021-09-07 ASSESSMENT — ENCOUNTER SYMPTOMS
DOUBLE VISION: 0
TINGLING: 0
BRUISES/BLEEDS EASILY: 0
DIZZINESS: 0
CHILLS: 0
BLURRED VISION: 0
FEVER: 0
LOSS OF CONSCIOUSNESS: 0
MYALGIAS: 1
FALLS: 1
HEADACHES: 0

## 2021-09-07 ASSESSMENT — FIBROSIS 4 INDEX: FIB4 SCORE: 0.43

## 2021-09-07 NOTE — PROGRESS NOTES
Subjective:   Rhea Baum is a 35 y.o. female who presents for Foot Injury (L foot pain, difficulty bending toes, pt was kicking sister when she accidentally injured herself, x2 days )      HPI:  This is a very pleasant 35-year-old female presenting to the clinic with left foot pain x2 days.  Patient states her and her sister were playing around.  They were wearing socks and kicking each other.  She states she slipped on the hardwood floor and sustained hyperextension of her toes on her left foot.  She sustained immediate pain.  States she has had mild swelling and bruising over the left foot second third and fourth digits. She has tenderness to palpation.  States it is very painful to walk barefoot.  Pain does improve when she is walking in a hard soled shoe.  Denies any numbness or tingling distally.  Denies any pain over the midfoot.  Denies any pain to the ankle or knee.  OTC Tylenol and ibuprofen for pain which provides mild improvement.    Review of Systems   Constitutional: Negative for chills, fever and malaise/fatigue.   Eyes: Negative for blurred vision and double vision.   Musculoskeletal: Positive for falls, joint pain and myalgias.   Skin:        Bruising to the left foot   Neurological: Negative for dizziness, tingling, loss of consciousness and headaches.   Endo/Heme/Allergies: Does not bruise/bleed easily.       Medications:    • Chantix Starting Month Reid Tabs  • Isibloom Tabs  • MULTIPLE VITAMINS/WOMENS PO  • rizatriptan    Allergies: Patient has no known allergies.    Problem List: Rhea Baum does not have any pertinent problems on file.    Surgical History:  Past Surgical History:   Procedure Laterality Date   • OTHER CARDIAC SURGERY      Age 8        Past Social Hx: Rhea Baum  reports that she has been smoking cigarettes. She has a 0.75 pack-year smoking history. She has never used smokeless tobacco. She reports current alcohol use. She reports that she does not use drugs.     Past  "Family Hx:  Rhea Baum family history includes Diabetes in her paternal grandmother; Heart Disease in her maternal grandmother; No Known Problems in her daughter, mother, and sister.     Problem list, medications, and allergies reviewed by myself today in Epic.     Objective:     /68 (BP Location: Left arm, Patient Position: Sitting, BP Cuff Size: Adult)   Pulse (!) 105   Temp 37.1 °C (98.8 °F) (Temporal)   Resp 16   Ht 1.626 m (5' 4\")   Wt 53.8 kg (118 lb 9.6 oz)   SpO2 100%   BMI 20.36 kg/m²     Physical Exam  Constitutional:       General: She is not in acute distress.     Appearance: Normal appearance. She is not ill-appearing, toxic-appearing or diaphoretic.   HENT:      Head: Normocephalic and atraumatic.   Eyes:      Conjunctiva/sclera: Conjunctivae normal.   Cardiovascular:      Rate and Rhythm: Normal rate and regular rhythm.      Pulses: Normal pulses.      Heart sounds: Normal heart sounds.   Pulmonary:      Effort: Pulmonary effort is normal.      Breath sounds: Normal breath sounds.   Musculoskeletal:      Cervical back: Normal range of motion.      Left foot: Tenderness and bony tenderness present.        Feet:       Comments: Left foot: There is mild edema and discoloration over the second third and fourth MTP joints.  Mild tenderness to palpation.  Patient demonstrates full range of motion of all digits.  No tenderness to palpation of the midfoot.  Full and pain-free ankle range of motion.  Slowed gait.   Skin:     General: Skin is warm.      Capillary Refill: Capillary refill takes less than 2 seconds.   Neurological:      General: No focal deficit present.      Mental Status: She is alert and oriented to person, place, and time. Mental status is at baseline.       RADIOLOGY RESULTS   DX-FOOT-COMPLETE 3+ LEFT    Result Date: 9/7/2021 9/7/2021 9:04 AM HISTORY/REASON FOR EXAM:  Pain/Deformity Following Trauma; L 2-4 MTP ttp Wrestling injury 2 days ago, hyperextension of LEFT toes, " pain at the 2nd 3rd and 4th metatarsals. TECHNIQUE/EXAM DESCRIPTION AND NUMBER OF VIEWS: 3 views of the LEFT foot. COMPARISON:  None. FINDINGS: No focal soft tissue swelling. No radiopaque foreign body. No fracture or dislocation.     No fracture or dislocation of LEFT foot.           Assessment/Plan:     Comments/MDM:     • No evidence of fracture or bony abnormality appreciated on x-ray.  • Supportive care recommendations discussed at this time.  • Ice and elevation encouraged to decrease pain and swelling.  • Alternate Tylenol and ibuprofen for pain.  • Walk in a stiff soled shoe for the next 1 to 2 weeks.  Gradually increase activity as tolerated.  • Return for any persistence or worsening of symptoms.     Diagnosis and associated orders:     1. Foot pain, left  DX-FOOT-COMPLETE 3+ LEFT            Differential diagnosis, natural history, supportive care, and indications for immediate follow-up discussed.    Advised the patient to follow-up with the primary care physician for recheck, reevaluation, and consideration of further management.    Please note that this dictation was created using voice recognition software. I have made reasonable attempt to correct obvious errors, but I expect that there are errors of grammar and possibly content that I did not discover before finalizing the note.    This note was electronically signed by JONO Darden PA-C

## 2021-09-29 RX ORDER — DIPHENHYDRAMINE HCL 25 MG
50 TABLET ORAL ONCE
Status: CANCELLED | OUTPATIENT
Start: 2021-11-03 | End: 2021-11-03

## 2021-10-13 ENCOUNTER — OUTPATIENT INFUSION SERVICES (OUTPATIENT)
Dept: ONCOLOGY | Facility: MEDICAL CENTER | Age: 36
End: 2021-10-13
Attending: REGISTERED NURSE
Payer: COMMERCIAL

## 2021-10-13 VITALS
TEMPERATURE: 97.5 F | SYSTOLIC BLOOD PRESSURE: 102 MMHG | BODY MASS INDEX: 51.91 KG/M2 | RESPIRATION RATE: 18 BRPM | HEIGHT: 63 IN | HEART RATE: 89 BPM | DIASTOLIC BLOOD PRESSURE: 69 MMHG | WEIGHT: 293 LBS | OXYGEN SATURATION: 100 %

## 2021-10-13 DIAGNOSIS — G35 MULTIPLE SCLEROSIS (HCC): ICD-10-CM

## 2021-10-13 PROCEDURE — 96413 CHEMO IV INFUSION 1 HR: CPT

## 2021-10-13 PROCEDURE — 700102 HCHG RX REV CODE 250 W/ 637 OVERRIDE(OP): Performed by: REGISTERED NURSE

## 2021-10-13 PROCEDURE — A9270 NON-COVERED ITEM OR SERVICE: HCPCS | Performed by: REGISTERED NURSE

## 2021-10-13 PROCEDURE — 700111 HCHG RX REV CODE 636 W/ 250 OVERRIDE (IP): Performed by: REGISTERED NURSE

## 2021-10-13 PROCEDURE — 96375 TX/PRO/DX INJ NEW DRUG ADDON: CPT

## 2021-10-13 PROCEDURE — 700105 HCHG RX REV CODE 258: Performed by: REGISTERED NURSE

## 2021-10-13 PROCEDURE — 96415 CHEMO IV INFUSION ADDL HR: CPT

## 2021-10-13 RX ORDER — ONDANSETRON 2 MG/ML
8 INJECTION INTRAMUSCULAR; INTRAVENOUS EVERY 4 HOURS PRN
Status: CANCELLED | OUTPATIENT
Start: 2021-11-03

## 2021-10-13 RX ORDER — POLYETHYLENE GLYCOL 3350 17 G/17G
17 POWDER, FOR SOLUTION ORAL DAILY
COMMUNITY

## 2021-10-13 RX ORDER — 0.9 % SODIUM CHLORIDE 0.9 %
10 VIAL (ML) INJECTION PRN
Status: CANCELLED | OUTPATIENT
Start: 2021-11-03

## 2021-10-13 RX ORDER — EPINEPHRINE 1 MG/ML(1)
0.5 AMPUL (ML) INJECTION PRN
Status: CANCELLED | OUTPATIENT
Start: 2021-11-03

## 2021-10-13 RX ORDER — DIPHENHYDRAMINE HYDROCHLORIDE 50 MG/ML
50 INJECTION INTRAMUSCULAR; INTRAVENOUS PRN
Status: CANCELLED | OUTPATIENT
Start: 2021-11-03

## 2021-10-13 RX ORDER — METHYLPREDNISOLONE SODIUM SUCCINATE 125 MG/2ML
100 INJECTION, POWDER, LYOPHILIZED, FOR SOLUTION INTRAMUSCULAR; INTRAVENOUS ONCE
Status: CANCELLED | OUTPATIENT
Start: 2021-11-03

## 2021-10-13 RX ORDER — DIPHENHYDRAMINE HCL 25 MG
50 TABLET ORAL ONCE
Status: COMPLETED | OUTPATIENT
Start: 2021-10-13 | End: 2021-10-13

## 2021-10-13 RX ORDER — 0.9 % SODIUM CHLORIDE 0.9 %
VIAL (ML) INJECTION PRN
Status: CANCELLED | OUTPATIENT
Start: 2021-11-03

## 2021-10-13 RX ORDER — HEPARIN SODIUM (PORCINE) LOCK FLUSH IV SOLN 100 UNIT/ML 100 UNIT/ML
500 SOLUTION INTRAVENOUS PRN
Status: CANCELLED | OUTPATIENT
Start: 2021-11-03

## 2021-10-13 RX ORDER — 0.9 % SODIUM CHLORIDE 0.9 %
3 VIAL (ML) INJECTION PRN
Status: CANCELLED | OUTPATIENT
Start: 2021-11-03

## 2021-10-13 RX ORDER — SODIUM CHLORIDE 9 MG/ML
INJECTION, SOLUTION INTRAVENOUS CONTINUOUS
Status: CANCELLED | OUTPATIENT
Start: 2021-11-03

## 2021-10-13 RX ORDER — ONDANSETRON 4 MG/1
4 TABLET, ORALLY DISINTEGRATING ORAL EVERY 4 HOURS PRN
Status: CANCELLED | OUTPATIENT
Start: 2021-11-03

## 2021-10-13 RX ORDER — METHYLPREDNISOLONE SODIUM SUCCINATE 125 MG/2ML
100 INJECTION, POWDER, LYOPHILIZED, FOR SOLUTION INTRAMUSCULAR; INTRAVENOUS ONCE
Status: COMPLETED | OUTPATIENT
Start: 2021-10-13 | End: 2021-10-13

## 2021-10-13 RX ORDER — DIPHENHYDRAMINE HCL 25 MG
50 TABLET ORAL ONCE
Status: CANCELLED | OUTPATIENT
Start: 2021-11-03 | End: 2021-11-03

## 2021-10-13 RX ORDER — METHYLPREDNISOLONE SODIUM SUCCINATE 125 MG/2ML
125 INJECTION, POWDER, LYOPHILIZED, FOR SOLUTION INTRAMUSCULAR; INTRAVENOUS PRN
Status: CANCELLED | OUTPATIENT
Start: 2021-11-03

## 2021-10-13 RX ORDER — ACETAMINOPHEN 325 MG/1
650 TABLET ORAL ONCE
Status: COMPLETED | OUTPATIENT
Start: 2021-10-13 | End: 2021-10-13

## 2021-10-13 RX ORDER — ACETAMINOPHEN 325 MG/1
650 TABLET ORAL ONCE
Status: CANCELLED | OUTPATIENT
Start: 2021-11-03

## 2021-10-13 RX ADMIN — ACETAMINOPHEN 650 MG: 325 TABLET ORAL at 09:18

## 2021-10-13 RX ADMIN — OCRELIZUMAB 600 MG: 300 INJECTION INTRAVENOUS at 09:49

## 2021-10-13 RX ADMIN — DIPHENHYDRAMINE HYDROCHLORIDE 50 MG: 25 TABLET ORAL at 09:19

## 2021-10-13 RX ADMIN — METHYLPREDNISOLONE SODIUM SUCCINATE 100 MG: 125 INJECTION, POWDER, FOR SOLUTION INTRAMUSCULAR; INTRAVENOUS at 09:19

## 2021-10-13 ASSESSMENT — FIBROSIS 4 INDEX: FIB4 SCORE: 0.43

## 2021-10-13 NOTE — PROGRESS NOTES
Patient arrived ambulatory to IS for Ocrevus.  Patient denies any s/s of infection or fevers.  PIV established with good blood return noted.  Pre medications given and OCrevus infused per order, patient tolerated well.  Patient monitored for 1 hour post with no s/s reaction noted.  PIV flushed, removed, and gauze/coban placed.  Next appointment scheduled and patient ambulated out of clinic in no apparent distress.

## 2022-01-27 RX ORDER — METHYLPREDNISOLONE SODIUM SUCCINATE 125 MG/2ML
100 INJECTION, POWDER, LYOPHILIZED, FOR SOLUTION INTRAMUSCULAR; INTRAVENOUS ONCE
Status: CANCELLED | OUTPATIENT
Start: 2022-05-02

## 2022-01-27 RX ORDER — METHYLPREDNISOLONE SODIUM SUCCINATE 125 MG/2ML
125 INJECTION, POWDER, LYOPHILIZED, FOR SOLUTION INTRAMUSCULAR; INTRAVENOUS PRN
Status: CANCELLED | OUTPATIENT
Start: 2022-05-02

## 2022-01-27 RX ORDER — ONDANSETRON 2 MG/ML
8 INJECTION INTRAMUSCULAR; INTRAVENOUS EVERY 4 HOURS PRN
Status: CANCELLED | OUTPATIENT
Start: 2022-05-02

## 2022-01-27 RX ORDER — 0.9 % SODIUM CHLORIDE 0.9 %
10 VIAL (ML) INJECTION PRN
Status: CANCELLED | OUTPATIENT
Start: 2022-05-02

## 2022-01-27 RX ORDER — ACETAMINOPHEN 325 MG/1
650 TABLET ORAL ONCE
Status: CANCELLED | OUTPATIENT
Start: 2022-05-02

## 2022-01-27 RX ORDER — EPINEPHRINE 1 MG/ML(1)
0.5 AMPUL (ML) INJECTION PRN
Status: CANCELLED | OUTPATIENT
Start: 2022-05-02

## 2022-01-27 RX ORDER — ONDANSETRON 4 MG/1
4 TABLET, ORALLY DISINTEGRATING ORAL EVERY 4 HOURS PRN
Status: CANCELLED | OUTPATIENT
Start: 2022-05-02

## 2022-01-27 RX ORDER — DIPHENHYDRAMINE HYDROCHLORIDE 50 MG/ML
50 INJECTION INTRAMUSCULAR; INTRAVENOUS PRN
Status: CANCELLED | OUTPATIENT
Start: 2022-05-02

## 2022-01-27 RX ORDER — 0.9 % SODIUM CHLORIDE 0.9 %
VIAL (ML) INJECTION PRN
Status: CANCELLED | OUTPATIENT
Start: 2022-05-02

## 2022-01-27 RX ORDER — 0.9 % SODIUM CHLORIDE 0.9 %
3 VIAL (ML) INJECTION PRN
Status: CANCELLED | OUTPATIENT
Start: 2022-05-02

## 2022-01-27 RX ORDER — HEPARIN SODIUM (PORCINE) LOCK FLUSH IV SOLN 100 UNIT/ML 100 UNIT/ML
500 SOLUTION INTRAVENOUS PRN
Status: CANCELLED | OUTPATIENT
Start: 2022-05-02

## 2022-01-27 RX ORDER — SODIUM CHLORIDE 9 MG/ML
INJECTION, SOLUTION INTRAVENOUS CONTINUOUS
Status: CANCELLED | OUTPATIENT
Start: 2022-05-02

## 2022-04-14 ENCOUNTER — OUTPATIENT INFUSION SERVICES (OUTPATIENT)
Dept: ONCOLOGY | Facility: MEDICAL CENTER | Age: 37
End: 2022-04-14
Attending: REGISTERED NURSE
Payer: COMMERCIAL

## 2022-04-14 VITALS
WEIGHT: 120.15 LBS | OXYGEN SATURATION: 98 % | SYSTOLIC BLOOD PRESSURE: 113 MMHG | HEIGHT: 64 IN | TEMPERATURE: 99 F | DIASTOLIC BLOOD PRESSURE: 74 MMHG | BODY MASS INDEX: 20.51 KG/M2 | RESPIRATION RATE: 18 BRPM | HEART RATE: 87 BPM

## 2022-04-14 DIAGNOSIS — G35 MULTIPLE SCLEROSIS (HCC): ICD-10-CM

## 2022-04-14 PROCEDURE — 96413 CHEMO IV INFUSION 1 HR: CPT

## 2022-04-14 PROCEDURE — 700102 HCHG RX REV CODE 250 W/ 637 OVERRIDE(OP): Performed by: REGISTERED NURSE

## 2022-04-14 PROCEDURE — A9270 NON-COVERED ITEM OR SERVICE: HCPCS | Performed by: REGISTERED NURSE

## 2022-04-14 PROCEDURE — 700105 HCHG RX REV CODE 258: Performed by: REGISTERED NURSE

## 2022-04-14 PROCEDURE — 700111 HCHG RX REV CODE 636 W/ 250 OVERRIDE (IP): Performed by: REGISTERED NURSE

## 2022-04-14 PROCEDURE — 96415 CHEMO IV INFUSION ADDL HR: CPT

## 2022-04-14 PROCEDURE — 96375 TX/PRO/DX INJ NEW DRUG ADDON: CPT

## 2022-04-14 RX ORDER — SODIUM CHLORIDE 9 MG/ML
INJECTION, SOLUTION INTRAVENOUS CONTINUOUS
Status: DISCONTINUED | OUTPATIENT
Start: 2022-04-14 | End: 2022-04-14 | Stop reason: HOSPADM

## 2022-04-14 RX ORDER — HEPARIN SODIUM (PORCINE) LOCK FLUSH IV SOLN 100 UNIT/ML 100 UNIT/ML
500 SOLUTION INTRAVENOUS PRN
Status: CANCELLED | OUTPATIENT
Start: 2022-05-02

## 2022-04-14 RX ORDER — METHYLPREDNISOLONE SODIUM SUCCINATE 125 MG/2ML
100 INJECTION, POWDER, LYOPHILIZED, FOR SOLUTION INTRAMUSCULAR; INTRAVENOUS ONCE
Status: CANCELLED | OUTPATIENT
Start: 2022-05-02

## 2022-04-14 RX ORDER — METHYLPREDNISOLONE SODIUM SUCCINATE 125 MG/2ML
125 INJECTION, POWDER, LYOPHILIZED, FOR SOLUTION INTRAMUSCULAR; INTRAVENOUS PRN
Status: CANCELLED | OUTPATIENT
Start: 2022-05-02

## 2022-04-14 RX ORDER — EPINEPHRINE 1 MG/ML(1)
0.5 AMPUL (ML) INJECTION PRN
Status: CANCELLED | OUTPATIENT
Start: 2022-05-02

## 2022-04-14 RX ORDER — 0.9 % SODIUM CHLORIDE 0.9 %
3 VIAL (ML) INJECTION PRN
Status: CANCELLED | OUTPATIENT
Start: 2022-05-02

## 2022-04-14 RX ORDER — 0.9 % SODIUM CHLORIDE 0.9 %
VIAL (ML) INJECTION PRN
Status: CANCELLED | OUTPATIENT
Start: 2022-05-02

## 2022-04-14 RX ORDER — ONDANSETRON 4 MG/1
4 TABLET, ORALLY DISINTEGRATING ORAL EVERY 4 HOURS PRN
Status: CANCELLED | OUTPATIENT
Start: 2022-05-02

## 2022-04-14 RX ORDER — ACETAMINOPHEN 325 MG/1
650 TABLET ORAL ONCE
Status: COMPLETED | OUTPATIENT
Start: 2022-04-14 | End: 2022-04-14

## 2022-04-14 RX ORDER — METHYLPREDNISOLONE SODIUM SUCCINATE 125 MG/2ML
100 INJECTION, POWDER, LYOPHILIZED, FOR SOLUTION INTRAMUSCULAR; INTRAVENOUS ONCE
Status: COMPLETED | OUTPATIENT
Start: 2022-04-14 | End: 2022-04-14

## 2022-04-14 RX ORDER — ONDANSETRON 2 MG/ML
8 INJECTION INTRAMUSCULAR; INTRAVENOUS EVERY 4 HOURS PRN
Status: CANCELLED | OUTPATIENT
Start: 2022-05-02

## 2022-04-14 RX ORDER — DIPHENHYDRAMINE HCL 25 MG
50 TABLET ORAL ONCE
Status: CANCELLED | OUTPATIENT
Start: 2022-05-02 | End: 2022-05-02

## 2022-04-14 RX ORDER — 0.9 % SODIUM CHLORIDE 0.9 %
10 VIAL (ML) INJECTION PRN
Status: CANCELLED | OUTPATIENT
Start: 2022-05-02

## 2022-04-14 RX ORDER — DIPHENHYDRAMINE HCL 25 MG
50 TABLET ORAL ONCE
Status: COMPLETED | OUTPATIENT
Start: 2022-04-14 | End: 2022-04-14

## 2022-04-14 RX ORDER — SODIUM CHLORIDE 9 MG/ML
INJECTION, SOLUTION INTRAVENOUS CONTINUOUS
Status: CANCELLED | OUTPATIENT
Start: 2022-05-02

## 2022-04-14 RX ORDER — ACETAMINOPHEN 325 MG/1
650 TABLET ORAL ONCE
Status: CANCELLED | OUTPATIENT
Start: 2022-05-02

## 2022-04-14 RX ORDER — DIPHENHYDRAMINE HYDROCHLORIDE 50 MG/ML
50 INJECTION INTRAMUSCULAR; INTRAVENOUS PRN
Status: CANCELLED | OUTPATIENT
Start: 2022-05-02

## 2022-04-14 RX ADMIN — DIPHENHYDRAMINE HCL 50 MG: 25 TABLET ORAL at 09:29

## 2022-04-14 RX ADMIN — ACETAMINOPHEN 650 MG: 325 TABLET ORAL at 09:29

## 2022-04-14 RX ADMIN — METHYLPREDNISOLONE SODIUM SUCCINATE 100 MG: 125 INJECTION, POWDER, FOR SOLUTION INTRAMUSCULAR; INTRAVENOUS at 09:30

## 2022-04-14 RX ADMIN — SODIUM CHLORIDE: 9 INJECTION, SOLUTION INTRAVENOUS at 09:25

## 2022-04-14 RX ADMIN — OCRELIZUMAB 600 MG: 300 INJECTION INTRAVENOUS at 10:00

## 2022-04-14 ASSESSMENT — FIBROSIS 4 INDEX: FIB4 SCORE: 0.44

## 2022-04-14 NOTE — PROGRESS NOTES
Pt arrives to IS for Ocrevus infusion for MS.  Pt denies s/s of infection or worsening of MS symptoms.  PIV established to R-AC.  Pre-medications given.  Ocrevus given pre pharmacy titration rates.  Max rate of 300 ml/hr.  Ocrevus completed without issue.  One hour observation completed without adverse reaction.  PIV flushed with NS and site removed.  Site wrapped with pressure dressing.  Confirmed next appt time with pt.  Pt dc home to self care.

## 2022-04-20 ENCOUNTER — HOSPITAL ENCOUNTER (EMERGENCY)
Facility: MEDICAL CENTER | Age: 37
End: 2022-04-20
Attending: EMERGENCY MEDICINE
Payer: COMMERCIAL

## 2022-04-20 ENCOUNTER — APPOINTMENT (OUTPATIENT)
Dept: RADIOLOGY | Facility: MEDICAL CENTER | Age: 37
End: 2022-04-20
Attending: EMERGENCY MEDICINE
Payer: COMMERCIAL

## 2022-04-20 VITALS
TEMPERATURE: 98.6 F | WEIGHT: 118.17 LBS | HEIGHT: 64 IN | HEART RATE: 108 BPM | RESPIRATION RATE: 16 BRPM | BODY MASS INDEX: 20.17 KG/M2 | SYSTOLIC BLOOD PRESSURE: 103 MMHG | DIASTOLIC BLOOD PRESSURE: 77 MMHG | OXYGEN SATURATION: 98 %

## 2022-04-20 DIAGNOSIS — U07.1 COVID-19: ICD-10-CM

## 2022-04-20 DIAGNOSIS — I95.1 ORTHOSTATIC SYNCOPE: ICD-10-CM

## 2022-04-20 LAB
ALBUMIN SERPL BCP-MCNC: 4.7 G/DL (ref 3.2–4.9)
ALBUMIN/GLOB SERPL: 2.1 G/DL
ALP SERPL-CCNC: 55 U/L (ref 30–99)
ALT SERPL-CCNC: 17 U/L (ref 2–50)
ANION GAP SERPL CALC-SCNC: 11 MMOL/L (ref 7–16)
AST SERPL-CCNC: 18 U/L (ref 12–45)
BASOPHILS # BLD AUTO: 0.2 % (ref 0–1.8)
BASOPHILS # BLD: 0.01 K/UL (ref 0–0.12)
BILIRUB SERPL-MCNC: 0.5 MG/DL (ref 0.1–1.5)
BUN SERPL-MCNC: 11 MG/DL (ref 8–22)
CALCIUM SERPL-MCNC: 8.9 MG/DL (ref 8.5–10.5)
CHLORIDE SERPL-SCNC: 104 MMOL/L (ref 96–112)
CO2 SERPL-SCNC: 21 MMOL/L (ref 20–33)
CREAT SERPL-MCNC: 0.63 MG/DL (ref 0.5–1.4)
D DIMER PPP IA.FEU-MCNC: <0.27 UG/ML (FEU) (ref 0–0.5)
EKG IMPRESSION: NORMAL
EOSINOPHIL # BLD AUTO: 0.01 K/UL (ref 0–0.51)
EOSINOPHIL NFR BLD: 0.2 % (ref 0–6.9)
ERYTHROCYTE [DISTWIDTH] IN BLOOD BY AUTOMATED COUNT: 42.5 FL (ref 35.9–50)
GFR SERPLBLD CREATININE-BSD FMLA CKD-EPI: 118 ML/MIN/1.73 M 2
GLOBULIN SER CALC-MCNC: 2.2 G/DL (ref 1.9–3.5)
GLUCOSE SERPL-MCNC: 83 MG/DL (ref 65–99)
HCG SERPL QL: NEGATIVE
HCT VFR BLD AUTO: 40.1 % (ref 37–47)
HGB BLD-MCNC: 13.5 G/DL (ref 12–16)
IMM GRANULOCYTES # BLD AUTO: 0.02 K/UL (ref 0–0.11)
IMM GRANULOCYTES NFR BLD AUTO: 0.5 % (ref 0–0.9)
LYMPHOCYTES # BLD AUTO: 0.96 K/UL (ref 1–4.8)
LYMPHOCYTES NFR BLD: 23.2 % (ref 22–41)
MCH RBC QN AUTO: 31.1 PG (ref 27–33)
MCHC RBC AUTO-ENTMCNC: 33.7 G/DL (ref 33.6–35)
MCV RBC AUTO: 92.4 FL (ref 81.4–97.8)
MONOCYTES # BLD AUTO: 0.57 K/UL (ref 0–0.85)
MONOCYTES NFR BLD AUTO: 13.8 % (ref 0–13.4)
NEUTROPHILS # BLD AUTO: 2.56 K/UL (ref 2–7.15)
NEUTROPHILS NFR BLD: 62.1 % (ref 44–72)
NRBC # BLD AUTO: 0 K/UL
NRBC BLD-RTO: 0 /100 WBC
PLATELET # BLD AUTO: 239 K/UL (ref 164–446)
PMV BLD AUTO: 10.5 FL (ref 9–12.9)
POTASSIUM SERPL-SCNC: 4.1 MMOL/L (ref 3.6–5.5)
PROT SERPL-MCNC: 6.9 G/DL (ref 6–8.2)
RBC # BLD AUTO: 4.34 M/UL (ref 4.2–5.4)
SODIUM SERPL-SCNC: 136 MMOL/L (ref 135–145)
TROPONIN T SERPL-MCNC: <6 NG/L (ref 6–19)
WBC # BLD AUTO: 4.1 K/UL (ref 4.8–10.8)

## 2022-04-20 PROCEDURE — 85379 FIBRIN DEGRADATION QUANT: CPT

## 2022-04-20 PROCEDURE — 84703 CHORIONIC GONADOTROPIN ASSAY: CPT

## 2022-04-20 PROCEDURE — 93005 ELECTROCARDIOGRAM TRACING: CPT

## 2022-04-20 PROCEDURE — 99285 EMERGENCY DEPT VISIT HI MDM: CPT

## 2022-04-20 PROCEDURE — 700105 HCHG RX REV CODE 258: Performed by: EMERGENCY MEDICINE

## 2022-04-20 PROCEDURE — 93005 ELECTROCARDIOGRAM TRACING: CPT | Performed by: EMERGENCY MEDICINE

## 2022-04-20 PROCEDURE — 36415 COLL VENOUS BLD VENIPUNCTURE: CPT

## 2022-04-20 PROCEDURE — 85025 COMPLETE CBC W/AUTO DIFF WBC: CPT

## 2022-04-20 PROCEDURE — 84484 ASSAY OF TROPONIN QUANT: CPT

## 2022-04-20 PROCEDURE — 71045 X-RAY EXAM CHEST 1 VIEW: CPT

## 2022-04-20 PROCEDURE — M0222 HCHG BEBTELOVIMAB ADMINISTRATION: HCPCS

## 2022-04-20 PROCEDURE — 700111 HCHG RX REV CODE 636 W/ 250 OVERRIDE (IP): Performed by: EMERGENCY MEDICINE

## 2022-04-20 PROCEDURE — 80053 COMPREHEN METABOLIC PANEL: CPT

## 2022-04-20 RX ORDER — SODIUM CHLORIDE 9 MG/ML
500 INJECTION, SOLUTION INTRAVENOUS ONCE
Status: DISCONTINUED | OUTPATIENT
Start: 2022-04-20 | End: 2022-04-20

## 2022-04-20 RX ORDER — SODIUM CHLORIDE 9 MG/ML
1000 INJECTION, SOLUTION INTRAVENOUS ONCE
Status: COMPLETED | OUTPATIENT
Start: 2022-04-20 | End: 2022-04-20

## 2022-04-20 RX ORDER — BEBTELOVIMAB 87.5 MG/ML
175 INJECTION, SOLUTION INTRAVENOUS ONCE
Status: COMPLETED | OUTPATIENT
Start: 2022-04-20 | End: 2022-04-20

## 2022-04-20 RX ADMIN — SODIUM CHLORIDE 1000 ML: 9 INJECTION, SOLUTION INTRAVENOUS at 17:15

## 2022-04-20 RX ADMIN — BEBTELOVIMAB 175 MG: 87.5 INJECTION, SOLUTION INTRAVENOUS at 17:54

## 2022-04-20 ASSESSMENT — FIBROSIS 4 INDEX: FIB4 SCORE: 0.44

## 2022-04-20 NOTE — ED PROVIDER NOTES
ED Provider Note    CHIEF COMPLAINT  Chief Complaint   Patient presents with   • Congestion     X 2 days, pt had positive home covid test 2 days ago after exposure to covid 5 days ago. Pt receives infusions for MS treatment and states she is immunocompromised because of this   • Fever     X 2 days, highest 101.4F at home   • Near Syncopal     X 1 this AM, dizzy x 20 minutes, pt laid on floor until near syncope and dizziness 'passed'        HPI  Rhea Baum is a 36 y.o. female with MS, GERD, and SVT who presents complaining of COVID symptoms and syncope.    Patient states that she was exposed to COVID 5 days ago and had a positive home test 2 days ago.  She has been having nasal congestion and fever for the last 2 days.  She felt faint/dizzy this morning and laid on the floor.  She states she felt sweaty and clammy before this happened and she lost consciousness for a few seconds.  At some point during the day today she also felt a fluttering in her chest and skipped beats.  She has had a headache and myalgias.  Her last fever was documented at 1-1.4 yesterday evening.  She reports nausea and mild diarrhea.  Patient denies calf pain, leg swelling, history of PE/DVT, hemoptysis, recent travel/immobilization, urinary symptoms.  Patient does take OCPs.      ALLERGIES  No Known Allergies    CURRENT MEDICATIONS  Home Medications     Reviewed by Gonzalez Pavon R.N. (Registered Nurse) on 04/20/22 at 1411  Med List Status: <None>   Medication Last Dose Status   ISIBLOOM 0.15-30 MG-MCG per tablet  Active   Multiple Vitamins-Minerals (MULTIPLE VITAMINS/WOMENS PO)  Active   polyethylene glycol/lytes (MIRALAX) 17 g Pack  Active                PAST MEDICAL HISTORY   has a past medical history of Anxiety, GERD (gastroesophageal reflux disease), Multiple sclerosis during pregnancy (HCC), and PSVT (paroxysmal supraventricular tachycardia) ([facility]).    SURGICAL HISTORY   has a past surgical history that includes other cardiac  "surgery.    SOCIAL HISTORY  Social History     Tobacco Use   • Smoking status: Current Every Day Smoker     Packs/day: 1.00     Years: 0.75     Pack years: 0.75     Types: Cigarettes   • Smokeless tobacco: Never Used   Vaping Use   • Vaping Use: Never used   Substance and Sexual Activity   • Alcohol use: Yes     Comment: occ   • Drug use: No   • Sexual activity: Yes     Partners: Male     Birth control/protection: Pill       Family Hx:  No family history of PE/DVT    REVIEW OF SYSTEMS  See HPI for further details.  All other systems are negative except as above in HPI.      PHYSICAL EXAM  VITAL SIGNS: /79   Pulse (!) 103   Temp 37 °C (98.6 °F) (Temporal)   Resp 16   Ht 1.626 m (5' 4\")   Wt 53.6 kg (118 lb 2.7 oz)   SpO2 98%   BMI 20.28 kg/m²     General:  WDWN female, nontoxic appearing in NAD; A+Ox3; V/S as above; tachycardic at triage, afebrile, not hypoxic or hypotensive  Skin: warm and dry; good color; no rash  HEENT: NCAT; EOMs intact; PERRL; no scleral icterus   Neck: FROM; soft  Cardiovascular: Tachycardic heart rate and regular rhythm.  No murmurs, rubs, or gallops; pulses 2+ bilaterally radially  Lungs: Clear to auscultation with good air movement bilaterally.  No wheezes, rhonchi, or rales.   Abdomen: BS present; soft; NTND; no rebound, guarding, or rigidity.  No organomegaly or pulsatile mass  Extremities: PRUITT x 4; no e/o trauma; no pedal edema; neg Uziel's  Neurologic: CNs III-XII grossly intact; speech clear; distal sensation intact; strength 5/5 UE/LEs  Psychiatric: Appropriate affect, normal mood    LABS  Results for orders placed or performed during the hospital encounter of 04/20/22   CBC with Differential   Result Value Ref Range    WBC 4.1 (L) 4.8 - 10.8 K/uL    RBC 4.34 4.20 - 5.40 M/uL    Hemoglobin 13.5 12.0 - 16.0 g/dL    Hematocrit 40.1 37.0 - 47.0 %    MCV 92.4 81.4 - 97.8 fL    MCH 31.1 27.0 - 33.0 pg    MCHC 33.7 33.6 - 35.0 g/dL    RDW 42.5 35.9 - 50.0 fL    Platelet Count " 239 164 - 446 K/uL    MPV 10.5 9.0 - 12.9 fL    Neutrophils-Polys 62.10 44.00 - 72.00 %    Lymphocytes 23.20 22.00 - 41.00 %    Monocytes 13.80 (H) 0.00 - 13.40 %    Eosinophils 0.20 0.00 - 6.90 %    Basophils 0.20 0.00 - 1.80 %    Immature Granulocytes 0.50 0.00 - 0.90 %    Nucleated RBC 0.00 /100 WBC    Neutrophils (Absolute) 2.56 2.00 - 7.15 K/uL    Lymphs (Absolute) 0.96 (L) 1.00 - 4.80 K/uL    Monos (Absolute) 0.57 0.00 - 0.85 K/uL    Eos (Absolute) 0.01 0.00 - 0.51 K/uL    Baso (Absolute) 0.01 0.00 - 0.12 K/uL    Immature Granulocytes (abs) 0.02 0.00 - 0.11 K/uL    NRBC (Absolute) 0.00 K/uL   Complete Metabolic Panel (CMP)   Result Value Ref Range    Sodium 136 135 - 145 mmol/L    Potassium 4.1 3.6 - 5.5 mmol/L    Chloride 104 96 - 112 mmol/L    Co2 21 20 - 33 mmol/L    Anion Gap 11.0 7.0 - 16.0    Glucose 83 65 - 99 mg/dL    Bun 11 8 - 22 mg/dL    Creatinine 0.63 0.50 - 1.40 mg/dL    Calcium 8.9 8.5 - 10.5 mg/dL    AST(SGOT) 18 12 - 45 U/L    ALT(SGPT) 17 2 - 50 U/L    Alkaline Phosphatase 55 30 - 99 U/L    Total Bilirubin 0.5 0.1 - 1.5 mg/dL    Albumin 4.7 3.2 - 4.9 g/dL    Total Protein 6.9 6.0 - 8.2 g/dL    Globulin 2.2 1.9 - 3.5 g/dL    A-G Ratio 2.1 g/dL   Troponin   Result Value Ref Range    Troponin T <6 6 - 19 ng/L   ESTIMATED GFR   Result Value Ref Range    GFR (CKD-EPI) 118 >60 mL/min/1.73 m 2   D-DIMER   Result Value Ref Range    D-Dimer Screen <0.27 0.00 - 0.50 ug/mL (FEU)   BETA-HCG QUALITATIVE SERUM   Result Value Ref Range    Beta-Hcg Qualitative Serum Negative Negative   EKG   Result Value Ref Range    Report       [facility] Emergency Dept.    Test Date:  2022  Pt Name:    LFORENCIO SINCLAIR                Department: ER  MRN:        [id number]                      Room:  Gender:     Female                       Technician: [id number]  :        1985                   Requested By:ER TRIAGE PROTOCOL  Order #:    [id number]                    Reading MD: SHARI BECERRIL,  MD    Measurements  Intervals                                Axis  Rate:       82                           P:          49  OR:         122                          QRS:        52  QRSD:       81                           T:          37  QT:         351  QTc:        410    Interpretive Statements  Sinus rhythm  Baseline wander in lead(s) V3  Compared to ECG 12/12/2016 17:25:00  No significant changes  Electronically Signed On 4- 16:23:05 PDT by SHARI BECERRIL MD         IMAGING  DX-CHEST-PORTABLE (1 VIEW)   Final Result      No acute cardiopulmonary process is seen.          MEDICAL RECORD  I have reviewed patient's medical record and pertinent results are listed below.      COURSE & MEDICAL DECISION MAKING  I have reviewed any medical record information, laboratory studies and radiographic results as noted.    Rhea Baum is a 36 y.o. female who presents complaining of being COVID-positive and near syncopal.    Appropriate PPE was worn at all times while interacting with the patient.    Protocol labs were ordered at triage.  D-dimer was added.    EKG demonstrated sinus rhythm without tachycardia or acute ST changes.  No delta wave, QT prolongation, or OR shortening was noted.    Orthostatics revealed an increase in heart rate from 106 to 124 from sitting to standing and a drop in blood pressure of 127 to 112 from sitting to standing.  I suspect this was the cause of the patient's syncope today.  Normal saline bolus was ordered for this.    I discussed the case with Dr. Velasquez from neurology who recommends antibody infusion over Paxlovid.  Patient may follow-up with her nurse practitioner in neurology clinic.    Chest x-ray was negative for acute pathology, including COVID-pneumonia.  D-dimer was negative making PE unlikely to be the cause of syncope and tachycardia.    Bebtelomivab was ordered.  Patient will wait for reevaluation following antibody infusion and normal saline bolus.    D-dimer and  hCG are both negative.    7:09 PM  Orthostatics were repeated.  Patient's heart rate with standing is now 108, down from 120s.  I feel any remaining tachycardia is likely due to the COVID infection itself and may not fully resolve but is definitely improved.  Patient was reevaluated earlier and felt improved with fluids and was amenable to being discharged.  We discussed return precautions.    The total critical care time on this patient is 40 minutes, resuscitating patient, speaking with consulting pharmacist and neurologist, and deciphering test results. This 40 minutes is exclusive of separately billable procedures.      IMPRESSION  1. COVID-19     2. Orthostatic syncope       Electronically signed by: Grisel Buchanan M.D., 4/20/2022 4:23 PM

## 2022-04-20 NOTE — ED TRIAGE NOTES
"Chief Complaint   Patient presents with   • Congestion     X 2 days, pt had positive home covid test 2 days ago after exposure to covid 5 days ago. Pt receives infusions for MS treatment and states she is immunocompromised because of this   • Fever     X 2 days, highest 101.4F at home   • Near Syncopal     X 1 this AM, dizzy x 20 minutes, pt laid on floor until near syncope and dizziness 'passed'      Pt ambulatory to triage for above complaints, VSs on RA, GCS 15, NAD.    Pt states hx of SVT when she was a child and is currently feeling palpitations/pressure. EKG performed.    Pt returned to [facility]. Educated on triage process and to inform staff of any changes.     /77   Pulse (!) 120   Temp 36.7 °C (98 °F) (Temporal)   Resp 16   Ht 1.626 m (5' 4\")   Wt 53.6 kg (118 lb 2.7 oz)   SpO2 100%   BMI 20.28 kg/m²     "

## 2022-04-20 NOTE — ED TRIAGE NOTES
Agreed with triage note. Pt ambulates to TCS with a steady gait. Pt made aware to contact staff if she needs any assistance and where staff members would be. Pt currently AOx4 GCS 15.

## 2022-04-20 NOTE — ED NOTES
Pt resting comfortably. Pt is AOx4 GCS 15. Pt is aware to let staff know of any changes in current condition. Pt able to ambulate w/o any assistance.

## 2022-04-21 NOTE — DISCHARGE INSTRUCTIONS
Return to the ER for chest pain, shortness of breath, fainting, or other concerns.    Drink plenty of fluids to stay hydrated as we think this contributed to your fainting today.    Please call your neurologist tomorrow and advise them that you were in the ER and received antibodies for COVID.

## 2022-09-27 ENCOUNTER — OFFICE VISIT (OUTPATIENT)
Dept: URGENT CARE | Facility: PHYSICIAN GROUP | Age: 37
End: 2022-09-27
Payer: COMMERCIAL

## 2022-09-27 VITALS
BODY MASS INDEX: 20.49 KG/M2 | RESPIRATION RATE: 16 BRPM | TEMPERATURE: 99.1 F | WEIGHT: 120 LBS | HEIGHT: 64 IN | DIASTOLIC BLOOD PRESSURE: 68 MMHG | SYSTOLIC BLOOD PRESSURE: 108 MMHG | OXYGEN SATURATION: 100 % | HEART RATE: 95 BPM

## 2022-09-27 DIAGNOSIS — J02.9 PHARYNGITIS, UNSPECIFIED ETIOLOGY: ICD-10-CM

## 2022-09-27 DIAGNOSIS — J06.9 URI WITH COUGH AND CONGESTION: ICD-10-CM

## 2022-09-27 LAB
EXTERNAL QUALITY CONTROL: NORMAL
INT CON NEG: NEGATIVE
INT CON NEG: NEGATIVE
INT CON POS: POSITIVE
INT CON POS: POSITIVE
S PYO AG THROAT QL: NEGATIVE
SARS-COV+SARS-COV-2 AG RESP QL IA.RAPID: NEGATIVE

## 2022-09-27 PROCEDURE — 99213 OFFICE O/P EST LOW 20 MIN: CPT | Performed by: NURSE PRACTITIONER

## 2022-09-27 PROCEDURE — 87880 STREP A ASSAY W/OPTIC: CPT | Performed by: NURSE PRACTITIONER

## 2022-09-27 PROCEDURE — 87426 SARSCOV CORONAVIRUS AG IA: CPT | Performed by: NURSE PRACTITIONER

## 2022-09-27 RX ORDER — BENZONATATE 100 MG/1
100 CAPSULE ORAL 3 TIMES DAILY PRN
Qty: 30 CAPSULE | Refills: 0 | Status: SHIPPED | OUTPATIENT
Start: 2022-09-27 | End: 2023-05-04

## 2022-09-27 ASSESSMENT — ENCOUNTER SYMPTOMS
NAUSEA: 0
VOMITING: 0
DIARRHEA: 0
WHEEZING: 0
SPUTUM PRODUCTION: 0
SORE THROAT: 1
COUGH: 1
HEMOPTYSIS: 0
SHORTNESS OF BREATH: 0
FEVER: 0

## 2022-09-27 ASSESSMENT — FIBROSIS 4 INDEX: FIB4 SCORE: 0.64

## 2022-09-27 NOTE — PROGRESS NOTES
Subjective:     Rhea Baum is a 36 y.o. female who presents for Cough (Very intense cough and sore throat, hard to swallow, nasal congestion, x1 week )      Started last Wednesday. Taking dayquil. Had COVID in April. No known strep exposure. Daughter was sent home today for vomiting.    Cough  This is a new problem. The current episode started in the past 7 days. Associated symptoms include ear pain, nasal congestion and a sore throat. Pertinent negatives include no fever, hemoptysis, shortness of breath or wheezing.   Pharyngitis   Neither side of throat is experiencing more pain than the other. The pain is at a severity of 6/10. Associated symptoms include congestion, coughing and ear pain. Pertinent negatives include no diarrhea, drooling, ear discharge, shortness of breath or vomiting.     Past Medical History:   Diagnosis Date    Anxiety     GERD (gastroesophageal reflux disease)     Multiple sclerosis during pregnancy ([facility])     PSVT (paroxysmal supraventricular tachycardia) ([facility])        Past Surgical History:   Procedure Laterality Date    OTHER CARDIAC SURGERY      Age 8        Social History     Socioeconomic History    Marital status:      Spouse name: Not on file    Number of children: Not on file    Years of education: Not on file    Highest education level: Associate degree: occupational, technical, or vocational program   Occupational History    Not on file   Tobacco Use    Smoking status: Former     Packs/day: 1.00     Years: 0.75     Pack years: 0.75     Types: Cigarettes     Quit date:      Years since quittin.7    Smokeless tobacco: Never   Vaping Use    Vaping Use: Never used   Substance and Sexual Activity    Alcohol use: Yes     Comment: occ    Drug use: No    Sexual activity: Yes     Partners: Male     Birth control/protection: Pill   Other Topics Concern    Not on file   Social History Narrative    Not on file     Social Determinants of Health     Financial Resource  "Strain: Not on file   Food Insecurity: Not on file   Transportation Needs: Not on file   Physical Activity: Not on file   Stress: Not on file   Social Connections: Not on file   Intimate Partner Violence: Not on file   Housing Stability: Not on file        Family History   Problem Relation Age of Onset    Heart Disease Maternal Grandmother     Diabetes Paternal Grandmother         Type 2    No Known Problems Mother     No Known Problems Sister     No Known Problems Daughter         No Known Allergies    Review of Systems   Constitutional:  Positive for malaise/fatigue. Negative for fever.   HENT:  Positive for congestion, ear pain and sore throat. Negative for drooling and ear discharge.    Respiratory:  Positive for cough. Negative for hemoptysis, sputum production, shortness of breath and wheezing.    Gastrointestinal:  Negative for diarrhea, nausea and vomiting.   All other systems reviewed and are negative.     Objective:   /68 (BP Location: Right arm, Patient Position: Sitting, BP Cuff Size: Adult)   Pulse 95   Temp 37.3 °C (99.1 °F) (Temporal)   Resp 16   Ht 1.626 m (5' 4\")   Wt 54.4 kg (120 lb)   SpO2 100%   BMI 20.60 kg/m²     Physical Exam  Vitals reviewed.   Constitutional:       General: She is not in acute distress.     Appearance: She is well-developed. She is ill-appearing. She is not toxic-appearing.   HENT:      Head: Normocephalic and atraumatic.      Right Ear: Tympanic membrane, ear canal and external ear normal.      Left Ear: Tympanic membrane, ear canal and external ear normal.      Nose: Congestion present.      Mouth/Throat:      Lips: Pink.      Mouth: Mucous membranes are moist.      Pharynx: Uvula midline. Posterior oropharyngeal erythema present.      Tonsils: No tonsillar exudate.   Eyes:      Conjunctiva/sclera: Conjunctivae normal.   Cardiovascular:      Rate and Rhythm: Normal rate.   Pulmonary:      Effort: Pulmonary effort is normal. No accessory muscle usage, prolonged " expiration, respiratory distress or retractions.      Breath sounds: Normal breath sounds. No stridor. No decreased breath sounds, wheezing, rhonchi or rales.      Comments: Cough noted.   Musculoskeletal:         General: Normal range of motion.      Cervical back: Normal range of motion and neck supple.   Lymphadenopathy:      Cervical: No cervical adenopathy.   Skin:     General: Skin is warm and dry.      Findings: No rash.   Neurological:      General: No focal deficit present.      Mental Status: She is alert and oriented to person, place, and time.      GCS: GCS eye subscore is 4. GCS verbal subscore is 5. GCS motor subscore is 6.   Psychiatric:         Mood and Affect: Mood normal.         Speech: Speech normal.         Behavior: Behavior normal.         Thought Content: Thought content normal.         Judgment: Judgment normal.       Assessment/Plan:   1. URI with cough and congestion  - POCT SARS-COV Antigen KYMBERLY (Symptomatic only)  - benzonatate (TESSALON) 100 MG Cap; Take 1 Capsule by mouth 3 times a day as needed for Cough.  Dispense: 30 Capsule; Refill: 0    2. Pharyngitis, unspecified etiology  - POCT Rapid Strep A  Results for orders placed or performed in visit on 09/27/22   POCT SARS-COV Antigen KYMBERLY (Symptomatic only)   Result Value Ref Range    Internal  Valid     SARS-COV ANTIGEN KYMBERLY Negative Negative, Indeterminate, None Detected, Not Detected, Detected, NotDetected, Valid, Invalid, Pass    Internal Control Positive Positive     Internal Control Negative Negative    POCT Rapid Strep A   Result Value Ref Range    Rapid Strep Screen Negative     Internal Control Positive Positive     Internal Control Negative Negative    Symptomatic care.  -Oral hydration and rest.   -Cough control: nonpharmacologic options for cough relief such as throat lozenges, hot tea, honey.  -Over the counter expectorant as directed; Guaifenesin (Mucinex).  -Tylenol or ibuprofen for pain and fever as directed.    -Warm salt water gargles.  -OTC Throat lozenges or spray (Cepacol).    Seek emergency medical care immediately for: Trouble breathing, persistent pain or pressure in the chest, confusion, inability to wake or stay awake, bluish lips or face, persistent tachycardia (fast heart rate), prolonged dizziness, persistent high grade fevers. Follow up for prolonged cough, persistent wheezing, persistent throat pain, difficulty swallowing, persistent fevers, leg swelling, or any other concerns. Follow up with your Primary Care Provider.     -Discussed viral etiology. S&S of PNA with follow up. Stable Vitals. Negative COVID test, has been 1 week. Continue to wear a mask while symptomatic.     Differential diagnosis, natural history, supportive care, and indications for immediate follow-up discussed.

## 2022-09-27 NOTE — LETTER
September 27, 2022         Patient: Rhea Baum   YOB: 1985   Date of Visit: 9/27/2022           To Whom it May Concern:    Rhea Baum was seen in my clinic on 9/27/2022. She may return to in office work on 09/30/2022.    If you have any questions or concerns, please don't hesitate to call.        Sincerely,           RUSLAN Benavidez.  Electronically Signed

## 2022-10-14 ENCOUNTER — APPOINTMENT (OUTPATIENT)
Dept: ONCOLOGY | Facility: MEDICAL CENTER | Age: 37
End: 2022-10-14
Attending: REGISTERED NURSE
Payer: COMMERCIAL

## 2022-10-20 ENCOUNTER — OFFICE VISIT (OUTPATIENT)
Dept: URGENT CARE | Facility: PHYSICIAN GROUP | Age: 37
End: 2022-10-20
Payer: COMMERCIAL

## 2022-10-20 ENCOUNTER — HOSPITAL ENCOUNTER (OUTPATIENT)
Dept: RADIOLOGY | Facility: MEDICAL CENTER | Age: 37
End: 2022-10-20
Attending: NURSE PRACTITIONER
Payer: COMMERCIAL

## 2022-10-20 VITALS
DIASTOLIC BLOOD PRESSURE: 54 MMHG | WEIGHT: 118 LBS | SYSTOLIC BLOOD PRESSURE: 92 MMHG | RESPIRATION RATE: 20 BRPM | HEART RATE: 96 BPM | HEIGHT: 64 IN | BODY MASS INDEX: 20.14 KG/M2 | OXYGEN SATURATION: 98 % | TEMPERATURE: 98.5 F

## 2022-10-20 DIAGNOSIS — R07.81 RIB PAIN ON RIGHT SIDE: ICD-10-CM

## 2022-10-20 DIAGNOSIS — B96.89 ACUTE BACTERIAL SINUSITIS: ICD-10-CM

## 2022-10-20 DIAGNOSIS — J01.90 ACUTE BACTERIAL SINUSITIS: ICD-10-CM

## 2022-10-20 DIAGNOSIS — R05.9 COUGH IN ADULT: ICD-10-CM

## 2022-10-20 LAB
EXTERNAL QUALITY CONTROL: NORMAL
FLUAV+FLUBV AG SPEC QL IA: NEGATIVE
INT CON NEG: NEGATIVE
INT CON NEG: NEGATIVE
INT CON POS: POSITIVE
INT CON POS: POSITIVE
SARS-COV+SARS-COV-2 AG RESP QL IA.RAPID: NEGATIVE

## 2022-10-20 PROCEDURE — 99214 OFFICE O/P EST MOD 30 MIN: CPT | Performed by: NURSE PRACTITIONER

## 2022-10-20 PROCEDURE — 71101 X-RAY EXAM UNILAT RIBS/CHEST: CPT | Mod: RT

## 2022-10-20 PROCEDURE — 87426 SARSCOV CORONAVIRUS AG IA: CPT | Performed by: NURSE PRACTITIONER

## 2022-10-20 PROCEDURE — 87804 INFLUENZA ASSAY W/OPTIC: CPT | Performed by: NURSE PRACTITIONER

## 2022-10-20 RX ORDER — AMOXICILLIN AND CLAVULANATE POTASSIUM 875; 125 MG/1; MG/1
1 TABLET, FILM COATED ORAL 2 TIMES DAILY
Qty: 14 TABLET | Refills: 0 | Status: SHIPPED | OUTPATIENT
Start: 2022-10-20 | End: 2022-10-27

## 2022-10-20 RX ORDER — METHYLPREDNISOLONE 4 MG/1
TABLET ORAL
Qty: 21 TABLET | Refills: 0 | Status: SHIPPED | OUTPATIENT
Start: 2022-10-20 | End: 2023-05-04

## 2022-10-20 RX ORDER — PROMETHAZINE HYDROCHLORIDE AND CODEINE PHOSPHATE 6.25; 1 MG/5ML; MG/5ML
5-10 SOLUTION ORAL
Qty: 70 ML | Refills: 0 | Status: SHIPPED | OUTPATIENT
Start: 2022-10-20 | End: 2022-10-27

## 2022-10-20 RX ORDER — CODEINE PHOSPHATE AND GUAIFENESIN 10; 100 MG/5ML; MG/5ML
10 SOLUTION ORAL
Qty: 70 ML | Refills: 0 | Status: SHIPPED | OUTPATIENT
Start: 2022-10-20 | End: 2022-10-27

## 2022-10-20 ASSESSMENT — ENCOUNTER SYMPTOMS
SORE THROAT: 0
DIZZINESS: 0
MYALGIAS: 1
SPUTUM PRODUCTION: 0
SHORTNESS OF BREATH: 0
FEVER: 0
COUGH: 1
HEADACHES: 0
NAUSEA: 0
DIARRHEA: 0
CHILLS: 0
WHEEZING: 0

## 2022-10-20 ASSESSMENT — FIBROSIS 4 INDEX: FIB4 SCORE: 0.64

## 2022-10-20 NOTE — PROGRESS NOTES
Subjective     Rhea Baum is a 36 y.o. female who presents with Cough (Pt sts she has had a cough for 4 weeks and she has sharp pain on upper right side of ribs she sts she feels like she cracked or pulled something tessalon capsules arent helping pt sts she has a pressure/ fluttery feeling in chest at night x 4 weeks ) and Nasal Congestion (X 4 week)            HPI  Recurrent problem.  Patient is a 36-year-old female who presents with continued cough, nasal congestion x4 weeks.  She was seen approximately 3 weeks ago in the clinic and prescribed a course of Tessalon Perles which she states were ineffective.  She continues to have a persistent dry cough and mild nasal congestion.  She denies fever, chills, shortness of breath, wheezing, nausea, or diarrhea.  She also reports new onset of right-sided rib pain after a coughing episode.  She has been taking over-the-counter medications for her symptoms with no relief.    Patient has no known allergies.  Current Outpatient Medications on File Prior to Visit   Medication Sig Dispense Refill    benzonatate (TESSALON) 100 MG Cap Take 1 Capsule by mouth 3 times a day as needed for Cough. 30 Capsule 0    polyethylene glycol/lytes (MIRALAX) 17 g Pack Take 17 g by mouth every day.      Multiple Vitamins-Minerals (MULTIPLE VITAMINS/WOMENS PO) Take  by mouth.       No current facility-administered medications on file prior to visit.     Social History     Socioeconomic History    Marital status:      Spouse name: Not on file    Number of children: Not on file    Years of education: Not on file    Highest education level: Associate degree: occupational, technical, or vocational program   Occupational History    Not on file   Tobacco Use    Smoking status: Former     Packs/day: 1.00     Years: 0.75     Pack years: 0.75     Types: Cigarettes     Quit date:      Years since quittin.8    Smokeless tobacco: Never   Vaping Use    Vaping Use: Never used  "  Substance and Sexual Activity    Alcohol use: Yes     Comment: occ    Drug use: No    Sexual activity: Yes     Partners: Male     Birth control/protection: Pill   Other Topics Concern    Not on file   Social History Narrative    Not on file     Social Determinants of Health     Financial Resource Strain: Not on file   Food Insecurity: Not on file   Transportation Needs: Not on file   Physical Activity: Not on file   Stress: Not on file   Social Connections: Not on file   Intimate Partner Violence: Not on file   Housing Stability: Not on file     Breast Cancer-related family history is not on file.      Review of Systems   Constitutional:  Positive for malaise/fatigue. Negative for chills and fever.   HENT:  Positive for congestion. Negative for sore throat.    Respiratory:  Positive for cough. Negative for sputum production, shortness of breath and wheezing.    Cardiovascular:  Negative for chest pain.        + rib pain   Gastrointestinal:  Negative for diarrhea and nausea.   Musculoskeletal:  Positive for myalgias.   Neurological:  Negative for dizziness and headaches.            Objective     BP (!) 92/54 (BP Location: Right arm, Patient Position: Sitting, BP Cuff Size: Adult)   Pulse 96   Temp 36.9 °C (98.5 °F) (Temporal)   Resp 20   Ht 1.626 m (5' 4\")   Wt 53.5 kg (118 lb)   SpO2 98%   BMI 20.25 kg/m²      Physical Exam  Constitutional:       General: She is not in acute distress.     Appearance: Normal appearance. She is well-developed. She is ill-appearing.   HENT:      Head: Normocephalic.      Right Ear: Tympanic membrane and external ear normal.      Left Ear: Tympanic membrane and external ear normal.      Nose: Mucosal edema, congestion and rhinorrhea present.      Mouth/Throat:      Pharynx: No posterior oropharyngeal erythema.   Eyes:      General:         Right eye: No discharge.         Left eye: No discharge.      Conjunctiva/sclera: Conjunctivae normal.   Cardiovascular:      Rate and " Rhythm: Normal rate and regular rhythm.      Heart sounds: Normal heart sounds.   Pulmonary:      Effort: Pulmonary effort is normal. No respiratory distress.      Breath sounds: Normal breath sounds. No wheezing.   Musculoskeletal:         General: Normal range of motion.      Cervical back: Normal range of motion and neck supple.   Lymphadenopathy:      Cervical: No cervical adenopathy.   Skin:     General: Skin is warm and dry.   Neurological:      Mental Status: She is alert and oriented to person, place, and time.   Psychiatric:         Behavior: Behavior normal.         Thought Content: Thought content normal.                           Assessment & Plan        1. Acute bacterial sinusitis  amoxicillin-clavulanate (AUGMENTIN) 875-125 MG Tab    methylPREDNISolone (MEDROL DOSEPAK) 4 MG Tablet Therapy Pack    POCT Influenza A/B    POCT SARS-COV Antigen KYMBERLY (Symptomatic only)      2. Cough in adult  Promethazine-Codeine 6.25-10 MG/5ML Solution      3. Rib pain on right side  [id number] (WITH 1-VIEW CXR) RIGHT    Promethazine-Codeine 6.25-10 MG/5ML Solution           Negative imaging.  Augmentin and medrol  Cough medication-Patient is cautioned on sedation potential of narcotic medication; no drinking, driving or operating heavy machinery while on this medication.    Flu and covid negative.  Differential diagnosis, natural history, supportive care, and indications for immediate follow-up discussed at length.

## 2022-12-30 ENCOUNTER — OFFICE VISIT (OUTPATIENT)
Dept: URGENT CARE | Facility: PHYSICIAN GROUP | Age: 37
End: 2022-12-30
Payer: COMMERCIAL

## 2022-12-30 VITALS
WEIGHT: 120 LBS | TEMPERATURE: 98.2 F | HEART RATE: 80 BPM | OXYGEN SATURATION: 96 % | RESPIRATION RATE: 14 BRPM | HEIGHT: 64 IN | BODY MASS INDEX: 20.49 KG/M2 | DIASTOLIC BLOOD PRESSURE: 80 MMHG | SYSTOLIC BLOOD PRESSURE: 106 MMHG

## 2022-12-30 DIAGNOSIS — B34.9 VIRAL SYNDROME: ICD-10-CM

## 2022-12-30 PROCEDURE — 99213 OFFICE O/P EST LOW 20 MIN: CPT | Performed by: FAMILY MEDICINE

## 2022-12-30 ASSESSMENT — ENCOUNTER SYMPTOMS
NAUSEA: 1
HEADACHES: 1
VOMITING: 0
WEIGHT LOSS: 0
DIARRHEA: 1
EYE REDNESS: 0
EYE DISCHARGE: 0

## 2022-12-30 ASSESSMENT — FIBROSIS 4 INDEX: FIB4 SCORE: 0.66

## 2022-12-30 NOTE — LETTER
December 30, 2022         Patient: Rhea Baum   YOB: 1985   Date of Visit: 12/30/2022           To Whom it May Concern:    Rhea Baum was seen in my clinic on 12/30/2022. Please excuse work absences this week due to contagious influenza like illness. She may return to her scheduled shift 1/3/2023.   Sincerely,           Florentin Mauricio M.D.  Electronically Signed

## 2022-12-30 NOTE — PROGRESS NOTES
"Subjective     Rhea Baum is a 37 y.o. female who presents with Fever (X3 days ), Headache (X 4 days fatigue possible exposure to RSV  ), and Body Aches (X4 days )            4 days nasal congestion, fatigue, myalgia. Subjective fever/chills. Tmax 100.2. No ST. Hoarse voice. No cough. PMH C19 4/2022. No other aggravating or alleviating factors.        Review of Systems   Constitutional:  Negative for weight loss.   Eyes:  Negative for discharge and redness.   Gastrointestinal:  Positive for diarrhea and nausea. Negative for vomiting.   Musculoskeletal:  Negative for joint pain.   Skin:  Negative for itching and rash.   Neurological:  Positive for headaches.            Objective     /80 (BP Location: Right arm, Patient Position: Sitting, BP Cuff Size: Adult)   Pulse 80   Temp 36.8 °C (98.2 °F) (Temporal)   Resp 14   Ht 1.626 m (5' 4\")   Wt 54.4 kg (120 lb)   SpO2 96%   BMI 20.60 kg/m²      Physical Exam  Constitutional:       General: She is not in acute distress.     Appearance: She is well-developed.   HENT:      Head: Normocephalic and atraumatic.      Right Ear: Tympanic membrane normal.      Left Ear: Tympanic membrane normal.      Nose: Congestion present.      Mouth/Throat:      Mouth: Mucous membranes are moist.      Pharynx: No posterior oropharyngeal erythema.   Eyes:      Conjunctiva/sclera: Conjunctivae normal.   Cardiovascular:      Rate and Rhythm: Normal rate and regular rhythm.      Heart sounds: Normal heart sounds. No murmur heard.  Pulmonary:      Effort: Pulmonary effort is normal.      Breath sounds: Normal breath sounds. No wheezing.   Skin:     General: Skin is warm and dry.      Findings: No rash.   Neurological:      Mental Status: She is alert.                           Assessment & Plan           1. Viral syndrome          Differential diagnosis, natural history, supportive care, and indications for immediate follow-up discussed at length.                "

## 2023-02-23 ENCOUNTER — OFFICE VISIT (OUTPATIENT)
Dept: URGENT CARE | Facility: PHYSICIAN GROUP | Age: 38
End: 2023-02-23
Payer: COMMERCIAL

## 2023-02-23 VITALS
TEMPERATURE: 98.5 F | HEIGHT: 64 IN | BODY MASS INDEX: 20.49 KG/M2 | RESPIRATION RATE: 16 BRPM | SYSTOLIC BLOOD PRESSURE: 100 MMHG | DIASTOLIC BLOOD PRESSURE: 66 MMHG | HEART RATE: 77 BPM | OXYGEN SATURATION: 98 % | WEIGHT: 120 LBS

## 2023-02-23 DIAGNOSIS — J02.9 SORE THROAT: ICD-10-CM

## 2023-02-23 DIAGNOSIS — R09.81 NASAL CONGESTION WITH RHINORRHEA: ICD-10-CM

## 2023-02-23 DIAGNOSIS — R05.1 ACUTE COUGH: ICD-10-CM

## 2023-02-23 DIAGNOSIS — J06.9 VIRAL URI WITH COUGH: ICD-10-CM

## 2023-02-23 DIAGNOSIS — J34.89 NASAL CONGESTION WITH RHINORRHEA: ICD-10-CM

## 2023-02-23 LAB
FLUAV+FLUBV AG SPEC QL IA: NEGATIVE
INT CON NEG: NEGATIVE
INT CON NEG: NORMAL
INT CON POS: NORMAL
INT CON POS: POSITIVE
S PYO AG THROAT QL: NORMAL

## 2023-02-23 PROCEDURE — 99213 OFFICE O/P EST LOW 20 MIN: CPT | Performed by: NURSE PRACTITIONER

## 2023-02-23 PROCEDURE — 87804 INFLUENZA ASSAY W/OPTIC: CPT | Performed by: NURSE PRACTITIONER

## 2023-02-23 PROCEDURE — 87880 STREP A ASSAY W/OPTIC: CPT | Performed by: NURSE PRACTITIONER

## 2023-02-23 ASSESSMENT — ENCOUNTER SYMPTOMS
WHEEZING: 0
COUGH: 1
DIARRHEA: 0
SHORTNESS OF BREATH: 0
HEADACHES: 0
SPUTUM PRODUCTION: 0
NECK PAIN: 0
SINUS PAIN: 0
VOMITING: 0
EYE REDNESS: 0
MYALGIAS: 1
CHILLS: 0
CONSTIPATION: 0
WEAKNESS: 0
SORE THROAT: 1
FEVER: 0
EYE DISCHARGE: 0
CARDIOVASCULAR NEGATIVE: 1
NAUSEA: 0
ABDOMINAL PAIN: 0
DIZZINESS: 0

## 2023-02-23 ASSESSMENT — FIBROSIS 4 INDEX: FIB4 SCORE: 0.66

## 2023-02-23 NOTE — LETTER
February 23, 2023       Patient: Rhea Baum   YOB: 1985   Date of Visit: 2/23/2023         To Whom It May Concern:    In my medical opinion, I recommend that Rhea Baum be excused from work today and tomorrow (2/24/20232) as she was evaluated in clinic today.    If you have any questions or concerns, please don't hesitate to call [phone]          Sincerely,          RUSLAN Blanco.  Electronically Signed

## 2023-02-24 NOTE — PROGRESS NOTES
Subjective     Rhea Baum is a 37 y.o. female who presents with Pharyngitis (Monday night, cough last night, no fever, body aches )            Pharyngitis   Associated symptoms include congestion and coughing. Pertinent negatives include no abdominal pain, diarrhea, ear pain, headaches, neck pain, shortness of breath or vomiting. States sore throat, body aches and mild cough started last night, no fever x 4 days. States nasal congestion, runny nose. DayQuil.  No nasal spray or rinse, no salt water gargle.  No over-the-counter Tylenol or ibuprofen.  Denies nausea or vomiting but does have diarrhea up to 4 times per day, taking no over the counter medications for this. States her daughter and her teacher had similar symptoms. At home COVID test was negative.     PMH:  has a past medical history of Anxiety, GERD (gastroesophageal reflux disease), Multiple sclerosis during pregnancy ([facility]), and PSVT (paroxysmal supraventricular tachycardia) ([facility]).    She has no past medical history of Asthma or Kidney disease.  MEDS:   Current Outpatient Medications:     polyethylene glycol/lytes (MIRALAX) 17 g Pack, Take 17 g by mouth every day., Disp: , Rfl:     Multiple Vitamins-Minerals (MULTIPLE VITAMINS/WOMENS PO), Take  by mouth., Disp: , Rfl:     methylPREDNISolone (MEDROL DOSEPAK) 4 MG Tablet Therapy Pack, Follow schedule on package instructions. (Patient not taking: Reported on 2/23/2023), Disp: 21 Tablet, Rfl: 0    benzonatate (TESSALON) 100 MG Cap, Take 1 Capsule by mouth 3 times a day as needed for Cough. (Patient not taking: Reported on 2/23/2023), Disp: 30 Capsule, Rfl: 0  ALLERGIES: No Known Allergies  SURGHX:   Past Surgical History:   Procedure Laterality Date    OTHER CARDIAC SURGERY      Age 8      SOCHX:  reports that she quit smoking about 2 years ago. Her smoking use included cigarettes. She has a 0.75 pack-year smoking history. She has never used smokeless tobacco. She reports current alcohol use. She  "reports that she does not use drugs.  FH: Family history was reviewed, no pertinent findings to report      Review of Systems   Constitutional:  Positive for malaise/fatigue. Negative for chills and fever.   HENT:  Positive for congestion and sore throat. Negative for ear pain and sinus pain.    Eyes:  Negative for discharge and redness.   Respiratory:  Positive for cough. Negative for sputum production, shortness of breath and wheezing.    Cardiovascular: Negative.    Gastrointestinal:  Negative for abdominal pain, constipation, diarrhea, nausea and vomiting.   Musculoskeletal:  Positive for myalgias. Negative for neck pain.   Skin:  Negative for itching and rash.   Neurological:  Negative for dizziness, weakness and headaches.   Endo/Heme/Allergies:  Negative for environmental allergies.   All other systems reviewed and are negative.           Objective     /66 (BP Location: Left arm, Patient Position: Sitting, BP Cuff Size: Adult)   Pulse 77   Temp 36.9 °C (98.5 °F) (Temporal)   Resp 16   Ht 1.626 m (5' 4\")   Wt 54.4 kg (120 lb)   LMP 02/14/2023   SpO2 98%   BMI 20.60 kg/m²      Physical Exam  Vitals reviewed.   Constitutional:       General: She is awake. She is not in acute distress.     Appearance: Normal appearance. She is well-developed. She is not ill-appearing, toxic-appearing or diaphoretic.   HENT:      Head: Normocephalic.      Right Ear: Tympanic membrane, ear canal and external ear normal.      Left Ear: Tympanic membrane, ear canal and external ear normal.      Nose: Congestion and rhinorrhea present.      Mouth/Throat:      Lips: Pink.      Mouth: Mucous membranes are dry.      Pharynx: Uvula midline. Posterior oropharyngeal erythema present. No pharyngeal swelling, oropharyngeal exudate or uvula swelling.      Tonsils: No tonsillar exudate or tonsillar abscesses. 1+ on the right. 1+ on the left.   Eyes:      Conjunctiva/sclera: Conjunctivae normal.      Pupils: Pupils are equal, " round, and reactive to light.   Cardiovascular:      Rate and Rhythm: Normal rate.   Pulmonary:      Effort: Pulmonary effort is normal.      Breath sounds: Normal breath sounds and air entry.   Musculoskeletal:         General: Normal range of motion.      Cervical back: Normal range of motion and neck supple.   Skin:     General: Skin is warm and dry.   Neurological:      Mental Status: She is alert and oriented to person, place, and time.   Psychiatric:         Attention and Perception: Attention normal.         Mood and Affect: Mood normal.         Speech: Speech normal.         Behavior: Behavior normal. Behavior is cooperative.                           Assessment & Plan        1. Sore throat    - POCT Rapid Strep A  - POCT Influenza A/B    2. Nasal congestion with rhinorrhea    - POCT Influenza A/B    3. Acute cough    - POCT Influenza A/B      4. Viral URI with cough       -Maintain hydration/water intake  -May use over the counter Ibuprofen/Tylenol as needed for fever  -May use humidifier/vaporizer at home as needed for dry cough/nasal passages  -Gargle salt water or throat lozenges as needed for throat irritation/dry cough  -Get rest  -May use daily longer acting antihistamine as needed (no decongestant) for any post nasal drainage   -May use saline nasal spray/Flonase as needed to flush any nasal congestion/post nasal drainage   -Monitor for fevers, worse cough, phlegm, shortness of breath, wheeze, chest tightness- need re-evaluation

## 2023-05-04 ENCOUNTER — TELEMEDICINE (OUTPATIENT)
Dept: TELEHEALTH | Facility: TELEMEDICINE | Age: 38
End: 2023-05-04
Payer: COMMERCIAL

## 2023-05-04 DIAGNOSIS — N30.00 ACUTE CYSTITIS WITHOUT HEMATURIA: ICD-10-CM

## 2023-05-04 PROCEDURE — 99213 OFFICE O/P EST LOW 20 MIN: CPT | Mod: 95 | Performed by: NURSE PRACTITIONER

## 2023-05-04 RX ORDER — NITROFURANTOIN 25; 75 MG/1; MG/1
100 CAPSULE ORAL EVERY 12 HOURS
Qty: 10 CAPSULE | Refills: 0 | Status: SHIPPED | OUTPATIENT
Start: 2023-05-04 | End: 2023-05-09

## 2023-05-04 NOTE — PROGRESS NOTES
Virtual Visit: Established Patient   This visit was conducted via Zoom using secure and encrypted videoconferencing technology.   The patient was in their home in the [facility].    The patient's identity was confirmed and verbal consent was obtained for this virtual visit.    Subjective:   CC: No chief complaint on file.    Rhea Baum is a 37 y.o. female presenting for evaluation and management of: UTI symptoms.  She has a 4 day history of dysuria with urgency and frequency.  She notes mild intermittent low back discomfort.  She denies any fever, chills, nausea or vomiting.  Currently on menses.  Not breastfeeding.  Occasional UTI historically, most recently about 7 years ago.  She has tried Azo but symptoms persist.        ROS   No chest pain or shortness of breath.      Current medicines (including changes today)  Current Outpatient Medications   Medication Sig Dispense Refill    nitrofurantoin (MACROBID) 100 MG Cap Take 1 Capsule by mouth every 12 hours for 5 days. 10 Capsule 0    polyethylene glycol/lytes (MIRALAX) 17 g Pack Take 17 g by mouth every day.      Multiple Vitamins-Minerals (MULTIPLE VITAMINS/WOMENS PO) Take  by mouth.       No current facility-administered medications for this visit.       Patient Active Problem List    Diagnosis Date Noted    Abdominal distention 04/08/2021    Migraine aura, persistent, intractable 02/23/2021    Smoker 01/20/2021    Closed compression fracture of lumbosacral spine ([facility]) 09/23/2020    Multiple sclerosis ([facility]) 09/26/2018    Tobacco abuse 09/26/2018    IUGR (intrauterine growth restriction) affecting care of mother 04/12/2017    PSVT (paroxysmal supraventricular tachycardia) ([facility]) 12/01/2016    Constipation due to neurogenic bowel 12/01/2016        Objective:   There were no vitals taken for this visit.    Physical Exam:  Constitutional: Alert, no distress, well-groomed.  Skin: No rashes in visible areas.  Eye: Round. Conjunctiva clear, lids normal. No  icterus.   ENMT: Lips pink without lesions, moist mucous membranes. Phonation normal.  Neck: No masses, no thyromegaly. Moves freely without pain.  Respiratory: Unlabored respiratory effort, no cough or audible wheeze  Abdomen: Mild TTP at the suprapubic region.  No CVA TTP.  Psych: Alert and oriented x3, normal affect and mood.     Assessment and Plan:   The following treatment plan was discussed:     1. Acute cystitis without hematuria  - nitrofurantoin (MACROBID) 100 MG Cap; Take 1 Capsule by mouth every 12 hours for 5 days.  Dispense: 10 Capsule; Refill: 0      Follow-up: No follow-ups on file.    Discussed exam findings with Rhea.  Differential reviewed.  Take full course of antibiotics.  Maintain adequate po hydration.  OTC azo prn urinary pain.  To ER if pain worsens, or if fever or vomiting develop.  RTC in 3-5 days if symptoms persist, sooner if worse.  She verbalized understanding of and agreed with plan of care.

## 2023-05-04 NOTE — PATIENT INSTRUCTIONS
Urinary Tract Infection, Adult  A urinary tract infection (UTI) is an infection of any part of the urinary tract. The urinary tract includes:  The kidneys.  The ureters.  The bladder.  The urethra.  These organs make, store, and get rid of pee (urine) in the body.  What are the causes?  This is caused by germs (bacteria) in your genital area. These germs grow and cause swelling (inflammation) of your urinary tract.  What increases the risk?  You are more likely to develop this condition if:  You have a small, thin tube (catheter) to drain pee.  You cannot control when you pee or poop (incontinence).  You are female, and:  You use these methods to prevent pregnancy:  A medicine that kills sperm (spermicide).  A device that blocks sperm (diaphragm).  You have low levels of a female hormone (estrogen).  You are pregnant.  You have genes that add to your risk.  You are sexually active.  You take antibiotic medicines.  You have trouble peeing because of:  A prostate that is bigger than normal, if you are male.  A blockage in the part of your body that drains pee from the bladder (urethra).  A kidney stone.  A nerve condition that affects your bladder (neurogenic bladder).  Not getting enough to drink.  Not peeing often enough.  You have other conditions, such as:  Diabetes.  A weak disease-fighting system (immune system).  Sickle cell disease.  Gout.  Injury of the spine.  What are the signs or symptoms?  Symptoms of this condition include:  Needing to pee right away (urgently).  Peeing often.  Peeing small amounts often.  Pain or burning when peeing.  Blood in the pee.  Pee that smells bad or not like normal.  Trouble peeing.  Pee that is cloudy.  Fluid coming from the vagina, if you are female.  Pain in the belly or lower back.  Other symptoms include:  Throwing up (vomiting).  No urge to eat.  Feeling mixed up (confused).  Being tired and grouchy (irritable).  A fever.  Watery poop (diarrhea).  How is this  treated?  This condition may be treated with:  Antibiotic medicine.  Other medicines.  Drinking enough water.  Follow these instructions at home:    Medicines  Take over-the-counter and prescription medicines only as told by your doctor.  If you were prescribed an antibiotic medicine, take it as told by your doctor. Do not stop taking it even if you start to feel better.  General instructions  Make sure you:  Pee until your bladder is empty.  Do not hold pee for a long time.  Empty your bladder after sex.  Wipe from front to back after pooping if you are a female. Use each tissue one time when you wipe.  Drink enough fluid to keep your pee pale yellow.  Keep all follow-up visits as told by your doctor. This is important.  Contact a doctor if:  You do not get better after 1-2 days.  Your symptoms go away and then come back.  Get help right away if:  You have very bad back pain.  You have very bad pain in your lower belly.  You have a fever.  You are sick to your stomach (nauseous).  You are throwing up.  Summary  A urinary tract infection (UTI) is an infection of any part of the urinary tract.  This condition is caused by germs in your genital area.  There are many risk factors for a UTI. These include having a small, thin tube to drain pee and not being able to control when you pee or poop.  Treatment includes antibiotic medicines for germs.  Drink enough fluid to keep your pee pale yellow.  This information is not intended to replace advice given to you by your health care provider. Make sure you discuss any questions you have with your health care provider.  Document Released: 06/05/2009 Document Revised: 12/05/2019 Document Reviewed: 06/27/2019  [employer] Patient Education © 2020 [employer]

## 2023-07-01 ENCOUNTER — APPOINTMENT (OUTPATIENT)
Dept: RADIOLOGY | Facility: MEDICAL CENTER | Age: 38
End: 2023-07-01
Attending: EMERGENCY MEDICINE
Payer: COMMERCIAL

## 2023-07-01 ENCOUNTER — HOSPITAL ENCOUNTER (EMERGENCY)
Facility: MEDICAL CENTER | Age: 38
End: 2023-07-01
Attending: EMERGENCY MEDICINE
Payer: COMMERCIAL

## 2023-07-01 VITALS
HEIGHT: 64 IN | SYSTOLIC BLOOD PRESSURE: 120 MMHG | DIASTOLIC BLOOD PRESSURE: 84 MMHG | WEIGHT: 114.64 LBS | RESPIRATION RATE: 16 BRPM | OXYGEN SATURATION: 99 % | HEART RATE: 81 BPM | BODY MASS INDEX: 19.57 KG/M2 | TEMPERATURE: 98.1 F

## 2023-07-01 DIAGNOSIS — K04.7 DENTAL INFECTION: ICD-10-CM

## 2023-07-01 LAB
ALBUMIN SERPL BCP-MCNC: 4.8 G/DL (ref 3.2–4.9)
ALBUMIN/GLOB SERPL: 2 G/DL
ALP SERPL-CCNC: 68 U/L (ref 30–99)
ALT SERPL-CCNC: 14 U/L (ref 2–50)
ANION GAP SERPL CALC-SCNC: 14 MMOL/L (ref 7–16)
AST SERPL-CCNC: 15 U/L (ref 12–45)
BASOPHILS # BLD AUTO: 0.5 % (ref 0–1.8)
BASOPHILS # BLD: 0.04 K/UL (ref 0–0.12)
BILIRUB SERPL-MCNC: 0.7 MG/DL (ref 0.1–1.5)
BUN SERPL-MCNC: 8 MG/DL (ref 8–22)
CALCIUM ALBUM COR SERPL-MCNC: 8.9 MG/DL (ref 8.5–10.5)
CALCIUM SERPL-MCNC: 9.5 MG/DL (ref 8.5–10.5)
CHLORIDE SERPL-SCNC: 106 MMOL/L (ref 96–112)
CO2 SERPL-SCNC: 22 MMOL/L (ref 20–33)
CREAT SERPL-MCNC: 0.61 MG/DL (ref 0.5–1.4)
EOSINOPHIL # BLD AUTO: 0.11 K/UL (ref 0–0.51)
EOSINOPHIL NFR BLD: 1.3 % (ref 0–6.9)
ERYTHROCYTE [DISTWIDTH] IN BLOOD BY AUTOMATED COUNT: 41.8 FL (ref 35.9–50)
GFR SERPLBLD CREATININE-BSD FMLA CKD-EPI: 118 ML/MIN/1.73 M 2
GLOBULIN SER CALC-MCNC: 2.4 G/DL (ref 1.9–3.5)
GLUCOSE SERPL-MCNC: 84 MG/DL (ref 65–99)
HCT VFR BLD AUTO: 38.1 % (ref 37–47)
HGB BLD-MCNC: 13 G/DL (ref 12–16)
IMM GRANULOCYTES # BLD AUTO: 0.03 K/UL (ref 0–0.11)
IMM GRANULOCYTES NFR BLD AUTO: 0.3 % (ref 0–0.9)
LACTATE SERPL-SCNC: 0.8 MMOL/L (ref 0.5–2)
LYMPHOCYTES # BLD AUTO: 1.88 K/UL (ref 1–4.8)
LYMPHOCYTES NFR BLD: 21.8 % (ref 22–41)
MCH RBC QN AUTO: 31.5 PG (ref 27–33)
MCHC RBC AUTO-ENTMCNC: 34.1 G/DL (ref 32.2–35.5)
MCV RBC AUTO: 92.3 FL (ref 81.4–97.8)
MONOCYTES # BLD AUTO: 0.71 K/UL (ref 0–0.85)
MONOCYTES NFR BLD AUTO: 8.2 % (ref 0–13.4)
NEUTROPHILS # BLD AUTO: 5.85 K/UL (ref 1.82–7.42)
NEUTROPHILS NFR BLD: 67.9 % (ref 44–72)
NRBC # BLD AUTO: 0 K/UL
NRBC BLD-RTO: 0 /100 WBC (ref 0–0.2)
PLATELET # BLD AUTO: 293 K/UL (ref 164–446)
PMV BLD AUTO: 10.8 FL (ref 9–12.9)
POTASSIUM SERPL-SCNC: 4.2 MMOL/L (ref 3.6–5.5)
PROT SERPL-MCNC: 7.2 G/DL (ref 6–8.2)
RBC # BLD AUTO: 4.13 M/UL (ref 4.2–5.4)
SODIUM SERPL-SCNC: 142 MMOL/L (ref 135–145)
WBC # BLD AUTO: 8.6 K/UL (ref 4.8–10.8)

## 2023-07-01 PROCEDURE — 96374 THER/PROPH/DIAG INJ IV PUSH: CPT

## 2023-07-01 PROCEDURE — A9270 NON-COVERED ITEM OR SERVICE: HCPCS | Performed by: EMERGENCY MEDICINE

## 2023-07-01 PROCEDURE — 700102 HCHG RX REV CODE 250 W/ 637 OVERRIDE(OP): Performed by: EMERGENCY MEDICINE

## 2023-07-01 PROCEDURE — 99284 EMERGENCY DEPT VISIT MOD MDM: CPT

## 2023-07-01 PROCEDURE — 700111 HCHG RX REV CODE 636 W/ 250 OVERRIDE (IP): Performed by: EMERGENCY MEDICINE

## 2023-07-01 PROCEDURE — 85025 COMPLETE CBC W/AUTO DIFF WBC: CPT

## 2023-07-01 PROCEDURE — 36415 COLL VENOUS BLD VENIPUNCTURE: CPT

## 2023-07-01 PROCEDURE — 80053 COMPREHEN METABOLIC PANEL: CPT

## 2023-07-01 PROCEDURE — 83605 ASSAY OF LACTIC ACID: CPT

## 2023-07-01 PROCEDURE — 70487 CT MAXILLOFACIAL W/DYE: CPT

## 2023-07-01 PROCEDURE — 700117 HCHG RX CONTRAST REV CODE 255: Performed by: EMERGENCY MEDICINE

## 2023-07-01 PROCEDURE — 70355 PANORAMIC X-RAY OF JAWS: CPT

## 2023-07-01 RX ORDER — AMOXICILLIN AND CLAVULANATE POTASSIUM 875; 125 MG/1; MG/1
1 TABLET, FILM COATED ORAL 2 TIMES DAILY
Qty: 14 TABLET | Refills: 0 | Status: ACTIVE | OUTPATIENT
Start: 2023-07-01 | End: 2023-07-08

## 2023-07-01 RX ORDER — OXYCODONE HYDROCHLORIDE AND ACETAMINOPHEN 5; 325 MG/1; MG/1
1 TABLET ORAL EVERY 4 HOURS PRN
Qty: 15 TABLET | Refills: 0 | Status: SHIPPED | OUTPATIENT
Start: 2023-07-01 | End: 2023-07-04

## 2023-07-01 RX ORDER — OXYCODONE HYDROCHLORIDE AND ACETAMINOPHEN 5; 325 MG/1; MG/1
1 TABLET ORAL ONCE
Status: COMPLETED | OUTPATIENT
Start: 2023-07-01 | End: 2023-07-01

## 2023-07-01 RX ADMIN — OXYCODONE HYDROCHLORIDE AND ACETAMINOPHEN 1 TABLET: 5; 325 TABLET ORAL at 16:22

## 2023-07-01 RX ADMIN — CEFTRIAXONE SODIUM 1000 MG: 10 INJECTION, POWDER, FOR SOLUTION INTRAVENOUS at 20:00

## 2023-07-01 RX ADMIN — IOHEXOL 80 ML: 350 INJECTION, SOLUTION INTRAVENOUS at 18:12

## 2023-07-01 ASSESSMENT — FIBROSIS 4 INDEX: FIB4 SCORE: 0.66

## 2023-07-01 NOTE — ED PROVIDER NOTES
ED Provider Note    Primary care provider: Pcp Pt States None    CHIEF COMPLAINT  Chief Complaint   Patient presents with    Tooth Abscess     Dentist dx pt with R sided upper tooth abscess yesterday and needs further workup/drainage, Rx antibiotics, pt with root canal on Tuesday, swelling, painful          HPI  Rhea Baum is a 37 y.o. female who presents to the Emergency Department right maxillary facial swelling and probable abscess.  The patient had a root canal #5 4 days ago.  She followed up yesterday with swelling that began about 72 hours ago, the dentist told her that there was a small abscess there but at that time he was not overly concerned, he then placed her on amoxicillin.  She noted worsening symptoms and went to another dentist today, the second dentist referred her to the ER for possible oral surgery consultation.  She denies any medical problems, no history of diabetes.      External Record Review: Patient followed up with Dr. Lemus, general dentistry today, was referred here to the ER for periapical abscess.    REVIEW OF SYSTEMS  See HPI.     PAST MEDICAL HISTORY   has a past medical history of Anxiety, GERD (gastroesophageal reflux disease), Multiple sclerosis during pregnancy ([facility]), and PSVT (paroxysmal supraventricular tachycardia) ([facility]).    SURGICAL HISTORY   has a past surgical history that includes other cardiac surgery.    SOCIAL HISTORY  Social History     Tobacco Use    Smoking status: Former     Packs/day: 1.00     Years: 0.75     Pack years: 0.75     Types: Cigarettes     Quit date:      Years since quittin.4    Smokeless tobacco: Never   Vaping Use    Vaping Use: Never used   Substance Use Topics    Alcohol use: Yes     Comment: occ    Drug use: No      Social History     Substance and Sexual Activity   Drug Use No       FAMILY HISTORY  Family History   Problem Relation Age of Onset    Heart Disease Maternal Grandmother     Diabetes Paternal Grandmother         Type 2  "   No Known Problems Mother     No Known Problems Sister     No Known Problems Daughter        CURRENT MEDICATIONS  Reviewed.  See Encounter Summary.     ALLERGIES  No Known Allergies    PHYSICAL EXAM  VITAL SIGNS: /84   Pulse 79   Temp 36.8 °C (98.2 °F) (Temporal)   Resp 16   Ht 1.626 m (5' 4\")   Wt 52 kg (114 lb 10.2 oz)   SpO2 96%   BMI 19.68 kg/m²   Constitutional: Awake, alert in no apparent distress.  HENT: Normocephalic, Bilateral external ears normal. Nose normal.  Prominent right maxillary swelling, tenderness lateral to #5, no trismus, floor the oropharynx is normal.  Eyes: Conjunctiva normal, non-icteric, EOMI.    Thorax & Lungs: Easy unlabored respirations, Clear to ascultation bilaterally.  Cardiovascular: Regular rate,  Abdomen: Nondistended  :    Skin: Visualized skin is  Dry, No erythema, No rash.   Musculoskeletal:   No cyanosis, clubbing or edema. No leg asymmetry.   Neurologic: Alert, Grossly non-focal.   Psychiatric: Normal affect, Normal mood  Lymphatic:            RADIOLOGY  I have independently interpreted the diagnostic imaging associated with this visit and am waiting the final reading from the radiologist.   My preliminary interpretation is as follows: Small hypoechoic region adjacent to the right maxillary teeth concerning for abscess.    Radiologist interpretation:   CT-MAXILLOFACIAL WITH PLUS RECONS   Final Result      1.  Dental caries.      2.  No evidence of subcutaneous abscess.      3.  No evidence of facial bone fractures.      [id number]   Final Result      1.  No acute fracture.      2.  Possible periodontal abscess 2 adjacent teeth in the right maxilla.      3.  Possible minimal periodontal abscess left mandible.          COURSE & MEDICAL DECISION MAKING  Pertinent Labs & Imaging studies reviewed. (See chart for details)    COURSE & MEDICAL DECISION MAKING  Pertinent Labs & Imaging studies reviewed. (See chart for details)    Differential diagnoses " include but are not limited to: Dental abscess, sepsis    3:51 PM - Nursing notes reviewed, patient seen and examined at bedside.    ED Observation Status? Yes; I am placing the patient in to an observation status due to a diagnostic uncertainty as well as therapeutic intensity. Patient placed in observation status at 4:05 PM    Observation plan is as follows: Laboratory evaluation, pain control, CT    Upon Reevaluation, the patient's condition has: Remained stable    Patient discharged from ED Observation status at 7/1/2023 6:56 PM     Discussion of management with other medical personnel: Discussed with Dr. Awad, [facility].  We will obtain CT imaging now, laboratory evaluation, assess need for dental extraction.    Escalation of care considered, and ultimately not performed: acute inpatient care management, however at this time, the patient is most appropriate for outpatient management.    Barriers to care at this time, including but not limited to: Patient does not have established PCP.       Decision Making:  This is a pleasant 37 y.o. year old female who presents with right maxillary swelling.  I do see what appears to be a small area of abscess though radiology did not comment on any abscess in the affected area.  The patient does not have any signs of sepsis.  At this time I think it is reasonable to broaden out the antibiotic coverage with Augmentin, add Percocet for additional pain control and see if she gets improvement over the next 24 to 48 hours.  This has been fewer than 48 hours of antibiotics, therefore I would not call this failure necessarily as yet.  To have OMFS definitively drained as they would have to remove the tooth.  I think it still reasonable to try conservative management rather than extraction giving the reassuring CT and labs.  I did discuss the case with OMFS and the patient can follow-up early next week in clinic.    In prescribing controlled substances to this patient, I certify that I  have obtained and reviewed the medical history of Rhea Baum. I have also made a good nani effort to obtain applicable records from other providers who have treated the patient and records did not demonstrate any increased risk of substance abuse that would prevent me from prescribing controlled substances.     I have conducted a physical exam and documented it. I have reviewed Ms. Baum’s prescription history as maintained by the [place] Prescription Monitoring Program.     I have assessed the patient’s risk for abuse, dependency, and addiction using the validated Opioid Risk Tool available at https://www.mdcalc.com/wnxpmv-ashf-meqh-ort-narcotic-abuse.     Given the above, I believe the benefits of controlled substance therapy outweigh the risks. The reasons for prescribing controlled substances include non-narcotic, oral analgesic alternatives have been inadequate for pain control. Accordingly, I have discussed the risk and benefits, treatment plan, and alternative therapies with the patient.      The patient was discharged home (see d/c instructions) was told to return immediately for any signs or symptoms listed, or any worsening at all.  The patient verbally agreed to the discharge precautions and follow-up plan which is documented in EPIC.    Discharge Medications:  New Prescriptions    AMOXICILLIN-CLAVULANATE (AUGMENTIN) 875-125 MG TAB    Take 1 Tablet by mouth 2 times a day for 7 days.    OXYCODONE-ACETAMINOPHEN (PERCOCET) 5-325 MG TAB    Take 1 Tablet by mouth every four hours as needed for Severe Pain for up to 3 days.       FINAL IMPRESSION  1. Dental infection

## 2023-07-01 NOTE — ED TRIAGE NOTES
"Chief Complaint   Patient presents with    Tooth Abscess     Dentist dx pt with R sided upper tooth abscess yesterday and needs further workup/drainage, Rx antibiotics, pt with root canal on Tuesday, swelling, painful         Ambulated to triage for above complaint. Has taken OTCs today with mild relief.    Pt educated of triage process and informed to contact staff if situation changes.    /77   Pulse 82   Temp 36.8 °C (98.2 °F) (Temporal)   Resp 18   Ht 1.626 m (5' 4\")   Wt 52 kg (114 lb 10.2 oz)   SpO2 97%   BMI 19.68 kg/m²      "

## 2023-07-01 NOTE — ED NOTES
Patient ambulated to [facility]. Patient placed on Spo2 and BP. Chart up for ERP to see.

## 2023-07-02 NOTE — ED NOTES
Patient resting comfortably, resp even and unlabored, NAD, and no needs at this time.     Pending Rocephin, pharmacy messaged to tube.

## 2024-02-09 ENCOUNTER — TELEMEDICINE (OUTPATIENT)
Dept: TELEHEALTH | Facility: TELEMEDICINE | Age: 39
End: 2024-02-09
Payer: COMMERCIAL

## 2024-02-09 ENCOUNTER — TELEPHONE (OUTPATIENT)
Dept: NEUROLOGY | Facility: MEDICAL CENTER | Age: 39
End: 2024-02-09

## 2024-02-09 DIAGNOSIS — G35 MULTIPLE SCLEROSIS (HCC): ICD-10-CM

## 2024-02-09 DIAGNOSIS — G35 MULTIPLE SCLEROSIS EXACERBATION (HCC): ICD-10-CM

## 2024-02-09 PROBLEM — O36.5990 IUGR (INTRAUTERINE GROWTH RESTRICTION) AFFECTING CARE OF MOTHER: Status: RESOLVED | Noted: 2017-04-12 | Resolved: 2024-02-09

## 2024-02-09 PROCEDURE — 99214 OFFICE O/P EST MOD 30 MIN: CPT | Mod: 95 | Performed by: NURSE PRACTITIONER

## 2024-02-09 RX ORDER — 0.9 % SODIUM CHLORIDE 0.9 %
10 VIAL (ML) INJECTION PRN
OUTPATIENT
Start: 2024-02-09

## 2024-02-09 RX ORDER — SODIUM CHLORIDE 9 MG/ML
INJECTION, SOLUTION INTRAVENOUS CONTINUOUS
OUTPATIENT
Start: 2024-02-09

## 2024-02-09 RX ORDER — 0.9 % SODIUM CHLORIDE 0.9 %
VIAL (ML) INJECTION PRN
OUTPATIENT
Start: 2024-02-09

## 2024-02-09 RX ORDER — METHYLPREDNISOLONE 8 MG/1
TABLET ORAL
Qty: 74 TABLET | Refills: 0 | Status: SHIPPED | OUTPATIENT
Start: 2024-02-09 | End: 2024-02-11

## 2024-02-09 RX ORDER — 0.9 % SODIUM CHLORIDE 0.9 %
3 VIAL (ML) INJECTION PRN
OUTPATIENT
Start: 2024-02-09

## 2024-02-09 NOTE — PROGRESS NOTES
This evaluation was conducted via ZOOM using secure and encrypted videoconferencing technology. The patient was in a private location in the state of [place].  The patient's identity was confirmed and verbal consent was obtained for this virtual visit.      Date: 02/09/24     Chief Complaint:    Chief Complaint   Patient presents with    Multiple Sclerosis        History of Present Illness: 38 y.o. female  presents via telehealth requesting a referral to neurology.  Patient has a history of multiple sclerosis relapsing and remitting.  She states that she will have intermittent flares that last 2 to 3 days and then they do go away on her own.  She states currently she has been in a flare for the past 2 weeks.  Her flare presents with right leg heaviness, increased exhaustion, generalized bodyaches and difficulty eating.  Patient states that she has lost weight as she has been only able to eat a liquid diet as if she needs any sort.  She does experience cramping in her abdominal area consistent with MS hug.  She has had no fevers.  She does have poor bladder control.  She states she has had struggles with constipation for the past 10 years no acute symptoms.  She has had some back pain although states back pain is consistent with her MS flares.  She has been on steroids previously for flares but has been approximately 11 years since she has needed them.  She has had infusions previously.  Although due to short flare cycles she has not needed to be evaluated by neurology for quite some time approximately 2 years.          ROS:  As stated in HPI       Medical/SX/ Social History:  Reviewed per chart    Medications:    Current Outpatient Medications on File Prior to Visit   Medication Sig Dispense Refill    polyethylene glycol/lytes (MIRALAX) 17 g Pack Take 17 g by mouth every day.      Multiple Vitamins-Minerals (MULTIPLE VITAMINS/WOMENS PO) Take  by mouth.       No current facility-administered medications on file prior  to visit.        Allergies:    Patient has no known allergies.     Problem list, medications, and allergies reviewed by myself today in Epic     Physical Exam:  Constitutional: Alert, no distress, well-groomed.  Skin: No rashes in visible areas.  Eye: Round. Conjunctiva clear, lids normal. No icterus.   ENMT: Lips pink without lesions, good dentition, moist mucous membranes. Phonation normal.  Neck: No masses, no thyromegaly. Moves freely without pain.  Respiratory: Unlabored respiratory effort, no cough or audible wheeze  Psych: Alert and oriented x3, normal affect and mood.       Medical Decision Making / Plan :  I personally reviewed prior external notes and test results pertinent to today's visit.  Patient appears nontoxic with no acute distress noted although limited due to telehealth visit. Patient reports no symptoms that would indicate acute distress or toxicity.       Pleasant 38 y.o. female  presented via telehealth video technology provided by [facility]  with chronic multiple sclerosis with acute exacerbation.  Did place a urgent referral to neurology.  Discussed steroid taper versus high-dose for 3 days.  Using shared decision making patient will complete a 21-day taper of methylprednisolone.  Did discuss risks of steroids.  Patient did verbalize understanding.  Did discuss ER precautions.     1. Multiple sclerosis (HCC)    - Referral to Neurology    2. Multiple sclerosis exacerbation (HCC)    - methylPREDNISolone (MEDROL) 8 MG tablet; Take 6 Tablets by mouth every day for 7 days, THEN 3 Tablets every day for 7 days, THEN 1.5 Tablets every day for 7 days.  Dispense: 74 Tablet; Refill: 0     Medication discussed included indication for use and the potential benefits and side effects. Education was provided regarding the aforementioned assessments.  All of the patient's questions were answered to their satisfaction.. Patient was encouraged to monitor symptoms closely.  Patient does have a history of PSVT with  IMO load in 2020.  If patient has any signs or symptoms she is to stop the steroids and seek high-level care.  Those signs and symptoms which would warrant concern and mandate seeking a higher level of service through the emergency department discussed at length.  Patient stated agreement and understanding of this plan of care.      Voice Recognition Disclaimer: Portions of this document were created using voice recognition software. The software does have a chance of producing errors of grammar and possibly content. I have made every reasonable attempt to correct obvious errors, but there may be errors of grammar and possibly content that I did not discover before finalizing the documentation.    RUSLAN Hernandez.

## 2024-02-09 NOTE — TELEPHONE ENCOUNTER
Plan for IVMP x5 days through the infusion center (she will contact the [facility] now).    -ORAL

## 2024-02-09 NOTE — TELEPHONE ENCOUNTER
Caller: Rhea Baum     Topic/issue: Patient is actively having MS relapses. Patient cannot even drive and is worried about this. Patient is wondering if she can be seen ASAP.    Callback Number: [phone]     Thank you,  Homa TOBAR

## 2024-03-20 ENCOUNTER — OFFICE VISIT (OUTPATIENT)
Dept: NEUROLOGY | Facility: MEDICAL CENTER | Age: 39
End: 2024-03-20
Attending: PSYCHIATRY & NEUROLOGY
Payer: COMMERCIAL

## 2024-03-20 ENCOUNTER — TELEPHONE (OUTPATIENT)
Dept: NEUROLOGY | Facility: MEDICAL CENTER | Age: 39
End: 2024-03-20

## 2024-03-20 VITALS
BODY MASS INDEX: 18.14 KG/M2 | DIASTOLIC BLOOD PRESSURE: 70 MMHG | HEART RATE: 91 BPM | SYSTOLIC BLOOD PRESSURE: 110 MMHG | WEIGHT: 106.26 LBS | HEIGHT: 64 IN | OXYGEN SATURATION: 97 % | TEMPERATURE: 98.5 F

## 2024-03-20 DIAGNOSIS — K59.00 CONSTIPATION, UNSPECIFIED CONSTIPATION TYPE: ICD-10-CM

## 2024-03-20 DIAGNOSIS — G35 MULTIPLE SCLEROSIS (HCC): Primary | ICD-10-CM

## 2024-03-20 PROCEDURE — 99211 OFF/OP EST MAY X REQ PHY/QHP: CPT | Performed by: PSYCHIATRY & NEUROLOGY

## 2024-03-20 PROCEDURE — 99205 OFFICE O/P NEW HI 60 MIN: CPT | Performed by: PSYCHIATRY & NEUROLOGY

## 2024-03-20 RX ORDER — CLADRIBINE 10 MG/1
10 TABLET ORAL DAILY
Qty: 5 EACH | Refills: 0 | Status: SHIPPED | OUTPATIENT
Start: 2024-03-20 | End: 2024-03-25

## 2024-03-20 ASSESSMENT — FIBROSIS 4 INDEX: FIB4 SCORE: 0.51

## 2024-03-20 ASSESSMENT — PATIENT HEALTH QUESTIONNAIRE - PHQ9: CLINICAL INTERPRETATION OF PHQ2 SCORE: 0

## 2024-03-20 NOTE — TELEPHONE ENCOUNTER
Prior Authorization for Mavenclad (10 Tabs) 10 MG Tbpk has been approved for a quantity of 10, day supply 30    Prior Authorization reference number: [id number]  Insurance: Matthias NOYOLA ([employer])  Effective dates: 03/20/2024 - 03/20/2025  Copay: unsure as we CANNOT fill     Is patient eligible to fill with [facility]? No, test claim rejected stating MUST BE FILLED AT SPECIALTY [facility] CALL [phone] PRODUCT/SERVICE NOT APPROPRIATE FOR THIS  LOCATION.    Next Steps:  Will have liaison release to either [facility] or [facility]

## 2024-03-20 NOTE — PROGRESS NOTES
"[facility] NEUROLOGY  MULTIPLE SCLEROSIS & NEUROIMMUNOLOGY  FOLLOW-UP VISIT    DISEASE SUMMARY:  Principal neurologic diagnosis: MS  Diagnosis of MS: 2012  Disease History:  - 2009: onset of Lhermitte phenomenon and digestive issues, established with GI  - 2012: episode of right ON, referred to ophthalmology, referred to hospital, workup included imaging, treated with IVMP with good but incomplete recovery, diagnosed w/ MS, stared Copaxone  - 2012: episode of right lower extremity numbness and perhaps weakness  - 2014: started Tysabri, took this for ~6 months  - worsened fatigue, balance problems  - 4/2021: started Ocrevus  - 4/12/2022: stopped Ocrevus  - 2/2024: Strep throat, 3-4 weeks of right lower extremity \"heaviness\" and anorexia (it would hurt to eat)  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  - Ocrevus  Current disease therapies:  - none  Symptomatic therapies:  - none  CSF:  - never sampled  Other Testing:  - none  MRI head:  - 10/12/2018: \"numerous ovoid and elliptical areas of demyelination involving the periventricular and juxtacortical white matter, as well as the left cerebral peduncle and brachium pontis bilaterally... active enhancing lesion in the right brachium pontis...\"  MRI cervical spine:  - 10/12/2018: \"multiple areas of bright T2 cord signal scattered throughout the cervical and upper thoracic regions... no abnormal enhancement...\"  MRI thoracic spine:  - 10/12/2018: multiple areas of patchy bright T2 signal scattered throughout the spinal cord without abnormal enhancement...\"  Immunizations:  - influenza?:   - Pneumonia?:  - SARS-CoV-2?:   Cancer Screens:  - mammogram:   - PAP?:   - skin check:     CC: MS    INTERVAL HISTORY:  Rhea Baum is a 38 y.o. woman with MS.  She last saw Dr. Estes in the clinic " "on 11/26/2018.  At that time they planned to pursue treatment with Gilenya.  Today, she was unaccompanied, and she provided the following interval history:    I have summarized pertinent data above.    A review of MS-related symptoms was notable for the following:    Fatigue: yes; she feels exhausted  Weakness: yes; involving the right lower extremity  Numbness: yes; involving the right lower extremity (especially the toes)  Incoordination: yes; difficulty controlling the right hand (drops things), right lower extremity itching  Spasms/Spasticity: yes; involving the right lower extremity  Vision Impairment: yes; she loses focus easily, things become blurry easily  Walking/Balance Problems: yes; exploring using an assistive device, she walks close to walls, she can't stand up in a shower because she feels like she will \"pass out\"  Neuralgia: yes; Lhermitte phenomenon, \"flash headaches\" over the right frontal region  Bowel Symptoms: yes; constipation: 4 doses of Miralax, Dulcolax, 8 fiber supplements daily in order to have a BM, consulted with GI without much benefit  Bladder Symptoms: yes; frequency: no, urgency: yes, hesitancy: yes, incomplete emptying: yes, incontinence: rare (during UTI)  Heat Sensitivity: yes  Depression:  not assessed  Cognitive/Memory Problems: yes; she loses her train of though during conversations  Sexual Dysfunction: yes; no sex drive  Anxiety:  not assessed    MEDICATIONS:  Current Outpatient Medications   Medication Sig    polyethylene glycol/lytes (MIRALAX) 17 g Pack Take 17 g by mouth every day.    Multiple Vitamins-Minerals (MULTIPLE VITAMINS/WOMENS PO) Take  by mouth.     MEDICAL, SOCIAL, AND FAMILY HISTORY:  There is no change in the patient's ROS or medical, social, or family histories since the previous visit on 4/8/2021.    REVIEW OF SYSTEMS:  A ROS was completed.  Pertinent positives and negatives were included in the HPI, above.  All other systems were reviewed and are " negative.    PHYSICAL EXAM:  General/Medical:  - NAD  - hair, skin, nails, and joints were normal  - neck was supple without Lhermitte's phenomenon    Neuro:  MENTAL STATUS: awake and alert; no deficits of speech or language; oriented to person, place, and time; affect was appropriate to situation    CRANIAL NERVES:    II: acuity: J1/J1+, fields: intact to confrontation, pupils: 3/3 to 2/2 without a relative afferent pupillary defect, discs: sharp, no red desaturation noted    III/IV/VI: versions: intact without nystagmus    V: facial sensation: symmetric to light touch    VII: facial expression: symmetric    VIII: hearing: intact to finger rub    IX/X: palate: elevates symmetrically    XI: shoulder shrug: symmetric    XII: tongue: midline    MOTOR:  - bulk: normal throughout  - tone: paratonia in the upper extremties  Upper Extremity Strength  (R/L)    5/5   Elbow flexion 4/5   Elbow extension 4/5   Shoulder abduction 5/5     Lower Extremity Strength  (R/L)   Hip flexion 4/5   Knee extension 4/5   Knee flexion 4/5   Ankle dorsiflexion 4/5   Ankle plantarflexion 4/5     - heel-walk: intact  - toe-walk: intact  - pronator drift: NT  - abnormal movements: none    SENSATION:  - light touch: changes in sensation (patchy areas of anaesthesia and hyperesthesia) over the right lower extremity  - vibration (R/L, seconds): 15/12 at the great toes  - pinprick: NT  - proprioception: NT  - Romberg: positive    COORDINATION:  - finger to nose: NT  - finger tapping: NT    REFLEXES:  Reflex Right Left   BR 2+ 2+   Biceps 2+ 2+   Triceps 2+ 2+   Patellae 2+ 2+   Achilles 2+ 2+   Toes down down     GAIT:  - narrow base and normal  - tandem gait: intact    QUANTITATIVE SCORES:  Timed 25-foot walk (sec): 4.4 on 3/20/2024.  Assistive device: none    REVIEW OF IMAGING STUDIES:  I have summarized available data above.    REVIEW OF LABORATORY STUDIES:  I have summarized available data above.    ASSESSMENT:  Rhea Baum is a 38  "y.o. woman with MS.  We discussed immunotherapy options at length.  She has not tolerated Copaxone, Gilenya, Tysabri, or the Ocrevus in the past.  She prefers a short-term agent which does not induce a long-term, sustained immunosuppression.  We agreed upon treatment with Mavenclad.  I have ordered the recommended pre-treatment labs, and she signed the start form.    PLAN:  MS:  - start Mavenclad  Orders Placed This Encounter    CBC with differential    hepatitis B surface Ab    hepatitis b surface antigen    hepatitis C antibody    HIV Ag/Ab assay screening    LFTs    quantiferon gold plus TB (4 tube)    varicella zoster IgG Ab    Referral to Gastroenterology     Follow-Up:  - Return in about 3 months (around 6/20/2024).    Signed: Olman Kerr M.D.    BILLING DOCUMENTATION:   I spent 67 minutes reviewing the medical record, interviewing and examining the patient, discussing my impression (see \"assessment\" above), and coordinating care.  "

## 2024-03-22 ENCOUNTER — HOSPITAL ENCOUNTER (OUTPATIENT)
Dept: LAB | Facility: MEDICAL CENTER | Age: 39
End: 2024-03-22
Attending: PSYCHIATRY & NEUROLOGY
Payer: COMMERCIAL

## 2024-03-22 DIAGNOSIS — G35 MULTIPLE SCLEROSIS (HCC): ICD-10-CM

## 2024-03-22 LAB
ALBUMIN SERPL BCP-MCNC: 4.9 G/DL (ref 3.2–4.9)
ALP SERPL-CCNC: 61 U/L (ref 30–99)
ALT SERPL-CCNC: 8 U/L (ref 2–50)
AST SERPL-CCNC: 12 U/L (ref 12–45)
BASOPHILS # BLD AUTO: 0.7 % (ref 0–1.8)
BASOPHILS # BLD: 0.05 K/UL (ref 0–0.12)
BILIRUB CONJ SERPL-MCNC: <0.2 MG/DL (ref 0.1–0.5)
BILIRUB INDIRECT SERPL-MCNC: NORMAL MG/DL (ref 0–1)
BILIRUB SERPL-MCNC: 1.3 MG/DL (ref 0.1–1.5)
EOSINOPHIL # BLD AUTO: 0.11 K/UL (ref 0–0.51)
EOSINOPHIL NFR BLD: 1.5 % (ref 0–6.9)
ERYTHROCYTE [DISTWIDTH] IN BLOOD BY AUTOMATED COUNT: 42.1 FL (ref 35.9–50)
HBV SURFACE AB SERPL IA-ACNC: 28.6 MIU/ML (ref 0–10)
HBV SURFACE AG SER QL: NORMAL
HCT VFR BLD AUTO: 41.9 % (ref 37–47)
HCV AB SER QL: NORMAL
HGB BLD-MCNC: 14 G/DL (ref 12–16)
HIV 1+2 AB+HIV1 P24 AG SERPL QL IA: NORMAL
IMM GRANULOCYTES # BLD AUTO: 0.03 K/UL (ref 0–0.11)
IMM GRANULOCYTES NFR BLD AUTO: 0.4 % (ref 0–0.9)
LYMPHOCYTES # BLD AUTO: 2.35 K/UL (ref 1–4.8)
LYMPHOCYTES NFR BLD: 32.2 % (ref 22–41)
MCH RBC QN AUTO: 30.6 PG (ref 27–33)
MCHC RBC AUTO-ENTMCNC: 33.4 G/DL (ref 32.2–35.5)
MCV RBC AUTO: 91.7 FL (ref 81.4–97.8)
MONOCYTES # BLD AUTO: 0.52 K/UL (ref 0–0.85)
MONOCYTES NFR BLD AUTO: 7.1 % (ref 0–13.4)
NEUTROPHILS # BLD AUTO: 4.23 K/UL (ref 1.82–7.42)
NEUTROPHILS NFR BLD: 58.1 % (ref 44–72)
NRBC # BLD AUTO: 0 K/UL
NRBC BLD-RTO: 0 /100 WBC (ref 0–0.2)
PLATELET # BLD AUTO: 303 K/UL (ref 164–446)
PMV BLD AUTO: 11.2 FL (ref 9–12.9)
PROT SERPL-MCNC: 7.6 G/DL (ref 6–8.2)
RBC # BLD AUTO: 4.57 M/UL (ref 4.2–5.4)
WBC # BLD AUTO: 7.3 K/UL (ref 4.8–10.8)

## 2024-03-22 PROCEDURE — 36415 COLL VENOUS BLD VENIPUNCTURE: CPT

## 2024-03-22 PROCEDURE — 86787 VARICELLA-ZOSTER ANTIBODY: CPT

## 2024-03-22 PROCEDURE — 86803 HEPATITIS C AB TEST: CPT

## 2024-03-22 PROCEDURE — 80076 HEPATIC FUNCTION PANEL: CPT

## 2024-03-22 PROCEDURE — 86706 HEP B SURFACE ANTIBODY: CPT

## 2024-03-22 PROCEDURE — 87389 HIV-1 AG W/HIV-1&-2 AB AG IA: CPT

## 2024-03-22 PROCEDURE — 85025 COMPLETE CBC W/AUTO DIFF WBC: CPT

## 2024-03-22 PROCEDURE — 86480 TB TEST CELL IMMUN MEASURE: CPT

## 2024-03-22 PROCEDURE — 87340 HEPATITIS B SURFACE AG IA: CPT

## 2024-03-24 LAB — VZV IGG SER IA-ACNC: 2032 IV

## 2024-03-25 LAB
GAMMA INTERFERON BACKGROUND BLD IA-ACNC: 0.02 IU/ML
M TB IFN-G BLD-IMP: NEGATIVE
M TB IFN-G CD4+ BCKGRND COR BLD-ACNC: 0.01 IU/ML
MITOGEN IGNF BCKGRD COR BLD-ACNC: >10 IU/ML
QFT TB2 - NIL TBQ2: 0.01 IU/ML

## 2024-06-20 ENCOUNTER — HOSPITAL ENCOUNTER (OUTPATIENT)
Dept: LAB | Facility: MEDICAL CENTER | Age: 39
End: 2024-06-20
Attending: PSYCHIATRY & NEUROLOGY
Payer: COMMERCIAL

## 2024-06-20 ENCOUNTER — TELEPHONE (OUTPATIENT)
Dept: NEUROLOGY | Facility: MEDICAL CENTER | Age: 39
End: 2024-06-20

## 2024-06-20 ENCOUNTER — OFFICE VISIT (OUTPATIENT)
Dept: NEUROLOGY | Facility: MEDICAL CENTER | Age: 39
End: 2024-06-20
Attending: PSYCHIATRY & NEUROLOGY
Payer: COMMERCIAL

## 2024-06-20 VITALS
DIASTOLIC BLOOD PRESSURE: 68 MMHG | SYSTOLIC BLOOD PRESSURE: 90 MMHG | OXYGEN SATURATION: 96 % | HEIGHT: 64 IN | WEIGHT: 113.1 LBS | HEART RATE: 78 BPM | TEMPERATURE: 98.1 F | RESPIRATION RATE: 16 BRPM | BODY MASS INDEX: 19.31 KG/M2

## 2024-06-20 DIAGNOSIS — G35 MULTIPLE SCLEROSIS (HCC): ICD-10-CM

## 2024-06-20 DIAGNOSIS — G43.E09 CHRONIC MIGRAINE WITH AURA WITHOUT STATUS MIGRAINOSUS, NOT INTRACTABLE: ICD-10-CM

## 2024-06-20 DIAGNOSIS — G35 MULTIPLE SCLEROSIS (HCC): Primary | ICD-10-CM

## 2024-06-20 LAB
BASOPHILS # BLD AUTO: 0.9 % (ref 0–1.8)
BASOPHILS # BLD: 0.05 K/UL (ref 0–0.12)
EOSINOPHIL # BLD AUTO: 0.09 K/UL (ref 0–0.51)
EOSINOPHIL NFR BLD: 1.6 % (ref 0–6.9)
ERYTHROCYTE [DISTWIDTH] IN BLOOD BY AUTOMATED COUNT: 47.2 FL (ref 35.9–50)
HCT VFR BLD AUTO: 41.9 % (ref 37–47)
HGB BLD-MCNC: 13.5 G/DL (ref 12–16)
IMM GRANULOCYTES # BLD AUTO: 0.03 K/UL (ref 0–0.11)
IMM GRANULOCYTES NFR BLD AUTO: 0.5 % (ref 0–0.9)
LYMPHOCYTES # BLD AUTO: 1.25 K/UL (ref 1–4.8)
LYMPHOCYTES NFR BLD: 21.8 % (ref 22–41)
MCH RBC QN AUTO: 31 PG (ref 27–33)
MCHC RBC AUTO-ENTMCNC: 32.2 G/DL (ref 32.2–35.5)
MCV RBC AUTO: 96.1 FL (ref 81.4–97.8)
MONOCYTES # BLD AUTO: 0.52 K/UL (ref 0–0.85)
MONOCYTES NFR BLD AUTO: 9.1 % (ref 0–13.4)
NEUTROPHILS # BLD AUTO: 3.79 K/UL (ref 1.82–7.42)
NEUTROPHILS NFR BLD: 66.1 % (ref 44–72)
NRBC # BLD AUTO: 0 K/UL
NRBC BLD-RTO: 0 /100 WBC (ref 0–0.2)
PLATELET # BLD AUTO: 294 K/UL (ref 164–446)
PMV BLD AUTO: 11.9 FL (ref 9–12.9)
RBC # BLD AUTO: 4.36 M/UL (ref 4.2–5.4)
WBC # BLD AUTO: 5.7 K/UL (ref 4.8–10.8)

## 2024-06-20 PROCEDURE — 99211 OFF/OP EST MAY X REQ PHY/QHP: CPT | Performed by: PSYCHIATRY & NEUROLOGY

## 2024-06-20 PROCEDURE — RXMED WILLOW AMBULATORY MEDICATION CHARGE: Performed by: PSYCHIATRY & NEUROLOGY

## 2024-06-20 PROCEDURE — 36415 COLL VENOUS BLD VENIPUNCTURE: CPT

## 2024-06-20 PROCEDURE — 3078F DIAST BP <80 MM HG: CPT | Performed by: PSYCHIATRY & NEUROLOGY

## 2024-06-20 PROCEDURE — 85025 COMPLETE CBC W/AUTO DIFF WBC: CPT

## 2024-06-20 PROCEDURE — 3074F SYST BP LT 130 MM HG: CPT | Performed by: PSYCHIATRY & NEUROLOGY

## 2024-06-20 PROCEDURE — 99214 OFFICE O/P EST MOD 30 MIN: CPT | Performed by: PSYCHIATRY & NEUROLOGY

## 2024-06-20 RX ORDER — AMANTADINE HYDROCHLORIDE 100 MG/1
100 CAPSULE, GELATIN COATED ORAL 2 TIMES DAILY
Qty: 60 CAPSULE | Refills: 5 | Status: SHIPPED | OUTPATIENT
Start: 2024-06-20 | End: 2024-12-17

## 2024-06-20 ASSESSMENT — FIBROSIS 4 INDEX: FIB4 SCORE: 0.52

## 2024-06-20 NOTE — PROGRESS NOTES
"[facility] NEUROLOGY  MULTIPLE SCLEROSIS & NEUROIMMUNOLOGY  FOLLOW-UP VISIT    DISEASE SUMMARY:  Principal neurologic diagnosis: MS  Diagnosis of MS: 2012  Disease History:  - 2009: onset of Lhermitte phenomenon and digestive issues, established with GI  - 2012: episode of right ON, referred to ophthalmology, referred to hospital, workup included imaging, treated with IVMP with good but incomplete recovery, diagnosed w/ MS, stared Copaxone  - 2012: episode of right lower extremity numbness and perhaps weakness  - 2014: started Tysabri, took this for ~6 months  - worsened fatigue, balance problems  - 4/2021: started Ocrevus  - 4/12/2022: stopped Ocrevus  - 2/2024: Strep throat, 3-4 weeks of right lower extremity \"heaviness\" and anorexia (it would hurt to eat)  - 4/19/2024: Mavenclad course 1 cycle 1  - 5/17/2024: Mavenclad course 1 cycle 2  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  - Ocrevus  Current disease therapies:  - Mavenclad (s/p 1 course)  Symptomatic therapies:  - none  CSF:  - never sampled  Other Testing:  - none  MRI head:  - 10/12/2018: \"numerous ovoid and elliptical areas of demyelination involving the periventricular and juxtacortical white matter, as well as the left cerebral peduncle and brachium pontis bilaterally... active enhancing lesion in the right brachium pontis...\"  MRI cervical spine:  - 10/12/2018: \"multiple areas of bright T2 cord signal scattered throughout the cervical and upper thoracic regions... no abnormal enhancement...\"  MRI thoracic spine:  - 10/12/2018: multiple areas of patchy bright T2 signal scattered throughout the spinal cord without abnormal enhancement...\"  Immunizations:  - influenza?:   - Pneumonia?:  - SARS-CoV-2?:   Cancer Screens:  - mammogram:   - PAP?:   - skin check:     CC: " "MS    INTERVAL HISTORY:  Rhea Baum is a 38 y.o. woman with MS.  I last saw her in the MS Clinic on 3/20/2024.  At that time we planned to start Mavenclad.  Today, she was unaccompanied, and she provided the following interval history:    MS:  I have summarized pertinent data above.    A review of MS-related symptoms was notable for the following:    Fatigue: yes; she feels exhausted , can't do >2 things/day or else she can't do anything the following day, sleep 6 hours/night  Neuralgia: yes; Lhermitte phenomenon, \"flash headaches\" over the right frontal region , left V2 distribution (5-6/10 intensity, throbbing, daily, all day)    Headaches:  The following is a summary of headache symptoms, presented in my standard format:    Family History: sister (identical twin)  Age at onset (years): 20s  Location: occiput, forehead  Radiation: posterior to anterior  Frequency: baseline: daily, lately:   Duration: baseline: hours, lately:   Headache Days/Month: baseline: 28/30, lately:   Quality: \"pressure, heaviness\"  Intensity: baseline: 5-6/10, lately:   Aura: sounds sensitivity  Photophobia/Phonophobia/Nausea/Vomiting: yes/yes/yes/not recently  Provoked by Physical Activity?:   Triggers: ?Mavenclad, food (solid foods, eats a liquid diet)  Associated Symptoms:   Autonomic Signs (such as ptosis, miosis, conjunctival injection, rhinorrhea, increased lacrimation):   Head Trauma:   Association with Menses: none  ED Visits: none  Hospitalizations: none  Missed Work Days (): none  Sleep (hours/night): 6  Caffeine Intake: 1/2 mug of coffee/morning, 1/3 cup of coffee at work  Hydration: well-hydrated  Nutrition: eats regularly, sometimes forgets to eat at work  Exercise:   Analgesic Overuse: probably    Current Medication Regimen:  - aspirin  - acetaminophen    Medications Tried: Response  Preventive:  -     Rescue:  -     Medications Not Tried:  - propranolol: would like to avoid this given low BP (90/68)  - amitriptyline: " would like to avoid this given baseline fatigue  - topiramate: history of bulimia    MEDICATIONS:  Current Outpatient Medications   Medication Sig    amantadine (SYMMETREL) 100 MG Cap Take 1 Capsule by mouth 2 times a day for 180 days.    polyethylene glycol/lytes (MIRALAX) 17 g Pack Take 17 g by mouth every day.    Multiple Vitamins-Minerals (MULTIPLE VITAMINS/WOMENS PO) Take  by mouth.     MEDICAL, SOCIAL, AND FAMILY HISTORY:  There is no change in the patient's ROS or medical, social, or family histories since the previous visit on 3/20/2024.    REVIEW OF SYSTEMS:  A ROS was completed.  Pertinent positives and negatives were included in the HPI, above.  All other systems were reviewed and are negative.    PHYSICAL EXAM:  General/Medical:  - NAD    Neuro:  MENTAL STATUS: awake and alert; no deficits of speech or language; oriented to conversation; affect was appropriate to situation; pleasant, cooperative    CRANIAL NERVES:    II: acuity: NT, fields: NT, pupils: NT, discs: NT    III/IV/VI: versions: grossly intact    V: facial sensation: NT    VII: facial expression: symmetric    VIII: hearing: intact to voice    IX/X: palate: NT    XI: shoulder shrug: NT    XII: tongue: NT    MOTOR:  - bulk: NT  - tone: NT  Upper Extremity Strength (R/L)    NT   Elbow flexion NT   Elbow extension NT   Shoulder abduction NT     Lower Extremity Strength  (R/L)   Hip flexion NT   Knee extension NT   Knee flexion NT   Ankle dorsiflexion NT   Ankle plantarflexion NT     - pronator drift: NT  - abnormal movements: none    SENSATION:  - light touch: NT  - vibration (R/L, seconds): NT at the great toes  - pinprick: NT  - proprioception: NT  - Romberg: NT    COORDINATION:  - finger to nose: NT  - finger tapping: NT    REFLEXES:  Reflex Right Left   BR NT NT   Biceps NT NT   Triceps NT NT   Patellae NT NT   Achilles NT NT   Toes NT NT     GAIT:  - NT    QUANTITATIVE SCORES:  Timed 25-foot walk (sec): NT today (4.4 on 3/20/2024).   Assistive device: none    REVIEW OF IMAGING STUDIES:  No additional data since the last visit.    REVIEW OF LABORATORY STUDIES:  No additional data since the last visit.    ASSESSMENT:  Rhea Baum is a 38 y.o. woman with MS.  She tolerated the first course of Mavenclad well.  Plan for monitoring blood work today.  She struggles with fatigue, so I offered a trial of amantadine.  Her chronic migraines are worse.  She has contraindicates to several first-line agents, so we will try for Botox.    PLAN:  MS:  - start Mavenclad  Orders Placed This Encounter    CBC WITH DIFFERENTIAL    amantadine (SYMMETREL) 100 MG Cap     Chronic Migraine w/ Aura:  Prevention:  - start Botox: plan to inject 155 units according to the dosing/injection paradigm currently mandated by the FDA for chronic migraine as follows:  - 10 units of BOTOX divided into 2 sites into the   - 5 units into the Procerus  - 20 units of Botox divided into 4 units into the Frontalis  - 40 units of Botox divided into 8 sites (4 sites to the right Temporalis and 4 sites to the left Temporalis)  - 30 units divided into 6 units (3 units to the right Occipitalis and 3 units to left Occipitalis)  - 20 units divided into 4 units (2 units to the right Cervical paraspinals, 2 units to the left Cervical paraspinals)  - 30 units of Botox divided into 6 units (3 units to right trapezius, 3 units to the left trapezius).    - see above for list of first-line agents and reasons we can't use them    - get 7-9 hours of sleep per night; can try supplementing melatonin 2-10 mg, 2-3 hours before bedtime  - drink plenty of fluids (urine should be nearly clear)  - avoid excessive caffeine intake (no more than 2 servings per day and nothing in the afternoon)  - eat regular meals (don't skip meals)  - get moderate exercise (even just a 20 minute walk daily)    Rescue:  - do not use analgesics (e.g., ibuprofen, acetaminophen) more than 2 days per week in order to  avoid analgesic rebound headaches    - keep a headache log    Follow-Up:  - Return in about 5 months (around 11/20/2024).    Signed: Olman Kerr M.D.

## 2024-06-21 ENCOUNTER — PHARMACY VISIT (OUTPATIENT)
Dept: PHARMACY | Facility: MEDICAL CENTER | Age: 39
End: 2024-06-21
Payer: COMMERCIAL

## 2024-07-03 ENCOUNTER — OFFICE VISIT (OUTPATIENT)
Dept: NEUROLOGY | Facility: MEDICAL CENTER | Age: 39
End: 2024-07-03
Attending: PSYCHIATRY & NEUROLOGY
Payer: COMMERCIAL

## 2024-07-03 VITALS
OXYGEN SATURATION: 99 % | RESPIRATION RATE: 16 BRPM | BODY MASS INDEX: 18.97 KG/M2 | HEIGHT: 64 IN | TEMPERATURE: 97.6 F | WEIGHT: 111.11 LBS | HEART RATE: 84 BPM | SYSTOLIC BLOOD PRESSURE: 98 MMHG | DIASTOLIC BLOOD PRESSURE: 58 MMHG

## 2024-07-03 DIAGNOSIS — G35 MULTIPLE SCLEROSIS (HCC): ICD-10-CM

## 2024-07-03 DIAGNOSIS — G43.E09 CHRONIC MIGRAINE WITH AURA WITHOUT STATUS MIGRAINOSUS, NOT INTRACTABLE: Primary | ICD-10-CM

## 2024-07-03 PROCEDURE — 3074F SYST BP LT 130 MM HG: CPT | Performed by: PSYCHIATRY & NEUROLOGY

## 2024-07-03 PROCEDURE — 64615 CHEMODENERV MUSC MIGRAINE: CPT | Performed by: PSYCHIATRY & NEUROLOGY

## 2024-07-03 PROCEDURE — 64615 CHEMODENERV MUSC MIGRAINE: CPT

## 2024-07-03 PROCEDURE — 3078F DIAST BP <80 MM HG: CPT | Performed by: PSYCHIATRY & NEUROLOGY

## 2024-07-03 PROCEDURE — 700101 HCHG RX REV CODE 250

## 2024-07-03 PROCEDURE — 700111 HCHG RX REV CODE 636 W/ 250 OVERRIDE (IP): Mod: JZ

## 2024-07-03 RX ADMIN — SODIUM CHLORIDE 155 UNITS: 9 INJECTION INTRAMUSCULAR; INTRAVENOUS; SUBCUTANEOUS at 10:24

## 2024-07-03 ASSESSMENT — FIBROSIS 4 INDEX: FIB4 SCORE: 0.53

## 2024-07-15 ENCOUNTER — TELEPHONE (OUTPATIENT)
Dept: NEUROLOGY | Facility: MEDICAL CENTER | Age: 39
End: 2024-07-15
Payer: COMMERCIAL

## 2024-07-15 DIAGNOSIS — G35 MULTIPLE SCLEROSIS (HCC): ICD-10-CM

## 2024-10-03 ENCOUNTER — APPOINTMENT (OUTPATIENT)
Dept: NEUROLOGY | Facility: MEDICAL CENTER | Age: 39
End: 2024-10-03
Attending: PSYCHIATRY & NEUROLOGY
Payer: COMMERCIAL

## 2024-11-21 ENCOUNTER — TELEPHONE (OUTPATIENT)
Dept: NEUROLOGY | Facility: MEDICAL CENTER | Age: 39
End: 2024-11-21

## 2024-11-21 ENCOUNTER — OFFICE VISIT (OUTPATIENT)
Dept: NEUROLOGY | Facility: MEDICAL CENTER | Age: 39
End: 2024-11-21
Attending: PSYCHIATRY & NEUROLOGY
Payer: COMMERCIAL

## 2024-11-21 VITALS
HEIGHT: 64 IN | DIASTOLIC BLOOD PRESSURE: 60 MMHG | BODY MASS INDEX: 20.17 KG/M2 | OXYGEN SATURATION: 98 % | HEART RATE: 82 BPM | SYSTOLIC BLOOD PRESSURE: 98 MMHG | TEMPERATURE: 98.2 F | RESPIRATION RATE: 17 BRPM | WEIGHT: 118.17 LBS

## 2024-11-21 DIAGNOSIS — G43.E09 CHRONIC MIGRAINE WITH AURA WITHOUT STATUS MIGRAINOSUS, NOT INTRACTABLE: Primary | ICD-10-CM

## 2024-11-21 PROCEDURE — 99214 OFFICE O/P EST MOD 30 MIN: CPT | Performed by: PSYCHIATRY & NEUROLOGY

## 2024-11-21 PROCEDURE — 99211 OFF/OP EST MAY X REQ PHY/QHP: CPT | Performed by: PSYCHIATRY & NEUROLOGY

## 2024-11-21 PROCEDURE — 3074F SYST BP LT 130 MM HG: CPT | Performed by: PSYCHIATRY & NEUROLOGY

## 2024-11-21 PROCEDURE — 3078F DIAST BP <80 MM HG: CPT | Performed by: PSYCHIATRY & NEUROLOGY

## 2024-11-21 RX ORDER — ATOGEPANT 60 MG/1
60 TABLET ORAL DAILY
Qty: 30 TABLET | Refills: 11 | Status: SHIPPED | OUTPATIENT
Start: 2024-11-21 | End: 2025-11-21

## 2024-11-21 RX ORDER — ONDANSETRON 4 MG/1
4 TABLET, FILM COATED ORAL EVERY 4 HOURS PRN
Qty: 20 TABLET | Refills: 11 | Status: SHIPPED | OUTPATIENT
Start: 2024-11-21 | End: 2025-11-21

## 2024-11-21 ASSESSMENT — PATIENT HEALTH QUESTIONNAIRE - PHQ9: CLINICAL INTERPRETATION OF PHQ2 SCORE: 0

## 2024-11-21 ASSESSMENT — FIBROSIS 4 INDEX: FIB4 SCORE: 0.55

## 2024-11-21 NOTE — TELEPHONE ENCOUNTER
Received New Start PA request via [employer]  for Qulipta. (Quantity:30, Day Supply:30)     Insurance: [facility]  Member ID:  [id number]  BIN: [id number]  PCN: [id number]  Group: [id number]     Ran Test claim via [place] & medication Rejects stating prior authorization is required.

## 2024-11-21 NOTE — TELEPHONE ENCOUNTER
Received New Start PA request via MSOT  for Ubrelvy. (Quantity:10, Day Supply:30)     Insurance: [facility]  Member ID:  [id number]  BIN: [id number]  PCN: [id number]  Group: [id number]     Ran Test claim via [place] & medication Rejects stating prior authorization is required.

## 2024-11-22 NOTE — TELEPHONE ENCOUNTER
Prior Authorization for Qulipta (Quantity: 30, Days: 30) has been submitted via [facility] KEY:[id number]    Insurance: Matthias    Will follow up in 24-48 business hours.

## 2024-11-22 NOTE — TELEPHONE ENCOUNTER
Prior Authorization for Ubrelvy (Quantity: 10, Days: 30) has been submitted via Cover My Meds: Key ([id number])    Insurance: Matthias    Will follow up in 24-48 business hours.

## 2024-12-19 PROCEDURE — RXMED WILLOW AMBULATORY MEDICATION CHARGE: Performed by: PSYCHIATRY & NEUROLOGY

## 2024-12-20 PROCEDURE — RXMED WILLOW AMBULATORY MEDICATION CHARGE: Performed by: PSYCHIATRY & NEUROLOGY

## 2024-12-23 ENCOUNTER — PHARMACY VISIT (OUTPATIENT)
Dept: PHARMACY | Facility: MEDICAL CENTER | Age: 39
End: 2024-12-23
Payer: COMMERCIAL

## 2025-01-22 PROCEDURE — RXMED WILLOW AMBULATORY MEDICATION CHARGE: Performed by: PSYCHIATRY & NEUROLOGY

## 2025-01-27 ENCOUNTER — PHARMACY VISIT (OUTPATIENT)
Dept: PHARMACY | Facility: MEDICAL CENTER | Age: 40
End: 2025-01-27
Payer: COMMERCIAL

## 2025-02-19 ENCOUNTER — TELEMEDICINE (OUTPATIENT)
Dept: NEUROLOGY | Facility: MEDICAL CENTER | Age: 40
End: 2025-02-19
Attending: PSYCHIATRY & NEUROLOGY
Payer: COMMERCIAL

## 2025-02-19 ENCOUNTER — OFFICE VISIT (OUTPATIENT)
Dept: URGENT CARE | Facility: PHYSICIAN GROUP | Age: 40
End: 2025-02-19
Payer: COMMERCIAL

## 2025-02-19 VITALS
HEART RATE: 88 BPM | OXYGEN SATURATION: 98 % | SYSTOLIC BLOOD PRESSURE: 120 MMHG | HEIGHT: 64 IN | BODY MASS INDEX: 19.46 KG/M2 | RESPIRATION RATE: 14 BRPM | DIASTOLIC BLOOD PRESSURE: 68 MMHG | WEIGHT: 114 LBS | TEMPERATURE: 98.5 F

## 2025-02-19 DIAGNOSIS — G35 MULTIPLE SCLEROSIS (HCC): Primary | ICD-10-CM

## 2025-02-19 DIAGNOSIS — J06.9 UPPER RESPIRATORY TRACT INFECTION, UNSPECIFIED TYPE: ICD-10-CM

## 2025-02-19 DIAGNOSIS — F40.240 CLAUSTROPHOBIA: ICD-10-CM

## 2025-02-19 PROCEDURE — 3074F SYST BP LT 130 MM HG: CPT

## 2025-02-19 PROCEDURE — 3078F DIAST BP <80 MM HG: CPT

## 2025-02-19 PROCEDURE — 99213 OFFICE O/P EST LOW 20 MIN: CPT

## 2025-02-19 RX ORDER — DEXTROMETHORPHAN HYDROBROMIDE AND PROMETHAZINE HYDROCHLORIDE 15; 6.25 MG/5ML; MG/5ML
5 SYRUP ORAL EVERY 6 HOURS PRN
Qty: 118 ML | Refills: 0 | Status: SHIPPED | OUTPATIENT
Start: 2025-02-19 | End: 2025-02-26

## 2025-02-19 RX ORDER — BENZONATATE 100 MG/1
100 CAPSULE ORAL 3 TIMES DAILY PRN
Qty: 30 CAPSULE | Refills: 0 | Status: SHIPPED | OUTPATIENT
Start: 2025-02-19 | End: 2025-03-01

## 2025-02-19 RX ORDER — DIAZEPAM 5 MG/1
TABLET ORAL
Qty: 2 TABLET | Refills: 0 | Status: SHIPPED | OUTPATIENT
Start: 2025-02-19 | End: 2025-02-20

## 2025-02-19 ASSESSMENT — FIBROSIS 4 INDEX: FIB4 SCORE: 0.55

## 2025-02-19 NOTE — PROGRESS NOTES
"[facility] NEUROLOGY  MULTIPLE SCLEROSIS & NEUROIMMUNOLOGY  FOLLOW-UP VISIT    This evaluation was conducted via Teams using secure and encrypted videoconferencing technology. The patient was in their home in the state [facility].    The patient's identity was confirmed and verbal consent was obtained for this virtual visit.    DISEASE SUMMARY:  Principal neurologic diagnosis: MS  Diagnosis of MS: 2012  Disease History:  - 2009: onset of Lhermitte phenomenon and digestive issues, established with GI  - 2012: episode of right ON, referred to ophthalmology, referred to hospital, workup included imaging, treated with IVMP with good but incomplete recovery, diagnosed w/ MS, stared Copaxone  - 2012: episode of right lower extremity numbness and perhaps weakness  - 2014: started Tysabri, took this for ~6 months  - worsened fatigue, balance problems  - 4/2021: started Ocrevus  - 4/12/2022: stopped Ocrevus  - 2/2024: Strep throat, 3-4 weeks of right lower extremity \"heaviness\" and anorexia (it would hurt to eat)  - 4/19/2024: Mavenclad course 1 cycle 1  - 5/17/2024: Mavenclad course 1 cycle 2  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  - Ocrevus  Current disease therapies:  - Mavenclad (s/p 1 course)  Symptomatic therapies:  - none  CSF:  - never sampled  Other Testing:  - none  MRI head:  - 10/12/2018: \"numerous ovoid and elliptical areas of demyelination involving the periventricular and juxtacortical white matter, as well as the left cerebral peduncle and brachium pontis bilaterally... active enhancing lesion in the right brachium pontis...\"  MRI cervical spine:  - 10/12/2018: \"multiple areas of bright T2 cord signal scattered throughout the cervical and upper thoracic regions... no abnormal enhancement...\"  MRI thoracic spine:  - 10/12/2018: " "multiple areas of patchy bright T2 signal scattered throughout the spinal cord without abnormal enhancement...\"  Immunizations:  - influenza?:   - Pneumonia?:  - SARS-CoV-2?:   Cancer Screens:  - mammogram:   - PAP?:   - skin check:     CC: MS, chronic migraine w/ aura    INTERVAL HISTORY:  Rhea Baum is a 39 y.o. woman with MS and chronic migraine w/ aura.  I last saw her in the clinic on 11/21/2024.  At that time we planned to eventually proceed with the second course of Mavenclad, and I recommended a trial of Qulipta and Ubrelvy.  Today, I followed up with her via Teams, and she provided the following interval history:    MS:  I have summarized pertinent data above.    Ms. Baum has not noticed any new neurologic symptoms since starting Mavenclad.    A review of MS-related symptoms was notable for the following:  Fatigue: yes; she feels exhausted , can't do >2 things/day or else she can't do anything the following day, sleep 6 hours/night    Chronic Migraine w/ Aura:  The following is a summary of headache symptoms, presented in my standard format:    Family History: sister (identical twin)  Age at onset (years): 20s  Location: occiput, forehead  Radiation: posterior to anterior  Frequency: baseline: daily, lately: ~8-12/month  Duration: baseline: hours, lately: hours  Headache Days/Month: baseline: 28/30, lately: 8-12/30  Quality: \"pressure, heaviness\"  Intensity: baseline: 5-6/10, lately:   Aura: sounds sensitivity  Photophobia/Phonophobia/Nausea/Vomiting: yes/yes/yes/not recently  Provoked by Physical Activity?:   Triggers: ?Mavenclad, food (solid foods, eats a liquid diet)  Associated Symptoms:   Autonomic Signs (such as ptosis, miosis, conjunctival injection, rhinorrhea, increased lacrimation):   Head Trauma:   Association with Menses: none  ED Visits: none  Hospitalizations: none  Missed Work Days (): none  Sleep (hours/night): 6  Caffeine Intake: 1/2 mug of coffee/morning, 1/3 cup of coffee at " work  Hydration: well-hydrated  Nutrition: eats regularly, sometimes forgets to eat at work  Exercise:   Analgesic Overuse: probably    Current Medication Regimen:  - Qulipta: helpful  - Ubrelvy: helpful, associated with some sleepiness, some minor stomach cramping the following day  - aspirin  - acetaminophen  - ibuprofen    Medications Tried: Response  Preventive:  - Botox: received 1 round, probably effective, stopped due to out-of-pocket expense    Rescue:  -     Medications Not Tried:  - propranolol: would like to avoid this given low BP (90/68)  - amitriptyline: would like to avoid this given baseline fatigue  - topiramate: history of bulimia    MEDICATIONS:  Current Outpatient Medications   Medication Sig    promethazine-dextromethorphan (PROMETHAZINE-DM) 6.25-15 MG/5ML syrup Take 5 mL by mouth every 6 hours as needed for Cough for up to 7 days. (caution: may cause sedation)    benzonatate (TESSALON) 100 MG Cap Take 1 Capsule by mouth 3 times a day as needed for Cough for up to 10 days.    diazePAM (VALIUM) 5 MG Tab One day supply. Take 1 tablet 60 minutes prior to MRI. May repeat x 1 if needed. Do not drive to/from MRI.    Atogepant (QULIPTA) 60 MG Tab Take 60 mg by mouth every day.    Ubrogepant 100 MG Tab Take 1 tablet by mouth at the onset of aura/HA; may repeat 1 time after 2 hrs if HA persists. Max of 200 mg daily.    ondansetron (ZOFRAN) 4 MG Tab tablet Take 1 Tablet by mouth every four hours as needed for Nausea/Vomiting.    polyethylene glycol/lytes (MIRALAX) 17 g Pack Take 17 g by mouth every day.    Multiple Vitamins-Minerals (MULTIPLE VITAMINS/WOMENS PO) Take  by mouth.     MEDICAL, SOCIAL, AND FAMILY HISTORY:  There is no change in the patient's ROS or medical, social, or family histories since the previous visit on 11/21/2024.    REVIEW OF SYSTEMS:  A ROS was completed.  Pertinent positives and negatives were included in the HPI, above.  All other systems were reviewed and are  negative.    PHYSICAL EXAM:  General/Medical:  - NAD    Neuro:  MENTAL STATUS: awake and alert; no deficits of speech or language; oriented to conversation; affect was appropriate to situation; pleasant, cooperative    CRANIAL NERVES:    II: acuity: NT, fields: NT, pupils: NT, discs: NT    III/IV/VI: versions: grossly intact    V: facial sensation: NT    VII: facial expression: symmetric    VIII: hearing: intact to voice    IX/X: palate: NT    XI: shoulder shrug: NT    XII: tongue: NT    MOTOR:  - bulk: NT  - tone: NT  Upper Extremity Strength (R/L)    NT   Elbow flexion NT   Elbow extension NT   Shoulder abduction NT     Lower Extremity Strength  (R/L)   Hip flexion NT   Knee extension NT   Knee flexion NT   Ankle dorsiflexion NT   Ankle plantarflexion NT     - pronator drift: NT  - abnormal movements: none    SENSATION:  - light touch: NT  - vibration (R/L, seconds): NT at the great toes  - pinprick: NT  - proprioception: NT  - Romberg: NT    COORDINATION:  - finger to nose: NT  - finger tapping: NT    REFLEXES:  Reflex Right Left   BR NT NT   Biceps NT NT   Triceps NT NT   Patellae NT NT   Achilles NT NT   Toes NT NT     GAIT:  - NT    QUANTITATIVE SCORES:  Timed 25-foot walk (sec): NT today (4.4 on 3/20/2024).  Assistive device: none    REVIEW OF IMAGING STUDIES:  No additional data since the last visit.    REVIEW OF LABORATORY STUDIES:  No additional data since the last visit.    ASSESSMENT:  Rhea Baum is a 39 y.o. woman with MS and chronic migraine w/ aura.  Plan to proceed with the second course of Mavenclad.  I have ordered the required pre-treatment labs.  She will stop by the neurology clinic to sign the start form during normal business hours.  Otherwise, plan to continue Qulipta and Ubrelvy.  Plans/recommendations as follows:    PLAN:  MS:  - plan to proceed with Mavenclad course 2 next year  - pre-treatment labs  - repeat MRI brain after completing second course of treatment    Chronic  Migraine w/ Aura:  - continue Qulipta daily  - continue Ubrelvy PRN    Orders Placed This Encounter    MR-BRAIN-WITH & W/O    CBC with differential    hepatitis B surface Ab    hepatitis b surface antigen    hepatitis C antibody    HIV Ag/Ab assay screening    LFTs    quantiferon gold plus TB (4 tube)    varicella zoster IgG Ab    diazePAM (VALIUM) 5 MG Tab     - keep a headache log    Follow-Up:  - Return in about 4 months (around 6/19/2025).    Signed: Olman Kerr M.D.

## 2025-02-19 NOTE — LETTER
February 19, 2025    To Whom It May Concern:         This is confirmation that Rhea Abraham Bautista attended her scheduled appointment with FADI Vilchis on 2/19/25.         If you have any questions please do not hesitate to call me at the phone number listed below.    Sincerely,          RUSLAN Vilchis.  [phone]

## 2025-02-19 NOTE — PROGRESS NOTES
"Chief Complaint   Patient presents with    Sore Throat     Cough, x4d          Subjective:   HISTORY OF PRESENT ILLNESS: Rhea Baum is a 39 y.o. female who presents for 4 days of cough, body aches and fevers.  Exposed to flu like illness at work. Feels like she is not improving.    Patient denies SOB or hx of asthma.  No fevers for 24 hours.  She did have nausea and vomiting the night before last but has no further nausea at this time    Medications, Allergies, current problem list, Social and Family history reviewed today in Epic.     Objective:     /68 (BP Location: Right arm, Patient Position: Sitting, BP Cuff Size: Adult)   Pulse 88   Temp 36.9 °C (98.5 °F) (Temporal)   Resp 14   Ht 1.626 m (5' 4\")   Wt 51.7 kg (114 lb)   SpO2 98%     Physical Exam  Vitals reviewed.   Constitutional:       Appearance: Normal appearance. She is not toxic-appearing.   HENT:      Head:      Jaw: No trismus.      Right Ear: Tympanic membrane normal.      Left Ear: Tympanic membrane normal.      Nose: Rhinorrhea present.      Mouth/Throat:      Mouth: Mucous membranes are moist.      Pharynx: Uvula midline. Posterior oropharyngeal erythema present. No pharyngeal swelling, oropharyngeal exudate or uvula swelling.      Tonsils: No tonsillar exudate or tonsillar abscesses. 1+ on the right. 1+ on the left.      Comments: No muffled voice, trismus, unilateral deviation of the uvula, soft palate fullness or edema. No oral airway compromise, or drooling noted.   Eyes:      Conjunctiva/sclera: Conjunctivae normal.   Cardiovascular:      Rate and Rhythm: Normal rate and regular rhythm.      Heart sounds: Normal heart sounds.   Pulmonary:      Effort: Pulmonary effort is normal. No respiratory distress.      Breath sounds: Normal breath sounds. No decreased breath sounds or wheezing.   Musculoskeletal:      Cervical back: Full passive range of motion without pain and neck supple.   Skin:     General: Skin is warm and dry. "   Neurological:      Mental Status: She is alert and oriented to person, place, and time.   Psychiatric:         Mood and Affect: Mood normal.          Assessment/Plan:     Diagnosis and associated orders    I personally reviewed prior external notes and test results pertinent to today's visit.     1. Upper respiratory tract infection, unspecified type  promethazine-dextromethorphan (PROMETHAZINE-DM) 6.25-15 MG/5ML syrup    benzonatate (TESSALON) 100 MG Cap            IMPRESSION: The patient is well appearing here with reassuring exam and vitals signs. Symptomatology is consistent with a viral illness and are self limiting.  Informed that no antibiotics are indicated at this time.  Recommended supportive therapies and preferred OTC medications for symptomatic relief   They are discharged home with a course of cough medications, she has zofran at home from previous illness..   Advised to push fluids.  Discussed anticipated duration of illness, return to work/school, and indications to RTC or go to the Emergency department.    Patient states understanding of the plan of care and discharge instructions.  They are discharged in stable condition.         Please note that this dictation was created using voice recognition software. I have made a reasonable attempt to correct obvious errors, but I expect that there are errors of grammar and possibly content that I did not discover before finalizing the note.    This note was electronically signed by FADI Vilchis

## 2025-02-20 RX ORDER — DIAZEPAM 5 MG/1
TABLET ORAL
Qty: 2 TABLET | Refills: 0 | Status: SHIPPED | OUTPATIENT
Start: 2025-02-20 | End: 2025-05-20

## 2025-02-25 PROCEDURE — RXMED WILLOW AMBULATORY MEDICATION CHARGE: Performed by: PSYCHIATRY & NEUROLOGY

## 2025-02-26 ENCOUNTER — PHARMACY VISIT (OUTPATIENT)
Dept: PHARMACY | Facility: MEDICAL CENTER | Age: 40
End: 2025-02-26
Payer: COMMERCIAL

## 2025-03-01 ENCOUNTER — APPOINTMENT (OUTPATIENT)
Dept: URGENT CARE | Facility: PHYSICIAN GROUP | Age: 40
End: 2025-03-01
Payer: COMMERCIAL

## 2025-03-01 ENCOUNTER — APPOINTMENT (OUTPATIENT)
Dept: RADIOLOGY | Facility: IMAGING CENTER | Age: 40
End: 2025-03-01
Attending: STUDENT IN AN ORGANIZED HEALTH CARE EDUCATION/TRAINING PROGRAM
Payer: COMMERCIAL

## 2025-03-01 VITALS
HEART RATE: 119 BPM | SYSTOLIC BLOOD PRESSURE: 100 MMHG | WEIGHT: 115 LBS | HEIGHT: 64 IN | OXYGEN SATURATION: 100 % | TEMPERATURE: 98.6 F | BODY MASS INDEX: 19.63 KG/M2 | RESPIRATION RATE: 20 BRPM | DIASTOLIC BLOOD PRESSURE: 62 MMHG

## 2025-03-01 DIAGNOSIS — G35 MULTIPLE SCLEROSIS (HCC): ICD-10-CM

## 2025-03-01 DIAGNOSIS — J40 BRONCHITIS: ICD-10-CM

## 2025-03-01 DIAGNOSIS — J02.9 VIRAL PHARYNGITIS: ICD-10-CM

## 2025-03-01 PROCEDURE — 3078F DIAST BP <80 MM HG: CPT | Performed by: STUDENT IN AN ORGANIZED HEALTH CARE EDUCATION/TRAINING PROGRAM

## 2025-03-01 PROCEDURE — 71046 X-RAY EXAM CHEST 2 VIEWS: CPT | Mod: TC | Performed by: RADIOLOGY

## 2025-03-01 PROCEDURE — 99214 OFFICE O/P EST MOD 30 MIN: CPT | Mod: 25 | Performed by: STUDENT IN AN ORGANIZED HEALTH CARE EDUCATION/TRAINING PROGRAM

## 2025-03-01 PROCEDURE — 3074F SYST BP LT 130 MM HG: CPT | Performed by: STUDENT IN AN ORGANIZED HEALTH CARE EDUCATION/TRAINING PROGRAM

## 2025-03-01 RX ORDER — KETOROLAC TROMETHAMINE 15 MG/ML
15 INJECTION, SOLUTION INTRAMUSCULAR; INTRAVENOUS ONCE
Status: COMPLETED | OUTPATIENT
Start: 2025-03-01 | End: 2025-03-01

## 2025-03-01 RX ORDER — IBUPROFEN 800 MG/1
800 TABLET, FILM COATED ORAL EVERY 8 HOURS PRN
Qty: 30 TABLET | Refills: 0 | Status: SHIPPED | OUTPATIENT
Start: 2025-03-01

## 2025-03-01 RX ADMIN — KETOROLAC TROMETHAMINE 15 MG: 15 INJECTION, SOLUTION INTRAMUSCULAR; INTRAVENOUS at 10:01

## 2025-03-01 ASSESSMENT — FIBROSIS 4 INDEX: FIB4 SCORE: 0.55

## 2025-03-01 NOTE — PROGRESS NOTES
Subjective:   CHIEF COMPLAINT  Chief Complaint   Patient presents with    Pharyngitis     FV from last  visit, not getting better, sore throat, loss of voice       HPI  Rhea Baum is a 39 y.o. female who presents with a chief complaint of a cough and sore throat x 2 weeks ago.  Reports multiple sick contacts at work at onset of symptoms.  Seen in urgent care, diagnosed with RSV and treated symptomatically with promethazine cough syrup and Tessalon Perles without any relief of symptoms.  States a couple days ago she seemed to begin feeling better however symptoms returned and now developed a hoarse voice, worsening sore throat and a persistent cough.  States she is wheezing and feeling short of breath.  Only has chest pain with coughing.  Cough is mostly dry.  Has felt nausea without any vomiting.  Reports she has had strep throat in the past and current symptoms do not feel similar.  No recent fevers or bodyaches.  No history of asthma or use of inhalers.  Does not smoke.  Says she is not pregnant.    REVIEW OF SYSTEMS  General: no fever or chills  GI: Admits nausea without vomiting takes  See HPI for further details.    PAST MEDICAL HISTORY  Patient Active Problem List    Diagnosis Date Noted    Chronic migraine with aura without status migrainosus, not intractable 06/20/2024    Abdominal distention 04/08/2021    Migraine aura, persistent, intractable 02/23/2021    Smoker 01/20/2021    Closed compression fracture of lumbosacral spine ([facility]) 09/23/2020    Multiple sclerosis ([facility]) 09/26/2018    Tobacco abuse 09/26/2018    PSVT (paroxysmal supraventricular tachycardia) ([facility]) 12/01/2016    Constipation due to neurogenic bowel 12/01/2016       SURGICAL HISTORY   has a past surgical history that includes other cardiac surgery.    ALLERGIES  No Known Allergies    CURRENT MEDICATIONS  Home Medications       Reviewed by Heron French D.O. (Physician) on 03/01/25 at 0922  Med List Status: <None>     Medication  "Last Dose Status   Atogepant (QULIPTA) 60 MG Tab Taking Active   benzonatate (TESSALON) 100 MG Cap Taking Active   diazePAM (VALIUM) 5 MG Tab Taking Active   Multiple Vitamins-Minerals (MULTIPLE VITAMINS/WOMENS PO) Taking Active   ondansetron (ZOFRAN) 4 MG Tab tablet Taking Active   polyethylene glycol/lytes (MIRALAX) 17 g Pack Taking Active   Ubrogepant 100 MG Tab Taking Active                    SOCIAL HISTORY  Social History     Tobacco Use    Smoking status: Former     Current packs/day: 0.00     Average packs/day: 1 pack/day for 0.8 years (0.8 ttl pk-yrs)     Types: Cigarettes     Start date: 2020     Quit date:      Years since quittin.1    Smokeless tobacco: Never   Vaping Use    Vaping status: Never Used   Substance and Sexual Activity    Alcohol use: Yes     Comment: occ    Drug use: No    Sexual activity: Yes     Partners: Male     Birth control/protection: Pill       FAMILY HISTORY  Family History   Problem Relation Age of Onset    Heart Disease Maternal Grandmother     Diabetes Paternal Grandmother         Type 2    No Known Problems Mother     No Known Problems Sister     No Known Problems Daughter           Objective:   PHYSICAL EXAM  VITAL SIGNS: /62 (BP Location: Right arm, Patient Position: Sitting, BP Cuff Size: Adult)   Pulse (!) 119   Temp 37 °C (98.6 °F) (Temporal)   Resp 20   Ht 1.626 m (5' 4\")   Wt 52.2 kg (115 lb)   SpO2 100%   BMI 19.74 kg/m²     Gen: no acute distress, soft voice  Skin: dry, intact, moist mucosal membranes  Eyes: No conjunctival injection b/l  Neck: Normal range of motion. No meningeal signs.   ENT: No oropharyngeal erythema or exudates. Uvula midline.  No lymphadenopathy.  Lungs: No increased work of breathing.  CTAB w/ symmetric expansion  CV: Sinus tachycardia w/o murmurs or clicks  Psych: normal affect, normal judgement, alert, awake      RADIOLOGY RESULTS   DX-CHEST-2 VIEWS  Result Date: 3/1/2025  3/1/2025 9:24 AM HISTORY/REASON FOR EXAM:  Cough " TECHNIQUE/EXAM DESCRIPTION: Two views of the chest. COMPARISON:  10/20/2022. FINDINGS: No suspicious lung abnormality. No pleural effusion or pneumothorax. No suspicious bone abnormality evident.     1. No active cardiac or pulmonary disease evident.            Assessment/Plan:     1. Bronchitis  DX-CHEST-2 VIEWS      2. Viral pharyngitis  ketorolac (Toradol) 15 MG/ML injection 15 mg    ibuprofen (MOTRIN) 800 MG Tab      3. Multiple sclerosis (HCC)        1-2) Patient here with persistent viral-like symptoms including a sore throat and dry cough for approximately 2 weeks.  Reports multiple exposures to RSV at work.  Chest x-ray is unremarkable for any acute cardiopulmonary process.  Offered to perform a strep test but patient reports her symptoms do not feel like previous strep infections.  She is currently afebrile without any respiratory distress.  Sinus tachycardia secondary to illness without any additional systemic symptoms.  She was well-appearing, nontoxic without any respiratory distress.  No focal nidus for bacterial infection or indication for antibiotics at this point.  Tolerating p.o. intake without any signs of dehydration.  -Toradol 15 mg IM x 1 for symptomatic relief.  -Ordered ibuprofen 800 mg tid as needed for symptomatic relief  -Fluid hydration relative rest.    3) manages with Mavenclad, so would like to avoid further suppression of the immune system with p.o. Decadron, which was discussed as an alternative treatment however recommended Toradol as described above.        Please note that this dictation was created using voice recognition software. I have made a reasonable attempt to correct obvious errors, but I expect that there are errors of grammar and possibly content that I did not discover before finalizing the note.

## 2025-03-10 ENCOUNTER — PATIENT MESSAGE (OUTPATIENT)
Dept: NEUROLOGY | Facility: MEDICAL CENTER | Age: 40
End: 2025-03-10
Payer: COMMERCIAL

## 2025-04-02 ENCOUNTER — TELEPHONE (OUTPATIENT)
Dept: NEUROLOGY | Facility: MEDICAL CENTER | Age: 40
End: 2025-04-02
Payer: COMMERCIAL

## 2025-04-02 PROCEDURE — RXMED WILLOW AMBULATORY MEDICATION CHARGE: Performed by: PSYCHIATRY & NEUROLOGY

## 2025-04-02 NOTE — TELEPHONE ENCOUNTER
[facility] calling in regards to Mavenclad  Patients Mavenclad needs an auth. Cover my meds key [id number].   Thank you.  Sally Portillo, Med Ass't

## 2025-04-03 ENCOUNTER — HOSPITAL ENCOUNTER (OUTPATIENT)
Dept: LAB | Facility: MEDICAL CENTER | Age: 40
End: 2025-04-03
Attending: PSYCHIATRY & NEUROLOGY
Payer: COMMERCIAL

## 2025-04-03 ENCOUNTER — PHARMACY VISIT (OUTPATIENT)
Dept: PHARMACY | Facility: MEDICAL CENTER | Age: 40
End: 2025-04-03
Payer: COMMERCIAL

## 2025-04-03 DIAGNOSIS — G35 MULTIPLE SCLEROSIS (HCC): ICD-10-CM

## 2025-04-03 LAB
ALBUMIN SERPL BCP-MCNC: 4.6 G/DL (ref 3.2–4.9)
ALP SERPL-CCNC: 63 U/L (ref 30–99)
ALT SERPL-CCNC: 14 U/L (ref 2–50)
AST SERPL-CCNC: 19 U/L (ref 12–45)
BASOPHILS # BLD AUTO: 0.9 % (ref 0–1.8)
BASOPHILS # BLD: 0.07 K/UL (ref 0–0.12)
BILIRUB CONJ SERPL-MCNC: 0.3 MG/DL (ref 0.1–0.5)
BILIRUB INDIRECT SERPL-MCNC: 0.6 MG/DL (ref 0–1)
BILIRUB SERPL-MCNC: 0.9 MG/DL (ref 0.1–1.5)
EOSINOPHIL # BLD AUTO: 0.21 K/UL (ref 0–0.51)
EOSINOPHIL NFR BLD: 2.8 % (ref 0–6.9)
ERYTHROCYTE [DISTWIDTH] IN BLOOD BY AUTOMATED COUNT: 43.2 FL (ref 35.9–50)
HBV SURFACE AB SERPL IA-ACNC: 22.7 MIU/ML (ref 0–10)
HBV SURFACE AG SER QL: NORMAL
HCT VFR BLD AUTO: 41.2 % (ref 37–47)
HCV AB SER QL: NORMAL
HGB BLD-MCNC: 13.7 G/DL (ref 12–16)
HIV 1+2 AB+HIV1 P24 AG SERPL QL IA: NORMAL
IMM GRANULOCYTES # BLD AUTO: 0.04 K/UL (ref 0–0.11)
IMM GRANULOCYTES NFR BLD AUTO: 0.5 % (ref 0–0.9)
LYMPHOCYTES # BLD AUTO: 1.66 K/UL (ref 1–4.8)
LYMPHOCYTES NFR BLD: 22 % (ref 22–41)
MCH RBC QN AUTO: 30.9 PG (ref 27–33)
MCHC RBC AUTO-ENTMCNC: 33.3 G/DL (ref 32.2–35.5)
MCV RBC AUTO: 92.8 FL (ref 81.4–97.8)
MONOCYTES # BLD AUTO: 0.63 K/UL (ref 0–0.85)
MONOCYTES NFR BLD AUTO: 8.3 % (ref 0–13.4)
NEUTROPHILS # BLD AUTO: 4.94 K/UL (ref 1.82–7.42)
NEUTROPHILS NFR BLD: 65.5 % (ref 44–72)
NRBC # BLD AUTO: 0 K/UL
NRBC BLD-RTO: 0 /100 WBC (ref 0–0.2)
PLATELET # BLD AUTO: 302 K/UL (ref 164–446)
PMV BLD AUTO: 11.4 FL (ref 9–12.9)
PROT SERPL-MCNC: 7.3 G/DL (ref 6–8.2)
RBC # BLD AUTO: 4.44 M/UL (ref 4.2–5.4)
WBC # BLD AUTO: 7.6 K/UL (ref 4.8–10.8)

## 2025-04-03 PROCEDURE — 36415 COLL VENOUS BLD VENIPUNCTURE: CPT

## 2025-04-03 PROCEDURE — 86803 HEPATITIS C AB TEST: CPT

## 2025-04-03 PROCEDURE — 86706 HEP B SURFACE ANTIBODY: CPT

## 2025-04-03 PROCEDURE — 85025 COMPLETE CBC W/AUTO DIFF WBC: CPT

## 2025-04-03 PROCEDURE — 87389 HIV-1 AG W/HIV-1&-2 AB AG IA: CPT

## 2025-04-03 PROCEDURE — 86480 TB TEST CELL IMMUN MEASURE: CPT

## 2025-04-03 PROCEDURE — 87340 HEPATITIS B SURFACE AG IA: CPT

## 2025-04-03 PROCEDURE — 86787 VARICELLA-ZOSTER ANTIBODY: CPT

## 2025-04-03 PROCEDURE — 80076 HEPATIC FUNCTION PANEL: CPT

## 2025-04-04 LAB — VZV IGG SER IA-ACNC: 11.2 S/CO

## 2025-04-05 LAB
GAMMA INTERFERON BACKGROUND BLD IA-ACNC: 0.03 IU/ML
M TB IFN-G BLD-IMP: NEGATIVE
M TB IFN-G CD4+ BCKGRND COR BLD-ACNC: 0 IU/ML
MITOGEN IGNF BCKGRD COR BLD-ACNC: 8.05 IU/ML
QFT TB2 - NIL TBQ2: 0 IU/ML

## 2025-04-08 NOTE — TELEPHONE ENCOUNTER
Prior Authorization for Mavenclad (Quantity: 10, Days: 30) has been submitted via Cover My Meds: Key ([id number])    Insurance: [employer]    Will follow up in 24-48 business hours.

## 2025-04-14 NOTE — TELEPHONE ENCOUNTER
Prior Authorization for Mavenclad has been approved for a quantity of 10 , day supply 30    Prior Authorization reference number: [id number]  Insurance: [employer]  Effective dates: 04/08/25-06/08/25  Copay: $N/A     Is patient eligible to fill with [facility]? No, test claim rejected stating Pharmacy not in network.

## 2025-05-05 ENCOUNTER — PATIENT MESSAGE (OUTPATIENT)
Dept: NEUROLOGY | Facility: MEDICAL CENTER | Age: 40
End: 2025-05-05
Payer: COMMERCIAL

## 2025-06-05 NOTE — PROGRESS NOTES
"[facility] NEUROLOGY  MULTIPLE SCLEROSIS & NEUROIMMUNOLOGY  FOLLOW-UP VISIT    DISEASE SUMMARY:  Principal neurologic diagnosis: MS  Diagnosis of MS: 2012  Disease History:  - 2009: onset of Lhermitte phenomenon and digestive issues, established with GI  - 2012: episode of right ON, referred to ophthalmology, referred to hospital, workup included imaging, treated with IVMP with good but incomplete recovery, diagnosed w/ MS, stared Copaxone  - 2012: episode of right lower extremity numbness and perhaps weakness  - 2014: started Tysabri, took this for ~6 months  - worsened fatigue, balance problems  - 4/2021: started Ocrevus  - 4/12/2022: stopped Ocrevus  - 2/2024: Strep throat, 3-4 weeks of right lower extremity \"heaviness\" and anorexia (it would hurt to eat)  - 4/19/2024: Mavenclad course 1 cycle 1  - 5/17/2024: Mavenclad course 1 cycle 2  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  - Ocrevus  Current disease therapies:  - Mavenclad (s/p 1 course)  Symptomatic therapies:  - none  CSF:  - never sampled  Other Testing:  - none  MRI head:  - 10/12/2018: \"numerous ovoid and elliptical areas of demyelination involving the periventricular and juxtacortical white matter, as well as the left cerebral peduncle and brachium pontis bilaterally... active enhancing lesion in the right brachium pontis...\"  MRI cervical spine:  - 10/12/2018: \"multiple areas of bright T2 cord signal scattered throughout the cervical and upper thoracic regions... no abnormal enhancement...\"  MRI thoracic spine:  - 10/12/2018: multiple areas of patchy bright T2 signal scattered throughout the spinal cord without abnormal enhancement...\"  Immunizations:  - influenza?:   - Pneumonia?:  - SARS-CoV-2?:   Cancer Screens:  - mammogram:   - PAP?:   - skin check:     CC: MS, " I reviewed the H&P, I examined the patient, and there are no changes in the patient's condition.     "chronic migraine w/ aura    INTERVAL HISTORY:  Rhea Baum is a 39 y.o. woman with MS and chronic migraine w/ aura.  I last saw her in the clinic on 7/3/2024.  At that time I administered Botox for treatment of chronic migraine.  Today, she was unaccompanied, and she provided the following interval history:    MS:  I have summarized pertinent data above.    Ms. Baum has not noticed any new neurologic symptoms since starting Mavenclad.    A review of MS-related symptoms was notable for the following:  Fatigue: yes; she feels exhausted , can't do >2 things/day or else she can't do anything the following day, sleep 6 hours/night    Chronic Migraine w/ Aura:  The following is a summary of headache symptoms, presented in my standard format:    Family History: sister (identical twin)  Age at onset (years): 20s  Location: occiput, forehead  Radiation: posterior to anterior  Frequency: baseline: daily, lately: ~2 times/week  Duration: baseline: hours, lately: hours  Headache Days/Month: baseline: 28/30, lately: 12-15/30  Quality: \"pressure, heaviness\"  Intensity: baseline: 5-6/10, lately:   Aura: sounds sensitivity  Photophobia/Phonophobia/Nausea/Vomiting: yes/yes/yes/not recently  Provoked by Physical Activity?:   Triggers: ?Mavenclad, food (solid foods, eats a liquid diet)  Associated Symptoms:   Autonomic Signs (such as ptosis, miosis, conjunctival injection, rhinorrhea, increased lacrimation):   Head Trauma:   Association with Menses: none  ED Visits: none  Hospitalizations: none  Missed Work Days (): none  Sleep (hours/night): 6  Caffeine Intake: 1/2 mug of coffee/morning, 1/3 cup of coffee at work  Hydration: well-hydrated  Nutrition: eats regularly, sometimes forgets to eat at work  Exercise:   Analgesic Overuse: probably    Current Medication Regimen:  - aspirin  - acetaminophen  - ibuprofen    Medications Tried: Response  Preventive:  - Botox: received 1 round, probably effective, stopped due to " out-of-pocket expense    Rescue:  -     Medications Not Tried:  - propranolol: would like to avoid this given low BP (90/68)  - amitriptyline: would like to avoid this given baseline fatigue  - topiramate: history of bulimia    MEDICATIONS:  Current Outpatient Medications   Medication Sig    Atogepant (QULIPTA) 60 MG Tab Take 60 mg by mouth every day.    Ubrogepant 100 MG Tab Take 100 mg at the onset of aura/HA; may re-dose x1 after 2 hrs if HA persists; MDD: 200 mg    ondansetron (ZOFRAN) 4 MG Tab tablet Take 1 Tablet by mouth every four hours as needed for Nausea/Vomiting.    polyethylene glycol/lytes (MIRALAX) 17 g Pack Take 17 g by mouth every day.    Multiple Vitamins-Minerals (MULTIPLE VITAMINS/WOMENS PO) Take  by mouth.     MEDICAL, SOCIAL, AND FAMILY HISTORY:  There is no change in the patient's ROS or medical, social, or family histories since the previous visit on 7/3/2024.    REVIEW OF SYSTEMS:  A ROS was completed.  Pertinent positives and negatives were included in the HPI, above.  All other systems were reviewed and are negative.    PHYSICAL EXAM:  General/Medical:  - NAD    Neuro:  MENTAL STATUS: awake and alert; no deficits of speech or language; oriented to conversation; affect was appropriate to situation; pleasant, cooperative    CRANIAL NERVES:    II: acuity: NT, fields: NT, pupils: NT, discs: NT    III/IV/VI: versions: grossly intact    V: facial sensation: NT    VII: facial expression: symmetric    VIII: hearing: intact to voice    IX/X: palate: NT    XI: shoulder shrug: NT    XII: tongue: NT    MOTOR:  - bulk: NT  - tone: NT  Upper Extremity Strength (R/L)    NT   Elbow flexion NT   Elbow extension NT   Shoulder abduction NT     Lower Extremity Strength  (R/L)   Hip flexion NT   Knee extension NT   Knee flexion NT   Ankle dorsiflexion NT   Ankle plantarflexion NT     - pronator drift: NT  - abnormal movements: none    SENSATION:  - light touch: NT  - vibration (R/L, seconds): NT at the  great toes  - pinprick: NT  - proprioception: NT  - Romberg: NT    COORDINATION:  - finger to nose: NT  - finger tapping: NT    REFLEXES:  Reflex Right Left   BR NT NT   Biceps NT NT   Triceps NT NT   Patellae NT NT   Achilles NT NT   Toes NT NT     GAIT:  - NT    QUANTITATIVE SCORES:  Timed 25-foot walk (sec): NT today (4.4 on 3/20/2024).  Assistive device: none    REVIEW OF IMAGING STUDIES:  No additional data since the last visit.    REVIEW OF LABORATORY STUDIES:  No additional data since the last visit.    ASSESSMENT:  Rhea Baum is a 39 y.o. woman with MS and chronic migraine w/ aura.  She tolerated the first course of Mavenclad well.  Her migraines remain disruptive.  Botox was probably helpful, but this was prohibitively expensive.  She tried to access the Botox Savings Plan, but she was unable to obtain the correct documentation to provide to the company to qualify for the reimbursement.  Plans/recommendations as follows:    PLAN:  MS:  - plan to proceed with Mavenclad course 2 next year  - will obtain pre-treatment blood work prior to starting course 2    Chronic Migraine w/ Aura:  Orders Placed This Encounter    Atogepant (QULIPTA) 60 MG Tab    Ubrogepant 100 MG Tab    ondansetron (ZOFRAN) 4 MG Tab tablet     - keep a headache log    Follow-Up:  - Return in about 3 months (around 2/21/2025).    Signed: Olman Kerr M.D.

## 2025-08-19 ENCOUNTER — APPOINTMENT (OUTPATIENT)
Dept: RADIOLOGY | Facility: MEDICAL CENTER | Age: 40
End: 2025-08-19
Attending: PSYCHIATRY & NEUROLOGY
Payer: COMMERCIAL

## 2025-08-19 ENCOUNTER — PATIENT MESSAGE (OUTPATIENT)
Dept: NEUROLOGY | Facility: MEDICAL CENTER | Age: 40
End: 2025-08-19
Payer: COMMERCIAL